# Patient Record
Sex: FEMALE | Race: WHITE | NOT HISPANIC OR LATINO | Employment: OTHER | URBAN - METROPOLITAN AREA
[De-identification: names, ages, dates, MRNs, and addresses within clinical notes are randomized per-mention and may not be internally consistent; named-entity substitution may affect disease eponyms.]

---

## 2023-02-01 PROBLEM — R19.6 HALITOSIS: Status: ACTIVE | Noted: 2023-02-01

## 2023-02-01 PROBLEM — H53.9 CHANGES IN VISION: Status: ACTIVE | Noted: 2023-02-01

## 2023-02-01 PROBLEM — J01.00 ACUTE MAXILLARY SINUSITIS: Status: ACTIVE | Noted: 2023-02-01

## 2023-02-01 PROBLEM — M75.101 RIGHT ROTATOR CUFF TEAR: Status: ACTIVE | Noted: 2023-02-01

## 2023-02-01 PROBLEM — M79.18 MYOFASCIAL PAIN: Status: ACTIVE | Noted: 2023-02-01

## 2023-02-01 PROBLEM — R29.6 FREQUENT FALLS: Status: ACTIVE | Noted: 2023-02-01

## 2023-02-01 PROBLEM — H52.13 MYOPIA, BILATERAL: Status: ACTIVE | Noted: 2023-02-01

## 2023-02-01 PROBLEM — G47.00 INSOMNIA: Status: ACTIVE | Noted: 2023-02-01

## 2023-02-01 PROBLEM — M79.2 NEUROPATHIC PAIN: Status: ACTIVE | Noted: 2023-02-01

## 2023-02-01 PROBLEM — F43.10 PTSD (POST-TRAUMATIC STRESS DISORDER): Status: ACTIVE | Noted: 2023-02-01

## 2023-02-01 PROBLEM — M62.838 TRAPEZIUS MUSCLE SPASM: Status: ACTIVE | Noted: 2023-02-01

## 2023-02-01 PROBLEM — J34.89 NASAL SORE: Status: ACTIVE | Noted: 2023-02-01

## 2023-02-01 PROBLEM — M54.2 NECK PAIN: Status: ACTIVE | Noted: 2023-02-01

## 2023-02-01 PROBLEM — E78.1 HYPERTRIGLYCERIDEMIA: Status: ACTIVE | Noted: 2023-02-01

## 2023-02-01 PROBLEM — J30.9 ALLERGIC RHINITIS: Status: ACTIVE | Noted: 2023-02-01

## 2023-02-01 PROBLEM — K59.00 CONSTIPATION: Status: ACTIVE | Noted: 2023-02-01

## 2023-02-01 PROBLEM — E78.5 HYPERLIPEMIA: Status: ACTIVE | Noted: 2023-02-01

## 2023-02-01 PROBLEM — R41.3 MEMORY LOSS: Status: ACTIVE | Noted: 2023-02-01

## 2023-02-01 PROBLEM — N63.20 LEFT BREAST MASS: Status: ACTIVE | Noted: 2023-02-01

## 2023-02-01 PROBLEM — S76.819A STRAIN OF ILIOPSOAS MUSCLE: Status: ACTIVE | Noted: 2023-02-01

## 2023-02-01 PROBLEM — N39.41 URGE INCONTINENCE OF URINE: Status: ACTIVE | Noted: 2023-02-01

## 2023-02-01 PROBLEM — Z96.611 STATUS POST REPLACEMENT OF RIGHT SHOULDER JOINT: Status: ACTIVE | Noted: 2023-02-01

## 2023-02-01 PROBLEM — R10.32 LEFT INGUINAL PAIN: Status: ACTIVE | Noted: 2023-02-01

## 2023-02-01 PROBLEM — M75.21 BICEPS TENDINITIS OF RIGHT UPPER EXTREMITY: Status: ACTIVE | Noted: 2023-02-01

## 2023-02-01 PROBLEM — M17.0 OSTEOARTHRITIS OF BOTH KNEES: Status: ACTIVE | Noted: 2023-02-01

## 2023-02-01 PROBLEM — R05.3 CHRONIC COUGH: Status: ACTIVE | Noted: 2023-02-01

## 2023-02-01 PROBLEM — M54.50 LOW BACK PAIN: Status: ACTIVE | Noted: 2023-02-01

## 2023-02-01 PROBLEM — R05.3 PERSISTENT COUGH: Status: ACTIVE | Noted: 2023-02-01

## 2023-02-01 PROBLEM — M25.552 HIP PAIN, LEFT: Status: ACTIVE | Noted: 2023-02-01

## 2023-02-01 PROBLEM — N39.3 STRESS INCONTINENCE, FEMALE: Status: ACTIVE | Noted: 2023-02-01

## 2023-02-01 PROBLEM — K21.9 GERD (GASTROESOPHAGEAL REFLUX DISEASE): Status: ACTIVE | Noted: 2023-02-01

## 2023-02-01 PROBLEM — H52.4 BILATERAL PRESBYOPIA: Status: ACTIVE | Noted: 2023-02-01

## 2023-02-01 PROBLEM — Z96.619 S/P SHOULDER REPLACEMENT: Status: ACTIVE | Noted: 2023-02-01

## 2023-02-01 PROBLEM — F98.8 ATTENTION DEFICIT DISORDER (ADD): Status: ACTIVE | Noted: 2023-02-01

## 2023-02-01 PROBLEM — M19.049 ARTHRITIS OF CARPOMETACARPAL JOINT: Status: ACTIVE | Noted: 2023-02-01

## 2023-02-01 PROBLEM — M25.511 RIGHT SHOULDER PAIN: Status: ACTIVE | Noted: 2023-02-01

## 2023-02-01 PROBLEM — S06.9XAA TBI (TRAUMATIC BRAIN INJURY) (MULTI): Status: ACTIVE | Noted: 2023-02-01

## 2023-02-01 PROBLEM — R92.1 CALCIFICATION OF BREAST: Status: ACTIVE | Noted: 2023-02-01

## 2023-02-01 PROBLEM — M79.645 PAIN OF LEFT THUMB: Status: ACTIVE | Noted: 2023-02-01

## 2023-02-01 PROBLEM — T84.50XA PROSTHETIC JOINT INFECTION (CMS-HCC): Status: ACTIVE | Noted: 2023-02-01

## 2023-02-01 PROBLEM — R00.2 PALPITATIONS: Status: ACTIVE | Noted: 2023-02-01

## 2023-02-01 PROBLEM — R63.4 WEIGHT LOSS: Status: ACTIVE | Noted: 2023-02-01

## 2023-02-01 PROBLEM — F32.A ANXIETY AND DEPRESSION: Status: ACTIVE | Noted: 2023-02-01

## 2023-02-01 PROBLEM — I10 HYPERTENSION: Status: ACTIVE | Noted: 2023-02-01

## 2023-02-01 PROBLEM — R73.01 ELEVATED FASTING GLUCOSE: Status: ACTIVE | Noted: 2023-02-01

## 2023-02-01 PROBLEM — M16.10 HIP ARTHRITIS: Status: ACTIVE | Noted: 2023-02-01

## 2023-02-01 PROBLEM — J40: Status: ACTIVE | Noted: 2023-02-01

## 2023-02-01 PROBLEM — Z98.890 S/P ARTHROSCOPY OF RIGHT SHOULDER: Status: ACTIVE | Noted: 2023-02-01

## 2023-02-01 PROBLEM — M53.3 SACROILIAC JOINT DYSFUNCTION OF BOTH SIDES: Status: ACTIVE | Noted: 2023-02-01

## 2023-02-01 PROBLEM — M53.3 SACROILIAC JOINT PAIN: Status: ACTIVE | Noted: 2023-02-01

## 2023-02-01 PROBLEM — F41.9 ANXIETY AND DEPRESSION: Status: ACTIVE | Noted: 2023-02-01

## 2023-02-01 PROBLEM — M18.12 PRIMARY OSTEOARTHRITIS OF FIRST CARPOMETACARPAL JOINT OF LEFT HAND: Status: ACTIVE | Noted: 2023-02-01

## 2023-02-01 PROBLEM — H25.13 CATARACT, NUCLEAR SCLEROTIC, BOTH EYES: Status: ACTIVE | Noted: 2023-02-01

## 2023-02-01 PROBLEM — S91.119A LACERATION OF TOE, RIGHT: Status: ACTIVE | Noted: 2023-02-01

## 2023-02-01 PROBLEM — J20.9 ACUTE BRONCHITIS WITH BRONCHOSPASM: Status: ACTIVE | Noted: 2023-02-01

## 2023-02-01 PROBLEM — R10.30 GROIN PAIN: Status: ACTIVE | Noted: 2023-02-01

## 2023-02-01 PROBLEM — M54.31 BACK PAIN WITH RIGHT-SIDED SCIATICA: Status: ACTIVE | Noted: 2023-02-01

## 2023-02-01 PROBLEM — R26.2 DIFFICULTY WALKING: Status: ACTIVE | Noted: 2023-02-01

## 2023-02-01 PROBLEM — L01.00 IMPETIGO: Status: ACTIVE | Noted: 2023-02-01

## 2023-02-01 RX ORDER — IBUPROFEN 800 MG/1
800 TABLET ORAL EVERY 8 HOURS PRN
COMMUNITY
End: 2024-03-27 | Stop reason: WASHOUT

## 2023-02-01 RX ORDER — BUPROPION HYDROCHLORIDE 450 MG/1
1 TABLET, FILM COATED, EXTENDED RELEASE ORAL DAILY
COMMUNITY
Start: 2021-01-21 | End: 2024-02-22 | Stop reason: SDUPTHER

## 2023-02-01 RX ORDER — AMITRIPTYLINE HYDROCHLORIDE 10 MG/1
10 TABLET, FILM COATED ORAL
COMMUNITY
Start: 2022-05-04 | End: 2024-02-22 | Stop reason: ALTCHOICE

## 2023-02-01 RX ORDER — TOPIRAMATE 100 MG/1
100 TABLET, FILM COATED ORAL NIGHTLY
COMMUNITY
Start: 2020-06-24 | End: 2024-02-22 | Stop reason: SDUPTHER

## 2023-02-01 RX ORDER — FOLIC ACID 1 MG/1
1 TABLET ORAL 2 TIMES DAILY
COMMUNITY
Start: 2021-01-21

## 2023-02-01 RX ORDER — NAPROXEN SODIUM 220 MG/1
1 TABLET ORAL DAILY
COMMUNITY
Start: 2021-01-21 | End: 2024-02-22 | Stop reason: SDUPTHER

## 2023-02-01 RX ORDER — MULTIVITAMIN
1 TABLET ORAL DAILY
COMMUNITY
Start: 2022-05-04 | End: 2024-02-22 | Stop reason: SDUPTHER

## 2023-02-01 RX ORDER — MEMANTINE HYDROCHLORIDE 10 MG/1
10 TABLET ORAL 2 TIMES DAILY
COMMUNITY
Start: 2021-01-21 | End: 2024-02-22 | Stop reason: SDUPTHER

## 2023-02-01 RX ORDER — LIDOCAINE 50 MG/G
OINTMENT TOPICAL 3 TIMES DAILY
COMMUNITY
Start: 2021-01-22 | End: 2024-02-22 | Stop reason: SDUPTHER

## 2023-02-01 RX ORDER — ATORVASTATIN CALCIUM 40 MG/1
1 TABLET, FILM COATED ORAL DAILY
COMMUNITY
Start: 2020-06-24 | End: 2024-02-22 | Stop reason: ALTCHOICE

## 2023-02-01 RX ORDER — METHYLPREDNISOLONE SODIUM SUCCINATE 40 MG/ML
INJECTION INTRAMUSCULAR; INTRAVENOUS
COMMUNITY
Start: 2021-09-08 | End: 2024-02-22 | Stop reason: ALTCHOICE

## 2023-02-01 RX ORDER — DULOXETIN HYDROCHLORIDE 60 MG/1
60 CAPSULE, DELAYED RELEASE ORAL DAILY
COMMUNITY
End: 2024-02-22 | Stop reason: SDUPTHER

## 2023-02-01 RX ORDER — METOPROLOL TARTRATE 25 MG/1
0.5 TABLET, FILM COATED ORAL 2 TIMES DAILY
COMMUNITY
Start: 2022-08-08 | End: 2024-02-22 | Stop reason: SDUPTHER

## 2023-02-01 RX ORDER — GABAPENTIN 100 MG/1
100 CAPSULE ORAL
COMMUNITY
End: 2024-02-22 | Stop reason: SDUPTHER

## 2023-03-27 ENCOUNTER — TELEPHONE (OUTPATIENT)
Dept: PRIMARY CARE | Facility: CLINIC | Age: 68
End: 2023-03-27

## 2023-09-08 ENCOUNTER — APPOINTMENT (OUTPATIENT)
Dept: PRIMARY CARE | Facility: CLINIC | Age: 68
End: 2023-09-08

## 2024-01-29 PROBLEM — S49.91XA INJURY OF RIGHT SHOULDER: Status: ACTIVE | Noted: 2022-06-27

## 2024-01-29 PROBLEM — Z96.611 S/P REVERSE TOTAL SHOULDER ARTHROPLASTY, RIGHT: Status: ACTIVE | Noted: 2022-09-06

## 2024-01-29 PROBLEM — S46.811A FULL THICKNESS TEAR OF RIGHT SUBSCAPULARIS TENDON: Status: ACTIVE | Noted: 2022-04-18

## 2024-01-29 PROBLEM — R92.8 ABNORMAL MAMMOGRAM: Status: ACTIVE | Noted: 2017-10-01

## 2024-01-29 PROBLEM — M25.611 DECREASED RANGE OF MOTION OF RIGHT SHOULDER: Status: ACTIVE | Noted: 2022-12-10

## 2024-01-29 PROBLEM — Z77.120 EXPOSURE TO MOLD: Status: ACTIVE | Noted: 2024-01-29
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
## 2024-01-29 PROBLEM — S16.1XXA CERVICAL MUSCLE STRAIN: Status: ACTIVE | Noted: 2024-01-29

## 2024-01-29 PROBLEM — S82.832D CLOSED FRACTURE OF DISTAL END OF LEFT FIBULA WITH ROUTINE HEALING: Status: ACTIVE | Noted: 2024-01-29

## 2024-01-29 PROBLEM — S61.419A LACERATION OF HAND: Status: ACTIVE | Noted: 2024-01-29

## 2024-01-29 PROBLEM — E66.9 OBESE: Status: ACTIVE | Noted: 2019-08-15

## 2024-01-29 PROBLEM — M19.90 OSTEOARTHRITIS: Status: ACTIVE | Noted: 2022-06-14

## 2024-01-29 PROBLEM — S43.409A SPRAIN OF SHOULDER: Status: ACTIVE | Noted: 2024-01-29

## 2024-01-29 PROBLEM — R06.00 DYSPNEA: Status: ACTIVE | Noted: 2024-01-29

## 2024-02-22 ENCOUNTER — OFFICE VISIT (OUTPATIENT)
Dept: PRIMARY CARE | Facility: CLINIC | Age: 69
End: 2024-02-22
Payer: MEDICAID

## 2024-02-22 VITALS
HEART RATE: 87 BPM | DIASTOLIC BLOOD PRESSURE: 62 MMHG | TEMPERATURE: 97.3 F | RESPIRATION RATE: 18 BRPM | HEIGHT: 63 IN | BODY MASS INDEX: 30.33 KG/M2 | SYSTOLIC BLOOD PRESSURE: 122 MMHG | OXYGEN SATURATION: 95 % | WEIGHT: 171.2 LBS

## 2024-02-22 DIAGNOSIS — S06.9XAS TRAUMATIC BRAIN INJURY, WITH UNKNOWN LOSS OF CONSCIOUSNESS STATUS, SEQUELA (CMS-HCC): Primary | ICD-10-CM

## 2024-02-22 DIAGNOSIS — E56.9 VITAMIN DEFICIENCY: ICD-10-CM

## 2024-02-22 DIAGNOSIS — F43.10 PTSD (POST-TRAUMATIC STRESS DISORDER): ICD-10-CM

## 2024-02-22 DIAGNOSIS — F98.8 ATTENTION DEFICIT DISORDER, UNSPECIFIED HYPERACTIVITY PRESENCE: ICD-10-CM

## 2024-02-22 DIAGNOSIS — G89.29 CHRONIC LEFT SHOULDER PAIN: ICD-10-CM

## 2024-02-22 DIAGNOSIS — M54.31 BACK PAIN WITH RIGHT-SIDED SCIATICA: ICD-10-CM

## 2024-02-22 DIAGNOSIS — M75.21 BICEPS TENDINITIS OF RIGHT UPPER EXTREMITY: ICD-10-CM

## 2024-02-22 DIAGNOSIS — G89.29 CHRONIC NECK PAIN: ICD-10-CM

## 2024-02-22 DIAGNOSIS — R41.3 MEMORY LOSS: ICD-10-CM

## 2024-02-22 DIAGNOSIS — R10.32 GROIN PAIN, LEFT: ICD-10-CM

## 2024-02-22 DIAGNOSIS — I10 PRIMARY HYPERTENSION: ICD-10-CM

## 2024-02-22 DIAGNOSIS — M54.2 CHRONIC NECK PAIN: ICD-10-CM

## 2024-02-22 DIAGNOSIS — M25.512 CHRONIC LEFT SHOULDER PAIN: ICD-10-CM

## 2024-02-22 DIAGNOSIS — F41.9 ANXIETY AND DEPRESSION: ICD-10-CM

## 2024-02-22 DIAGNOSIS — Z12.11 SCREENING FOR COLON CANCER: ICD-10-CM

## 2024-02-22 DIAGNOSIS — F32.A ANXIETY AND DEPRESSION: ICD-10-CM

## 2024-02-22 DIAGNOSIS — K59.01 SLOW TRANSIT CONSTIPATION: ICD-10-CM

## 2024-02-22 PROCEDURE — 3078F DIAST BP <80 MM HG: CPT | Performed by: NURSE PRACTITIONER

## 2024-02-22 PROCEDURE — 3074F SYST BP LT 130 MM HG: CPT | Performed by: NURSE PRACTITIONER

## 2024-02-22 PROCEDURE — 1036F TOBACCO NON-USER: CPT | Performed by: NURSE PRACTITIONER

## 2024-02-22 PROCEDURE — 1159F MED LIST DOCD IN RCRD: CPT | Performed by: NURSE PRACTITIONER

## 2024-02-22 PROCEDURE — 1126F AMNT PAIN NOTED NONE PRSNT: CPT | Performed by: NURSE PRACTITIONER

## 2024-02-22 PROCEDURE — 99215 OFFICE O/P EST HI 40 MIN: CPT | Performed by: NURSE PRACTITIONER

## 2024-02-22 RX ORDER — METOPROLOL TARTRATE 25 MG/1
12.5 TABLET, FILM COATED ORAL 2 TIMES DAILY
Qty: 90 TABLET | Refills: 1 | Status: SHIPPED | OUTPATIENT
Start: 2024-02-22 | End: 2024-06-07 | Stop reason: SDUPTHER

## 2024-02-22 RX ORDER — TOPIRAMATE 100 MG/1
100 TABLET, FILM COATED ORAL NIGHTLY
Qty: 90 TABLET | Refills: 1 | Status: SHIPPED | OUTPATIENT
Start: 2024-02-22

## 2024-02-22 RX ORDER — BUPROPION HYDROCHLORIDE 450 MG/1
450 TABLET, FILM COATED, EXTENDED RELEASE ORAL DAILY
Qty: 90 TABLET | Refills: 1 | Status: SHIPPED | OUTPATIENT
Start: 2024-02-22 | End: 2024-04-25 | Stop reason: SDUPTHER

## 2024-02-22 RX ORDER — MULTIVITAMIN
1 TABLET ORAL DAILY
Qty: 90 TABLET | Refills: 1 | Status: SHIPPED | OUTPATIENT
Start: 2024-02-22 | End: 2024-06-07 | Stop reason: SDUPTHER

## 2024-02-22 RX ORDER — GABAPENTIN 100 MG/1
CAPSULE ORAL
Qty: 120 CAPSULE | Refills: 1 | Status: SHIPPED | OUTPATIENT
Start: 2024-02-22

## 2024-02-22 RX ORDER — LIDOCAINE 50 MG/G
OINTMENT TOPICAL 3 TIMES DAILY
Qty: 240 G | Refills: 1 | Status: SHIPPED | OUTPATIENT
Start: 2024-02-22 | End: 2024-04-25 | Stop reason: SDUPTHER

## 2024-02-22 RX ORDER — DULOXETIN HYDROCHLORIDE 60 MG/1
60 CAPSULE, DELAYED RELEASE ORAL DAILY
Qty: 90 CAPSULE | Refills: 0 | Status: SHIPPED | OUTPATIENT
Start: 2024-02-22 | End: 2024-06-07 | Stop reason: SDUPTHER

## 2024-02-22 RX ORDER — MEMANTINE HYDROCHLORIDE 10 MG/1
10 TABLET ORAL 2 TIMES DAILY
Qty: 90 TABLET | Refills: 1 | Status: SHIPPED | OUTPATIENT
Start: 2024-02-22 | End: 2024-06-07 | Stop reason: SDUPTHER

## 2024-02-22 ASSESSMENT — PATIENT HEALTH QUESTIONNAIRE - PHQ9
1. LITTLE INTEREST OR PLEASURE IN DOING THINGS: NOT AT ALL
2. FEELING DOWN, DEPRESSED OR HOPELESS: NOT AT ALL
SUM OF ALL RESPONSES TO PHQ9 QUESTIONS 1 & 2: 0

## 2024-02-22 ASSESSMENT — LIFESTYLE VARIABLES
HOW MANY STANDARD DRINKS CONTAINING ALCOHOL DO YOU HAVE ON A TYPICAL DAY: 1 OR 2
AUDIT-C TOTAL SCORE: 1
HOW OFTEN DO YOU HAVE SIX OR MORE DRINKS ON ONE OCCASION: NEVER
SKIP TO QUESTIONS 9-10: 1
HOW OFTEN DO YOU HAVE A DRINK CONTAINING ALCOHOL: MONTHLY OR LESS
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
## 2024-02-22 NOTE — PATIENT INSTRUCTIONS
Install the [facility] my chart to your phone so you can see what was done   Install the [facility] my chart   Get a    Have your room mate get a sleep study for sleep apnea   You look really good right   There is jeovanny that is a network of information and support for people with add and adhd   Follow up with prakash porras and also a psychiatric social worker to talk with   Follow up 1 month   Go to the library for their resources   It takes time to get back on your feet   In [place] people get ketamine infusions for pain  there is a comprehensive pain center in [place]

## 2024-03-08 DIAGNOSIS — M54.31 BACK PAIN WITH RIGHT-SIDED SCIATICA: ICD-10-CM

## 2024-03-08 RX ORDER — LIDOCAINE, MENTHOL 4; 1 G/100G; G/100G
1 PATCH TOPICAL AS NEEDED
Qty: 30 PATCH | Refills: 3 | Status: SHIPPED | OUTPATIENT
Start: 2024-03-08

## 2024-03-08 RX ORDER — LIDOCAINE, MENTHOL 4; 1 G/100G; G/100G
PATCH TOPICAL AS NEEDED
COMMUNITY
End: 2024-03-08 | Stop reason: SDUPTHER

## 2024-03-08 NOTE — TELEPHONE ENCOUNTER
Patient contacted the office to request a medication change. She would like to take the topiramate 100 mg twice a day; one in the morning and one in the evening. Patient needs a prior authorization on said medication.

## 2024-03-27 ENCOUNTER — OFFICE VISIT (OUTPATIENT)
Dept: PRIMARY CARE | Facility: CLINIC | Age: 69
End: 2024-03-27
Payer: COMMERCIAL

## 2024-03-27 VITALS
DIASTOLIC BLOOD PRESSURE: 80 MMHG | WEIGHT: 176 LBS | BODY MASS INDEX: 31.18 KG/M2 | TEMPERATURE: 96.9 F | HEART RATE: 70 BPM | RESPIRATION RATE: 12 BRPM | SYSTOLIC BLOOD PRESSURE: 124 MMHG | OXYGEN SATURATION: 95 % | HEIGHT: 63 IN

## 2024-03-27 DIAGNOSIS — G89.29 CHRONIC BILATERAL LOW BACK PAIN WITH BILATERAL SCIATICA: ICD-10-CM

## 2024-03-27 DIAGNOSIS — H53.9 VISUAL CHANGES: ICD-10-CM

## 2024-03-27 DIAGNOSIS — E78.2 MIXED HYPERLIPIDEMIA: ICD-10-CM

## 2024-03-27 DIAGNOSIS — M54.42 CHRONIC BILATERAL LOW BACK PAIN WITH BILATERAL SCIATICA: ICD-10-CM

## 2024-03-27 DIAGNOSIS — K59.01 SLOW TRANSIT CONSTIPATION: ICD-10-CM

## 2024-03-27 DIAGNOSIS — Z87.828 HX OF HEAD INJURY: ICD-10-CM

## 2024-03-27 DIAGNOSIS — R41.3 MEMORY LOSS: ICD-10-CM

## 2024-03-27 DIAGNOSIS — F43.10 PTSD (POST-TRAUMATIC STRESS DISORDER): Primary | ICD-10-CM

## 2024-03-27 DIAGNOSIS — F33.1 MODERATE EPISODE OF RECURRENT MAJOR DEPRESSIVE DISORDER (MULTI): ICD-10-CM

## 2024-03-27 DIAGNOSIS — M54.41 CHRONIC BILATERAL LOW BACK PAIN WITH BILATERAL SCIATICA: ICD-10-CM

## 2024-03-27 PROCEDURE — 99214 OFFICE O/P EST MOD 30 MIN: CPT | Performed by: NURSE PRACTITIONER

## 2024-03-27 PROCEDURE — 3079F DIAST BP 80-89 MM HG: CPT | Performed by: NURSE PRACTITIONER

## 2024-03-27 PROCEDURE — 1159F MED LIST DOCD IN RCRD: CPT | Performed by: NURSE PRACTITIONER

## 2024-03-27 PROCEDURE — 1160F RVW MEDS BY RX/DR IN RCRD: CPT | Performed by: NURSE PRACTITIONER

## 2024-03-27 PROCEDURE — 3074F SYST BP LT 130 MM HG: CPT | Performed by: NURSE PRACTITIONER

## 2024-03-27 RX ORDER — DOCUSATE SODIUM 100 MG/1
CAPSULE, LIQUID FILLED ORAL
Qty: 90 CAPSULE | Refills: 11 | Status: SHIPPED | OUTPATIENT
Start: 2024-03-27

## 2024-03-27 RX ORDER — ATORVASTATIN CALCIUM 40 MG/1
40 TABLET, FILM COATED ORAL DAILY
Qty: 100 TABLET | Refills: 3 | Status: SHIPPED | OUTPATIENT
Start: 2024-03-27 | End: 2025-05-01

## 2024-03-27 ASSESSMENT — PATIENT HEALTH QUESTIONNAIRE - PHQ9
2. FEELING DOWN, DEPRESSED OR HOPELESS: NOT AT ALL
SUM OF ALL RESPONSES TO PHQ9 QUESTIONS 1 AND 2: 0
1. LITTLE INTEREST OR PLEASURE IN DOING THINGS: NOT AT ALL
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
## 2024-03-27 NOTE — PATIENT INSTRUCTIONS
Get a  with your insurance   Call for an appointment  her name is marlyn bowden   Download the  mychart on theplaystore   Try to have some beans like green lentils and eat bran cereal in the am and use stool softeners   Follow up in a month

## 2024-04-02 ENCOUNTER — APPOINTMENT (OUTPATIENT)
Dept: NEUROSURGERY | Facility: CLINIC | Age: 69
End: 2024-04-02
Payer: COMMERCIAL

## 2024-04-08 PROBLEM — F03.93 DEMENTIA WITH MOOD DISTURBANCE, UNSPECIFIED DEMENTIA SEVERITY, UNSPECIFIED DEMENTIA TYPE (MULTI): Status: ACTIVE | Noted: 2024-04-08

## 2024-04-23 ENCOUNTER — TELEMEDICINE (OUTPATIENT)
Dept: PHARMACY | Facility: HOSPITAL | Age: 69
End: 2024-04-23

## 2024-04-23 DIAGNOSIS — F43.10 PTSD (POST-TRAUMATIC STRESS DISORDER): ICD-10-CM

## 2024-04-23 NOTE — PROGRESS NOTES
"Subjective   Patient ID: Johanne Acosta is a 68 y.o. female who presents for Med Management.    Referring Provider: Aislinn Ambrosio APRN*    Patient has had some difficulty getting Topamax, Lidocain 5% Patches, and is in need of a PA for Forfivo 450 mg 24 hr tablets. States she has tried generic bupropion and it \"did nothing\" and that she was told by a previous  provider that this is common.     Objective     There were no vitals taken for this visit.     LAB  Lab Results   Component Value Date    BILITOT 0.4 04/20/2021    CALCIUM 9.7 04/20/2021    CO2 26 04/20/2021     04/20/2021    CREATININE 0.84 04/20/2021    GLUCOSE 103 (H) 04/20/2021    ALKPHOS 102 04/20/2021    K 3.8 04/20/2021    PROT 6.6 04/20/2021     04/20/2021    AST 15 04/20/2021    ALT 14 04/20/2021    BUN 15 04/20/2021    ANIONGAP 16 04/20/2021    PHOS 2.6 01/13/2021    ALBUMIN 4.3 04/20/2021     Lab Results   Component Value Date    TRIG 238 (H) 09/22/2020    CHOL 162 09/22/2020    HDL 60.0 09/22/2020     No results found for: \"HGBA1C\"  The ASCVD Risk score (Stephanie DK, et al., 2019) failed to calculate for the following reasons:    Cannot find a previous HDL lab    Cannot find a previous total cholesterol lab    Assessment/Plan   Problem List Items Addressed This Visit       PTSD (post-traumatic stress disorder)     Patient informed that Lidocaine 5% patches are on national backorder and that Lidocaine 5% ointment was sent to her preferred pharmacy.     Confirmed patient has previously been on topiramate and denies any impact on cognitive functioning. Called [facility] in [place]- Rx was transferred to [facility]- topiramate needs a PA as well    PA for Forfivo XL APPROVED: Key: [id number]    PA for Topiramate SUBMITTED: Key: [id number]     Follow-up: not necessary at this time    Martha LancasetrD [facility]  Clinical Pharmacist Specialist, Primary Care    Continue all meds under the continuation of care with the " referring provider and clinical pharmacy team

## 2024-04-25 ENCOUNTER — OFFICE VISIT (OUTPATIENT)
Dept: PRIMARY CARE | Facility: CLINIC | Age: 69
End: 2024-04-25
Payer: COMMERCIAL

## 2024-04-25 VITALS
HEART RATE: 98 BPM | OXYGEN SATURATION: 95 % | BODY MASS INDEX: 29.77 KG/M2 | TEMPERATURE: 98.4 F | RESPIRATION RATE: 12 BRPM | SYSTOLIC BLOOD PRESSURE: 132 MMHG | HEIGHT: 65 IN | WEIGHT: 178.7 LBS | DIASTOLIC BLOOD PRESSURE: 80 MMHG

## 2024-04-25 DIAGNOSIS — M25.521 RIGHT ELBOW PAIN: ICD-10-CM

## 2024-04-25 DIAGNOSIS — G89.29 CHRONIC PAIN OF RIGHT KNEE: ICD-10-CM

## 2024-04-25 DIAGNOSIS — F98.8 ATTENTION DEFICIT DISORDER (ADD) WITHOUT HYPERACTIVITY: ICD-10-CM

## 2024-04-25 DIAGNOSIS — G89.29 CHRONIC BILATERAL LOW BACK PAIN WITH BILATERAL SCIATICA: ICD-10-CM

## 2024-04-25 DIAGNOSIS — F32.A ANXIETY AND DEPRESSION: ICD-10-CM

## 2024-04-25 DIAGNOSIS — E56.9 VITAMIN DEFICIENCY: ICD-10-CM

## 2024-04-25 DIAGNOSIS — R09.81 SINUS CONGESTION: ICD-10-CM

## 2024-04-25 DIAGNOSIS — M54.42 CHRONIC BILATERAL LOW BACK PAIN WITH BILATERAL SCIATICA: ICD-10-CM

## 2024-04-25 DIAGNOSIS — M25.561 CHRONIC PAIN OF RIGHT KNEE: ICD-10-CM

## 2024-04-25 DIAGNOSIS — Z59.819 HOUSING INSECURITY: ICD-10-CM

## 2024-04-25 DIAGNOSIS — M48.02 SPINAL STENOSIS OF CERVICAL REGION: ICD-10-CM

## 2024-04-25 DIAGNOSIS — Z59.41 FOOD INSECURITY: ICD-10-CM

## 2024-04-25 DIAGNOSIS — M54.31 BACK PAIN WITH RIGHT-SIDED SCIATICA: ICD-10-CM

## 2024-04-25 DIAGNOSIS — I10 PRIMARY HYPERTENSION: Primary | ICD-10-CM

## 2024-04-25 DIAGNOSIS — Z59.86 FINANCIAL INSECURITY: ICD-10-CM

## 2024-04-25 DIAGNOSIS — F41.9 ANXIETY AND DEPRESSION: ICD-10-CM

## 2024-04-25 DIAGNOSIS — M54.41 CHRONIC BILATERAL LOW BACK PAIN WITH BILATERAL SCIATICA: ICD-10-CM

## 2024-04-25 PROCEDURE — 1160F RVW MEDS BY RX/DR IN RCRD: CPT | Performed by: NURSE PRACTITIONER

## 2024-04-25 PROCEDURE — 1159F MED LIST DOCD IN RCRD: CPT | Performed by: NURSE PRACTITIONER

## 2024-04-25 PROCEDURE — 3079F DIAST BP 80-89 MM HG: CPT | Performed by: NURSE PRACTITIONER

## 2024-04-25 PROCEDURE — 99215 OFFICE O/P EST HI 40 MIN: CPT | Performed by: NURSE PRACTITIONER

## 2024-04-25 PROCEDURE — 3075F SYST BP GE 130 - 139MM HG: CPT | Performed by: NURSE PRACTITIONER

## 2024-04-25 RX ORDER — LORATADINE 10 MG/1
TABLET ORAL
Qty: 30 TABLET | Refills: 2 | Status: SHIPPED | OUTPATIENT
Start: 2024-04-25

## 2024-04-25 RX ORDER — LIDOCAINE 50 MG/G
OINTMENT TOPICAL 3 TIMES DAILY
Qty: 240 G | Refills: 1 | Status: SHIPPED | OUTPATIENT
Start: 2024-04-25

## 2024-04-25 RX ORDER — FLUTICASONE PROPIONATE 50 MCG
1 SPRAY, SUSPENSION (ML) NASAL DAILY
Qty: 16 G | Refills: 5 | Status: SHIPPED | OUTPATIENT
Start: 2024-04-25 | End: 2025-04-25

## 2024-04-25 RX ORDER — FOLIC ACID 1 MG/1
1 TABLET ORAL 2 TIMES DAILY
Status: CANCELLED | OUTPATIENT
Start: 2024-04-25

## 2024-04-25 RX ORDER — BUPROPION HYDROCHLORIDE 450 MG/1
TABLET, FILM COATED, EXTENDED RELEASE ORAL
Qty: 90 TABLET | Refills: 1 | Status: SHIPPED | OUTPATIENT
Start: 2024-04-25 | End: 2024-06-07 | Stop reason: SDUPTHER

## 2024-04-25 RX ORDER — FOLIC ACID 1 MG/1
1 TABLET ORAL DAILY
Qty: 90 TABLET | Refills: 1 | Status: SHIPPED | OUTPATIENT
Start: 2024-04-25 | End: 2025-04-25

## 2024-04-25 RX ORDER — LIDOCAINE 50 MG/G
PATCH TOPICAL
Qty: 30 PATCH | Refills: 3 | Status: SHIPPED | OUTPATIENT
Start: 2024-04-25

## 2024-04-25 RX ORDER — GUAIFENESIN 600 MG/1
1200 TABLET, EXTENDED RELEASE ORAL 2 TIMES DAILY
Qty: 120 TABLET | Refills: 11 | Status: SHIPPED | OUTPATIENT
Start: 2024-04-25 | End: 2025-04-25

## 2024-04-25 SDOH — ECONOMIC STABILITY - FOOD INSECURITY: FOOD INSECURITY: Z59.41

## 2024-04-25 SDOH — ECONOMIC STABILITY - HOUSING INSECURITY: HOUSING INSTABILITY UNSPECIFIED: Z59.819

## 2024-04-25 SDOH — ECONOMIC STABILITY - INCOME SECURITY: FINANCIAL INSECURITY: Z59.86

## 2024-04-25 ASSESSMENT — PATIENT HEALTH QUESTIONNAIRE - PHQ9
1. LITTLE INTEREST OR PLEASURE IN DOING THINGS: NOT AT ALL
SUM OF ALL RESPONSES TO PHQ9 QUESTIONS 1 AND 2: 0
2. FEELING DOWN, DEPRESSED OR HOPELESS: NOT AT ALL

## 2024-04-25 NOTE — PROGRESS NOTES
"Subjective   Patient ID: Johanne Acosta is a 68 y.o. female who presents for Follow-up (Follow up- meds discussion).    HPI the patient with anxiety and depression add , back pain , cataracs , hip arthritis , htn , hyperlipidemia , s/p shoulder replacement , stress incontinence, traumatic brain injury  , hypothyroidism, cognitive decline joins me for a follow up visit   She is living in a shelter in [place] , she is not able to have enough food and has problems paying bills . Her car that was given to her needs repairs   I discussed having her talk to our community  to help her get some resources   I advised her to get a  through her insurance   She has applied for section 8 last year , she left the area and went to [place] and has come back   She is having sinus congestion she has pressure over the left maxillary region   She does not want to go to the ortho office she could not have a parking space   She has tingling of the hands from her injury in the past she has chronic back pain knee pain and elbow pain\ and spinal stenosis of the cervical spine   I advised her to go to the comprehensive pain center   She is likely deconditioned and has djd in her joints   She needs pa for the medications that help her , lidocaine patches , Forfivo 450 and topamax   I spent 61 minutes with the patient face to face and 10 minutes with documentation     Review of Systems. Negative except what was mentioned in the HPI       Objective   /80 (Patient Position: Sitting)   Pulse 98   Temp 36.9 °C (98.4 °F)   Resp 12   Ht 1.651 m (5' 5\")   Wt 81.1 kg (178 lb 11.2 oz)   SpO2 95%   BMI 29.74 kg/m²     Physical Exam  Vitals and nursing note reviewed.   Constitutional:       Appearance: Normal appearance.   HENT:      Head: Normocephalic and atraumatic.      Right Ear: Tympanic membrane normal.      Left Ear: Tympanic membrane normal.      Nose: Nose normal.      Mouth/Throat:      Mouth: Mucous " membranes are moist.   Eyes:      Extraocular Movements: Extraocular movements intact.      Conjunctiva/sclera: Conjunctivae normal.   Cardiovascular:      Rate and Rhythm: Normal rate and regular rhythm.      Pulses: Normal pulses.      Heart sounds: Normal heart sounds.   Pulmonary:      Effort: Pulmonary effort is normal.      Breath sounds: Normal breath sounds.   Abdominal:      General: Abdomen is flat. Bowel sounds are normal.      Palpations: Abdomen is soft.   Musculoskeletal:         General: Normal range of motion.      Cervical back: Normal range of motion and neck supple.      Comments: Tenderness over the right elbow   Pain with spinal rom   Knee pain with rom    Skin:     General: Skin is warm and dry.   Neurological:      General: No focal deficit present.      Mental Status: She is alert and oriented to person, place, and time. Mental status is at baseline.   Psychiatric:         Mood and Affect: Mood normal.         Behavior: Behavior normal.         Thought Content: Thought content normal.         Judgment: Judgment normal.         Assessment/Plan   Problem List Items Addressed This Visit             ICD-10-CM    Anxiety and depression F41.9, F32.A    Relevant Medications    buPROPion XL (Forfivo XL) 450 mg 24 hr tablet    Other Relevant Orders    Referral to Psychiatry    Attention deficit disorder (ADD) F98.8    Relevant Orders    Referral to Psychiatry    Back pain with right-sided sciatica M54.31    Relevant Medications    lidocaine (Xylocaine) 5 % ointment    lidocaine (Lidoderm) 5 % patch    Other Relevant Orders    Referral to Physical Therapy    Hypertension - Primary I10    Low back pain M54.50    Relevant Medications    lidocaine (Lidoderm) 5 % patch    Other Relevant Orders    Disability Placard    XR lumbar spine 2-3 views    [facility] to [facility]    Referral to Physical Therapy     Other Visit Diagnoses         Codes    Housing insecurity     Z59.819    Relevant Orders     Referral to Community Health Worker    Food insecurity     Z59.41    Relevant Orders    Referral to Community Health Worker    Financial insecurity     Z59.86    Relevant Orders    Referral to Community Health Worker    Vitamin deficiency     E56.9    Relevant Medications    folic acid (Folvite) 1 mg tablet    Sinus congestion     R09.81    Relevant Medications    fluticasone (Flonase) 50 mcg/actuation nasal spray    loratadine (Claritin) 10 mg tablet    guaiFENesin (Mucinex) 600 mg 12 hr tablet    Other Relevant Orders    Referral to ENT    Spinal stenosis of cervical region     M48.02    Relevant Medications    lidocaine (Lidoderm) 5 % patch    Other Relevant Orders    [facility] to [facility]    Referral to Physical Therapy    Chronic pain of right knee     M25.561, G89.29    Relevant Medications    lidocaine (Lidoderm) 5 % patch    Other Relevant Orders    [facility]erral to [facility]    Referral to Physical Therapy    Right elbow pain     M25.521    Relevant Medications    lidocaine (Lidoderm) 5 % patch    Other Relevant Orders    [facility]erral to [facility]    Referral to Physical Therapy

## 2024-04-25 NOTE — PATIENT INSTRUCTIONS
Follow up in a month   Call [facility] to get in and have some counseling   Follow up in 6 weeks

## 2024-04-26 ENCOUNTER — TELEPHONE (OUTPATIENT)
Dept: PRIMARY CARE | Facility: CLINIC | Age: 69
End: 2024-04-26

## 2024-04-26 NOTE — PROGRESS NOTES
CHW phoned Pt to speak with pt concerning her SDOH referral for housing. CHW could not leave a message. CHW will assist pt when she return the call.  Pt called back and hung up on CHW. Will assist if she need further assistance.

## 2024-04-26 NOTE — TELEPHONE ENCOUNTER
Patient states she received a referral from Alexandra that states she has ADHD. Johanne states she has ADD and not ADHD and would like for the Psychiatry referral to be updated.

## 2024-05-03 ENCOUNTER — APPOINTMENT (OUTPATIENT)
Dept: NEUROSURGERY | Facility: CLINIC | Age: 69
End: 2024-05-03
Payer: MEDICARE

## 2024-05-22 ENCOUNTER — OFFICE VISIT (OUTPATIENT)
Dept: NEUROSURGERY | Facility: CLINIC | Age: 69
End: 2024-05-22
Payer: COMMERCIAL

## 2024-05-22 DIAGNOSIS — Z98.1 HISTORY OF LUMBAR SPINAL FUSION: ICD-10-CM

## 2024-05-22 DIAGNOSIS — R29.898 WEAKNESS OF BOTH LOWER EXTREMITIES: ICD-10-CM

## 2024-05-22 DIAGNOSIS — Z98.1 H/O CERVICAL SPINAL ARTHRODESIS: Primary | ICD-10-CM

## 2024-05-22 DIAGNOSIS — R29.898 WEAKNESS OF BOTH UPPER EXTREMITIES: ICD-10-CM

## 2024-05-22 PROCEDURE — 1159F MED LIST DOCD IN RCRD: CPT | Performed by: NURSE PRACTITIONER

## 2024-05-22 PROCEDURE — 1160F RVW MEDS BY RX/DR IN RCRD: CPT | Performed by: NURSE PRACTITIONER

## 2024-05-22 PROCEDURE — 99203 OFFICE O/P NEW LOW 30 MIN: CPT | Performed by: NURSE PRACTITIONER
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
## 2024-05-28 ENCOUNTER — APPOINTMENT (OUTPATIENT)
Dept: PAIN MEDICINE | Facility: CLINIC | Age: 69
End: 2024-05-28
Payer: COMMERCIAL

## 2024-06-06 ENCOUNTER — HOSPITAL ENCOUNTER (OUTPATIENT)
Dept: RADIOLOGY | Facility: HOSPITAL | Age: 69
Discharge: HOME | End: 2024-06-06
Payer: COMMERCIAL

## 2024-06-06 ENCOUNTER — LAB (OUTPATIENT)
Dept: LAB | Facility: LAB | Age: 69
End: 2024-06-06
Payer: COMMERCIAL

## 2024-06-06 DIAGNOSIS — Z98.1 H/O CERVICAL SPINAL ARTHRODESIS: ICD-10-CM

## 2024-06-06 DIAGNOSIS — Z98.1 HISTORY OF LUMBAR SPINAL FUSION: ICD-10-CM

## 2024-06-06 DIAGNOSIS — F43.10 PTSD (POST-TRAUMATIC STRESS DISORDER): ICD-10-CM

## 2024-06-06 DIAGNOSIS — E78.2 MIXED HYPERLIPIDEMIA: ICD-10-CM

## 2024-06-06 LAB
ALBUMIN SERPL BCP-MCNC: 4.7 G/DL (ref 3.4–5)
ALP SERPL-CCNC: 97 U/L (ref 33–136)
ALT SERPL W P-5'-P-CCNC: 22 U/L (ref 7–45)
ANION GAP SERPL CALC-SCNC: 14 MMOL/L (ref 10–20)
AST SERPL W P-5'-P-CCNC: 19 U/L (ref 9–39)
BILIRUB SERPL-MCNC: 0.5 MG/DL (ref 0–1.2)
BUN SERPL-MCNC: 20 MG/DL (ref 6–23)
CALCIUM SERPL-MCNC: 10 MG/DL (ref 8.6–10.3)
CHLORIDE SERPL-SCNC: 103 MMOL/L (ref 98–107)
CHOLEST SERPL-MCNC: 158 MG/DL (ref 133–200)
CHOLEST/HDLC SERPL: 2.1 {RATIO}
CO2 SERPL-SCNC: 27 MMOL/L (ref 21–32)
CREAT SERPL-MCNC: 0.88 MG/DL (ref 0.5–1.05)
EGFRCR SERPLBLD CKD-EPI 2021: 72 ML/MIN/1.73M*2
ERYTHROCYTE [DISTWIDTH] IN BLOOD BY AUTOMATED COUNT: 12 % (ref 11.5–14.5)
GLUCOSE SERPL-MCNC: 135 MG/DL (ref 74–99)
HCT VFR BLD AUTO: 42.5 % (ref 36–46)
HDLC SERPL-MCNC: 75 MG/DL
HGB BLD-MCNC: 13.8 G/DL (ref 12–16)
LDLC SERPL CALC-MCNC: 56 MG/DL (ref 65–130)
MCH RBC QN AUTO: 31.6 PG (ref 26–34)
MCHC RBC AUTO-ENTMCNC: 32.5 G/DL (ref 32–36)
MCV RBC AUTO: 97 FL (ref 80–100)
NRBC BLD-RTO: ABNORMAL /100{WBCS}
PLATELET # BLD AUTO: 237 X10*3/UL (ref 150–450)
POTASSIUM SERPL-SCNC: 3.6 MMOL/L (ref 3.5–5.3)
PROT SERPL-MCNC: 6.9 G/DL (ref 6.4–8.2)
RBC # BLD AUTO: 4.37 X10*6/UL (ref 4–5.2)
SODIUM SERPL-SCNC: 140 MMOL/L (ref 136–145)
TRIGL SERPL-MCNC: 135 MG/DL (ref 40–150)
WBC # BLD AUTO: 4 X10*3/UL (ref 4.4–11.3)

## 2024-06-06 PROCEDURE — 85027 COMPLETE CBC AUTOMATED: CPT

## 2024-06-06 PROCEDURE — 72052 X-RAY EXAM NECK SPINE 6/>VWS: CPT

## 2024-06-06 PROCEDURE — 80061 LIPID PANEL: CPT

## 2024-06-06 PROCEDURE — 80053 COMPREHEN METABOLIC PANEL: CPT

## 2024-06-06 PROCEDURE — 72120 X-RAY BEND ONLY L-S SPINE: CPT

## 2024-06-06 PROCEDURE — 72110 X-RAY EXAM L-2 SPINE 4/>VWS: CPT | Performed by: RADIOLOGY

## 2024-06-06 PROCEDURE — 72052 X-RAY EXAM NECK SPINE 6/>VWS: CPT | Performed by: RADIOLOGY

## 2024-06-06 PROCEDURE — 36415 COLL VENOUS BLD VENIPUNCTURE: CPT

## 2024-06-07 ENCOUNTER — OFFICE VISIT (OUTPATIENT)
Dept: PRIMARY CARE | Facility: CLINIC | Age: 69
End: 2024-06-07
Payer: MEDICARE

## 2024-06-07 VITALS
RESPIRATION RATE: 16 BRPM | OXYGEN SATURATION: 99 % | HEIGHT: 65 IN | HEART RATE: 77 BPM | DIASTOLIC BLOOD PRESSURE: 76 MMHG | TEMPERATURE: 97 F | BODY MASS INDEX: 31.14 KG/M2 | SYSTOLIC BLOOD PRESSURE: 140 MMHG | WEIGHT: 186.9 LBS

## 2024-06-07 DIAGNOSIS — R41.3 MEMORY LOSS: ICD-10-CM

## 2024-06-07 DIAGNOSIS — M21.611 BUNION OF GREAT TOE OF RIGHT FOOT: ICD-10-CM

## 2024-06-07 DIAGNOSIS — I10 PRIMARY HYPERTENSION: Primary | ICD-10-CM

## 2024-06-07 DIAGNOSIS — H91.93 BILATERAL CHANGE IN HEARING: ICD-10-CM

## 2024-06-07 DIAGNOSIS — Z12.31 ENCOUNTER FOR SCREENING MAMMOGRAM FOR MALIGNANT NEOPLASM OF BREAST: ICD-10-CM

## 2024-06-07 DIAGNOSIS — M54.31 BACK PAIN WITH RIGHT-SIDED SCIATICA: ICD-10-CM

## 2024-06-07 DIAGNOSIS — E56.9 VITAMIN DEFICIENCY: ICD-10-CM

## 2024-06-07 DIAGNOSIS — H53.9 VISUAL CHANGES: ICD-10-CM

## 2024-06-07 DIAGNOSIS — F32.A ANXIETY AND DEPRESSION: ICD-10-CM

## 2024-06-07 DIAGNOSIS — F41.9 ANXIETY AND DEPRESSION: ICD-10-CM

## 2024-06-07 PROBLEM — M67.919 DISORDER OF ROTATOR CUFF: Status: ACTIVE | Noted: 2024-06-07

## 2024-06-07 RX ORDER — BUPROPION HYDROCHLORIDE 450 MG/1
TABLET, FILM COATED, EXTENDED RELEASE ORAL
Qty: 90 TABLET | Refills: 1 | Status: SHIPPED | OUTPATIENT
Start: 2024-06-07

## 2024-06-07 RX ORDER — DULOXETIN HYDROCHLORIDE 60 MG/1
60 CAPSULE, DELAYED RELEASE ORAL DAILY
Qty: 90 CAPSULE | Refills: 0 | Status: SHIPPED | OUTPATIENT
Start: 2024-06-07

## 2024-06-07 RX ORDER — ACETAMINOPHEN 500 MG
TABLET ORAL
Qty: 1 KIT | Refills: 0 | Status: SHIPPED | OUTPATIENT
Start: 2024-06-07

## 2024-06-07 RX ORDER — METOPROLOL TARTRATE 25 MG/1
12.5 TABLET, FILM COATED ORAL 2 TIMES DAILY
Qty: 90 TABLET | Refills: 1 | Status: SHIPPED | OUTPATIENT
Start: 2024-06-07

## 2024-06-07 RX ORDER — MULTIVITAMIN
1 TABLET ORAL DAILY
Qty: 90 TABLET | Refills: 1 | Status: SHIPPED | OUTPATIENT
Start: 2024-06-07

## 2024-06-07 RX ORDER — MEMANTINE HYDROCHLORIDE 10 MG/1
10 TABLET ORAL 2 TIMES DAILY
Qty: 90 TABLET | Refills: 1 | Status: SHIPPED | OUTPATIENT
Start: 2024-06-07

## 2024-06-07 RX ORDER — METOPROLOL TARTRATE 25 MG/1
25 TABLET, FILM COATED ORAL 2 TIMES DAILY
Qty: 180 TABLET | Refills: 1 | Status: SHIPPED | OUTPATIENT
Start: 2024-06-07 | End: 2024-12-04

## 2024-06-07 RX ORDER — GABAPENTIN 100 MG/1
CAPSULE ORAL
Qty: 120 CAPSULE | Refills: 1 | Status: CANCELLED | OUTPATIENT
Start: 2024-06-07

## 2024-06-07 RX ORDER — GABAPENTIN 100 MG/1
100 CAPSULE ORAL 3 TIMES DAILY
Qty: 90 CAPSULE | Refills: 5 | Status: SHIPPED | OUTPATIENT
Start: 2024-06-07 | End: 2024-12-04

## 2024-06-07 NOTE — PROGRESS NOTES
"Subjective   Patient ID: Johanne Acosta is a 69 y.o. female who presents for Follow-up (Pt is here for HTN fuv.).    HPI The patient with htn,hld,palpitations,allergic rhinitis ,hypothyroidism,mild obesity, cataracts and neuropathic pain presents to the office for a follow up   the patient has a lot of back and neck  pain right now  is  using tylenol   I have given her gabapentin in the past  Her right eye she notices that right eye is darker and lighter on the left and would like to see an eye doctor   Her hearing is off on one ear and would like to see an audiologist   She is now thinking of moving to [place]   She has not been able to establish with her old  since she has moved back from [place]   Some relationships that she had established were disturbed with her move . I advised her to see Pain Management for her chronic pain   She is also due for a mammogram   Review of Systems Negative except what was mentioned in the HPI       Objective   /76 (Patient Position: Sitting)   Pulse 77   Temp 36.1 °C (97 °F)   Resp 16   Ht 1.651 m (5' 5\")   Wt 84.8 kg (186 lb 14.4 oz)   SpO2 99%   BMI 31.10 kg/m²     Physical Exam  Vitals and nursing note reviewed.   Constitutional:       Appearance: Normal appearance.   HENT:      Head: Normocephalic and atraumatic.      Right Ear: Tympanic membrane normal.      Left Ear: Tympanic membrane normal.      Nose: Nose normal.      Mouth/Throat:      Mouth: Mucous membranes are moist.   Eyes:      Extraocular Movements: Extraocular movements intact.      Conjunctiva/sclera: Conjunctivae normal.   Cardiovascular:      Rate and Rhythm: Normal rate and regular rhythm.      Pulses: Normal pulses.      Heart sounds: Normal heart sounds.   Pulmonary:      Effort: Pulmonary effort is normal.      Breath sounds: Normal breath sounds.   Abdominal:      General: Abdomen is flat. Bowel sounds are normal.      Palpations: Abdomen is soft.   Musculoskeletal:         General: " Tenderness present. Normal range of motion.      Cervical back: Normal range of motion and neck supple.      Comments: Tender areas over the neck and lower back   Skin:     General: Skin is warm and dry.   Neurological:      General: No focal deficit present.      Mental Status: She is alert and oriented to person, place, and time. Mental status is at baseline.   Psychiatric:         Mood and Affect: Mood normal.         Behavior: Behavior normal.         Thought Content: Thought content normal.         Judgment: Judgment normal.         Assessment/Plan   Problem List Items Addressed This Visit             ICD-10-CM    Anxiety and depression F41.9, F32.A    Relevant Medications    DULoxetine (Cymbalta) 60 mg DR capsule    buPROPion XL (Forfivo XL) 450 mg 24 hr tablet    Back pain with right-sided sciatica M54.31    Relevant Medications    gabapentin (Neurontin) 100 mg capsule    Other Relevant Orders    Referral to Physical Medicine Rehab    Hypertension - Primary I10    Relevant Medications    metoprolol tartrate (Lopressor) 25 mg tablet    blood pressure monitor kit    metoprolol tartrate (Lopressor) 25 mg tablet    Memory loss R41.3    Relevant Medications    memantine (Namenda) 10 mg tablet     Other Visit Diagnoses         Codes    Vitamin deficiency     E56.9    Relevant Medications    multivitamin (multivitamin with folic acid) tablet    Visual changes     H53.9    Relevant Orders    Referral to Ophthalmology    Bilateral change in hearing     H91.93    Relevant Orders    Referral to Audiology    Bunion of great toe of right foot     M21.611    Relevant Orders    Referral to Podiatry    Encounter for screening mammogram for malignant neoplasm of breast     Z12.31    Relevant Orders    BI mammo bilateral screening tomosynthesis

## 2024-06-07 NOTE — PATIENT INSTRUCTIONS
Kaylynn uses [employer] now   You save on gas using other transportation   Call for physical therapy for the cervical spine and lower back   Talk to psychiatry about what you can take for the traumatic brain injury symptoms   And for the fortivo    The blood pressure should be under 130 /80 or under and the goal heart rate should be around 60 to 70

## 2024-06-11 ENCOUNTER — OFFICE VISIT (OUTPATIENT)
Dept: PAIN MEDICINE | Facility: CLINIC | Age: 69
End: 2024-06-11
Payer: COMMERCIAL

## 2024-06-11 VITALS
RESPIRATION RATE: 10 BRPM | BODY MASS INDEX: 31.76 KG/M2 | OXYGEN SATURATION: 97 % | HEART RATE: 80 BPM | DIASTOLIC BLOOD PRESSURE: 80 MMHG | WEIGHT: 186 LBS | HEIGHT: 64 IN | TEMPERATURE: 97.7 F | SYSTOLIC BLOOD PRESSURE: 172 MMHG

## 2024-06-11 DIAGNOSIS — M54.42 CHRONIC BILATERAL LOW BACK PAIN WITH BILATERAL SCIATICA: ICD-10-CM

## 2024-06-11 DIAGNOSIS — M25.521 RIGHT ELBOW PAIN: ICD-10-CM

## 2024-06-11 DIAGNOSIS — M48.062 SPINAL STENOSIS OF LUMBAR REGION WITH NEUROGENIC CLAUDICATION: ICD-10-CM

## 2024-06-11 DIAGNOSIS — M54.41 CHRONIC BILATERAL LOW BACK PAIN WITH BILATERAL SCIATICA: ICD-10-CM

## 2024-06-11 DIAGNOSIS — G89.29 CHRONIC BILATERAL LOW BACK PAIN WITH BILATERAL SCIATICA: ICD-10-CM

## 2024-06-11 DIAGNOSIS — R26.89 BALANCE PROBLEM: ICD-10-CM

## 2024-06-11 DIAGNOSIS — M25.561 CHRONIC PAIN OF RIGHT KNEE: ICD-10-CM

## 2024-06-11 DIAGNOSIS — M48.02 SPINAL STENOSIS OF CERVICAL REGION: ICD-10-CM

## 2024-06-11 DIAGNOSIS — G89.29 CHRONIC PAIN OF RIGHT KNEE: ICD-10-CM

## 2024-06-11 DIAGNOSIS — R29.898 FINE MOTOR IMPAIRMENT: Primary | ICD-10-CM

## 2024-06-11 DIAGNOSIS — R29.818 FINE MOTOR IMPAIRMENT: Primary | ICD-10-CM

## 2024-06-11 PROCEDURE — 99213 OFFICE O/P EST LOW 20 MIN: CPT | Performed by: ANESTHESIOLOGY

## 2024-06-11 RX ORDER — CELECOXIB 200 MG/1
200 CAPSULE ORAL DAILY
Qty: 30 CAPSULE | Refills: 0 | Status: SHIPPED | OUTPATIENT
Start: 2024-06-11

## 2024-06-11 ASSESSMENT — COLUMBIA-SUICIDE SEVERITY RATING SCALE - C-SSRS
1. IN THE PAST MONTH, HAVE YOU WISHED YOU WERE DEAD OR WISHED YOU COULD GO TO SLEEP AND NOT WAKE UP?: NO
6. HAVE YOU EVER DONE ANYTHING, STARTED TO DO ANYTHING, OR PREPARED TO DO ANYTHING TO END YOUR LIFE?: NO
2. HAVE YOU ACTUALLY HAD ANY THOUGHTS OF KILLING YOURSELF?: NO

## 2024-06-11 ASSESSMENT — ENCOUNTER SYMPTOMS
NECK PAIN: 1
ADENOPATHY: 0
NUMBNESS: 1
ABDOMINAL PAIN: 0
BACK PAIN: 1
OCCASIONAL FEELINGS OF UNSTEADINESS: 1
DEPRESSION: 0
SHORTNESS OF BREATH: 0
DYSURIA: 0
WEAKNESS: 1
LOSS OF SENSATION IN FEET: 1
EYE PAIN: 0
FEVER: 0

## 2024-06-11 ASSESSMENT — ANXIETY QUESTIONNAIRES
4. TROUBLE RELAXING: SEVERAL DAYS
3. WORRYING TOO MUCH ABOUT DIFFERENT THINGS: NOT AT ALL
7. FEELING AFRAID AS IF SOMETHING AWFUL MIGHT HAPPEN: NOT AT ALL
IF YOU CHECKED OFF ANY PROBLEMS ON THIS QUESTIONNAIRE, HOW DIFFICULT HAVE THESE PROBLEMS MADE IT FOR YOU TO DO YOUR WORK, TAKE CARE OF THINGS AT HOME, OR GET ALONG WITH OTHER PEOPLE: SOMEWHAT DIFFICULT
1. FEELING NERVOUS, ANXIOUS, OR ON EDGE: NOT AT ALL
5. BEING SO RESTLESS THAT IT IS HARD TO SIT STILL: NOT AT ALL
2. NOT BEING ABLE TO STOP OR CONTROL WORRYING: NOT AT ALL
GAD7 TOTAL SCORE: 2
6. BECOMING EASILY ANNOYED OR IRRITABLE: SEVERAL DAYS

## 2024-06-11 ASSESSMENT — PATIENT HEALTH QUESTIONNAIRE - PHQ9
SUM OF ALL RESPONSES TO PHQ9 QUESTIONS 1 AND 2: 0
1. LITTLE INTEREST OR PLEASURE IN DOING THINGS: NOT AT ALL
2. FEELING DOWN, DEPRESSED OR HOPELESS: NOT AT ALL

## 2024-06-11 ASSESSMENT — PAIN DESCRIPTION - DESCRIPTORS: DESCRIPTORS: BURNING;TINGLING

## 2024-06-11 ASSESSMENT — PAIN SCALES - GENERAL
PAINLEVEL: 2
PAINLEVEL_OUTOF10: 2

## 2024-06-11 ASSESSMENT — PAIN - FUNCTIONAL ASSESSMENT: PAIN_FUNCTIONAL_ASSESSMENT: 0-10

## 2024-06-11 NOTE — PROGRESS NOTES
Chief Complain    Back Pain, Neck Pain, and New Patient Visit (Npv here for neck pain with tingling down to fingers. Can't turn head very much. Back pain due to a fall last year. Has a cage. Tingling into legs. Pail level is 2 while sitting. Worse when moving around. Constant burning. Has had xrays. Needs physical therapy referral for neck and back. Taking advil and tylenol with no relief)    History Of Present Illness  Johanne Acosta is a 69 y.o. female here for evaluation of neck and low back , radiating to right posterior thigh. The patient has been experiencing these symptoms for last 1 year(s). The patient describes the pain as burning. The patient's current pain score is 6-7 on a scale from 0-10. The pain is worsened by standing, prolonged sitting, and walking and is alleviated by lying down. Since the start of the symptoms the pain has been worse.    The patient denies any fever, chills, weight loss, weakness,  bladder/ bowel incontinence, history of cancer, history of IV drug abuse, recent trauma.      Past Medical History  She has a past medical history of Pain in right shoulder (04/20/2021), Personal history of other diseases of the musculoskeletal system and connective tissue (04/20/2021), and Personal history of other diseases of the musculoskeletal system and connective tissue (01/21/2021).    Surgical History  She has no past surgical history on file.    Social History  She reports that she has never smoked. She has never used smokeless tobacco. She reports that she does not currently use alcohol. She reports that she does not use drugs.    Family History  Family History   Problem Relation Name Age of Onset    Alcohol abuse Father      Other (heart problems) Father      Alcohol abuse Maternal Grandfather      Lung cancer Maternal Grandfather          Allergies  Corticosteroids (glucocorticoids), Penicillins, Aspirin, Buspirone, Codeine, Hydrocodone-acetaminophen, Prednisone, and Vancomycin    Review of  Systems  Review of Systems   Constitutional:  Negative for fever.   HENT:  Negative for ear pain.    Eyes:  Negative for pain.   Respiratory:  Negative for shortness of breath.    Cardiovascular:  Negative for chest pain.   Gastrointestinal:  Negative for abdominal pain.   Endocrine: Negative for cold intolerance and heat intolerance.   Genitourinary:  Negative for dysuria.   Musculoskeletal:  Positive for back pain and neck pain.   Skin:  Negative for rash.   Allergic/Immunologic: Negative for food allergies.   Neurological:  Positive for weakness and numbness.   Hematological:  Negative for adenopathy.   Psychiatric/Behavioral:  Negative for suicidal ideas.         Physical Exam  Physical Exam  Constitutional:       Appearance: Normal appearance.   HENT:      Head: Normocephalic and atraumatic.   Eyes:      Extraocular Movements: Extraocular movements intact.   Cardiovascular:      Rate and Rhythm: Normal rate and regular rhythm.   Pulmonary:      Effort: Pulmonary effort is normal.   Abdominal:      Palpations: Abdomen is soft.   Musculoskeletal:      Cervical back: Neck supple.   Skin:     General: Skin is warm.   Neurological:      Mental Status: She is alert and oriented to person, place, and time.      Motor: Motor function is intact.      Deep Tendon Reflexes:      Reflex Scores:       Tricep reflexes are 2+ on the right side and 2+ on the left side.       Bicep reflexes are 2+ on the right side and 2+ on the left side.       Brachioradialis reflexes are 2+ on the right side and 2+ on the left side.       Patellar reflexes are 2+ on the right side and 2+ on the left side.       Achilles reflexes are 2+ on the right side and 2+ on the left side.     Comments: Positive garduno's Bilaterally   Psychiatric:         Mood and Affect: Mood normal.         Behavior: Behavior normal.           Last Recorded Vitals  Blood pressure 172/80, pulse 80, temperature 36.5 °C (97.7 °F), temperature source Tympanic, resp. rate  "10, height 1.626 m (5' 4\"), weight 84.4 kg (186 lb), SpO2 97%.         Assessment/Plan   Encounter Diagnoses   Name Primary?    Chronic bilateral low back pain with bilateral sciatica     Spinal stenosis of cervical region     Chronic pain of right knee     Right elbow pain         Johanne Acosta is a 69 y.o. female here for evaluation of chronic neck, low back pain radiating to right posterior thigh.  Also experiencing tingling numbness in bilateral upper extremities as well as weak  strength on the left side.  She has been experiencing low back pain  for years previously was managed with trigger point injections with modest benefit, she is also status post L4-5, L5-S1 fusion by Dr. Rosas.  Now however for possible urinary show her pain has been getting worse she is unable to walk the length of time without being in severe pain.  In addition she does report issues with her balance, issues with her fine motor skills.  On physical examination she does have a positive Sima's test bilaterally.  Currently managing her pain with gabapentin 100 mg at bedtime, Tylenol and Advil with limited benefit.  Given the severity of her symptoms as well as issues with her balance and fine motor skills I would recommend getting imaging of her cervical spine and lumbar spine to rule out myelopathy pain further evaluation of known spinal stenosis at L3-4.  I would also trial her on Celebrex in place of Advil and increase the dose of gabapentin to 200 mg twice daily.  Follow-up after completion of imaging studies.        I spent 32 minutes in the professional and overall care of this patient.       Bobo Jackson MD  "

## 2024-06-13 ENCOUNTER — TELEPHONE (OUTPATIENT)
Dept: OBSTETRICS AND GYNECOLOGY | Facility: CLINIC | Age: 69
End: 2024-06-13
Payer: COMMERCIAL

## 2024-06-13 NOTE — TELEPHONE ENCOUNTER
PT CALLED AND STATED THAT THE INCREASE OF GABAPENTIN TO 2X DAILY IS MAKING HER WHOLE BODY JUMP AND TWITCH. WANTS TO KNOW WHAT TO DO

## 2024-06-18 ENCOUNTER — TELEPHONE (OUTPATIENT)
Dept: PAIN MEDICINE | Facility: CLINIC | Age: 69
End: 2024-06-18
Payer: COMMERCIAL

## 2024-06-25 ENCOUNTER — APPOINTMENT (OUTPATIENT)
Dept: ORTHOPEDIC SURGERY | Facility: CLINIC | Age: 69
End: 2024-06-25
Payer: COMMERCIAL

## 2024-06-25 ENCOUNTER — HOSPITAL ENCOUNTER (OUTPATIENT)
Dept: RADIOLOGY | Facility: HOSPITAL | Age: 69
Discharge: HOME | End: 2024-06-25
Payer: MEDICARE

## 2024-06-25 DIAGNOSIS — R29.818 FINE MOTOR IMPAIRMENT: ICD-10-CM

## 2024-06-25 DIAGNOSIS — R26.89 BALANCE PROBLEM: ICD-10-CM

## 2024-06-25 DIAGNOSIS — R29.898 FINE MOTOR IMPAIRMENT: ICD-10-CM

## 2024-06-25 DIAGNOSIS — M54.41 CHRONIC BILATERAL LOW BACK PAIN WITH BILATERAL SCIATICA: ICD-10-CM

## 2024-06-25 DIAGNOSIS — M54.42 CHRONIC BILATERAL LOW BACK PAIN WITH BILATERAL SCIATICA: ICD-10-CM

## 2024-06-25 DIAGNOSIS — G89.29 CHRONIC BILATERAL LOW BACK PAIN WITH BILATERAL SCIATICA: ICD-10-CM

## 2024-06-25 PROCEDURE — 72141 MRI NECK SPINE W/O DYE: CPT | Performed by: RADIOLOGY

## 2024-06-25 PROCEDURE — 72141 MRI NECK SPINE W/O DYE: CPT

## 2024-06-25 PROCEDURE — 72148 MRI LUMBAR SPINE W/O DYE: CPT

## 2024-06-25 PROCEDURE — 72148 MRI LUMBAR SPINE W/O DYE: CPT | Performed by: RADIOLOGY

## 2024-07-02 ENCOUNTER — APPOINTMENT (OUTPATIENT)
Dept: NEUROSURGERY | Facility: CLINIC | Age: 69
End: 2024-07-02
Payer: COMMERCIAL

## 2024-07-02 ENCOUNTER — TELEPHONE (OUTPATIENT)
Dept: PAIN MEDICINE | Facility: CLINIC | Age: 69
End: 2024-07-02
Payer: COMMERCIAL

## 2024-07-02 ENCOUNTER — OFFICE VISIT (OUTPATIENT)
Dept: PAIN MEDICINE | Facility: CLINIC | Age: 69
End: 2024-07-02
Payer: COMMERCIAL

## 2024-07-02 VITALS
DIASTOLIC BLOOD PRESSURE: 92 MMHG | HEART RATE: 97 BPM | TEMPERATURE: 97.2 F | BODY MASS INDEX: 32.96 KG/M2 | HEIGHT: 63 IN | OXYGEN SATURATION: 97 % | SYSTOLIC BLOOD PRESSURE: 192 MMHG | RESPIRATION RATE: 18 BRPM | WEIGHT: 186 LBS

## 2024-07-02 DIAGNOSIS — M25.552 LEFT HIP PAIN: ICD-10-CM

## 2024-07-02 DIAGNOSIS — M48.062 SPINAL STENOSIS OF LUMBAR REGION WITH NEUROGENIC CLAUDICATION: Primary | ICD-10-CM

## 2024-07-02 PROCEDURE — 1125F AMNT PAIN NOTED PAIN PRSNT: CPT | Performed by: ANESTHESIOLOGY

## 2024-07-02 PROCEDURE — 1159F MED LIST DOCD IN RCRD: CPT | Performed by: ANESTHESIOLOGY

## 2024-07-02 PROCEDURE — 3077F SYST BP >= 140 MM HG: CPT | Performed by: ANESTHESIOLOGY

## 2024-07-02 PROCEDURE — 1036F TOBACCO NON-USER: CPT | Performed by: ANESTHESIOLOGY

## 2024-07-02 PROCEDURE — 99214 OFFICE O/P EST MOD 30 MIN: CPT | Performed by: ANESTHESIOLOGY

## 2024-07-02 PROCEDURE — 3080F DIAST BP >= 90 MM HG: CPT | Performed by: ANESTHESIOLOGY

## 2024-07-02 RX ORDER — KETOROLAC TROMETHAMINE 30 MG/ML
30 INJECTION, SOLUTION INTRAMUSCULAR; INTRAVENOUS ONCE
Status: SHIPPED | OUTPATIENT
Start: 2024-07-02 | End: 2024-07-07

## 2024-07-02 SDOH — ECONOMIC STABILITY: FOOD INSECURITY: WITHIN THE PAST 12 MONTHS, THE FOOD YOU BOUGHT JUST DIDN'T LAST AND YOU DIDN'T HAVE MONEY TO GET MORE.: SOMETIMES TRUE

## 2024-07-02 SDOH — ECONOMIC STABILITY: FOOD INSECURITY: WITHIN THE PAST 12 MONTHS, YOU WORRIED THAT YOUR FOOD WOULD RUN OUT BEFORE YOU GOT MONEY TO BUY MORE.: SOMETIMES TRUE

## 2024-07-02 ASSESSMENT — ENCOUNTER SYMPTOMS
ARTHRALGIAS: 1
FEVER: 0
BACK PAIN: 1
DEPRESSION: 1
OCCASIONAL FEELINGS OF UNSTEADINESS: 1
SHORTNESS OF BREATH: 1
LOSS OF SENSATION IN FEET: 1

## 2024-07-02 ASSESSMENT — PATIENT HEALTH QUESTIONNAIRE - PHQ9
4. FEELING TIRED OR HAVING LITTLE ENERGY: MORE THAN HALF THE DAYS
SUM OF ALL RESPONSES TO PHQ9 QUESTIONS 1 AND 2: 4
1. LITTLE INTEREST OR PLEASURE IN DOING THINGS: MORE THAN HALF THE DAYS
3. TROUBLE FALLING OR STAYING ASLEEP OR SLEEPING TOO MUCH: MORE THAN HALF THE DAYS
5. POOR APPETITE OR OVEREATING: MORE THAN HALF THE DAYS
9. THOUGHTS THAT YOU WOULD BE BETTER OFF DEAD, OR OF HURTING YOURSELF: NOT AT ALL
SUM OF ALL RESPONSES TO PHQ QUESTIONS 1-9: 14
2. FEELING DOWN, DEPRESSED OR HOPELESS: MORE THAN HALF THE DAYS
6. FEELING BAD ABOUT YOURSELF - OR THAT YOU ARE A FAILURE OR HAVE LET YOURSELF OR YOUR FAMILY DOWN: MORE THAN HALF THE DAYS
8. MOVING OR SPEAKING SO SLOWLY THAT OTHER PEOPLE COULD HAVE NOTICED. OR THE OPPOSITE, BEING SO FIGETY OR RESTLESS THAT YOU HAVE BEEN MOVING AROUND A LOT MORE THAN USUAL: NOT AT ALL
7. TROUBLE CONCENTRATING ON THINGS, SUCH AS READING THE NEWSPAPER OR WATCHING TELEVISION: MORE THAN HALF THE DAYS
10. IF YOU CHECKED OFF ANY PROBLEMS, HOW DIFFICULT HAVE THESE PROBLEMS MADE IT FOR YOU TO DO YOUR WORK, TAKE CARE OF THINGS AT HOME, OR GET ALONG WITH OTHER PEOPLE: EXTREMELY DIFFICULT

## 2024-07-02 ASSESSMENT — PAIN - FUNCTIONAL ASSESSMENT: PAIN_FUNCTIONAL_ASSESSMENT: 0-10

## 2024-07-02 ASSESSMENT — LIFESTYLE VARIABLES
HOW MANY STANDARD DRINKS CONTAINING ALCOHOL DO YOU HAVE ON A TYPICAL DAY: PATIENT DOES NOT DRINK
HOW OFTEN DO YOU HAVE SIX OR MORE DRINKS ON ONE OCCASION: NEVER
SKIP TO QUESTIONS 9-10: 1
AUDIT-C TOTAL SCORE: 0
HOW OFTEN DO YOU HAVE A DRINK CONTAINING ALCOHOL: NEVER

## 2024-07-02 ASSESSMENT — COLUMBIA-SUICIDE SEVERITY RATING SCALE - C-SSRS
2. HAVE YOU ACTUALLY HAD ANY THOUGHTS OF KILLING YOURSELF?: NO
1. IN THE PAST MONTH, HAVE YOU WISHED YOU WERE DEAD OR WISHED YOU COULD GO TO SLEEP AND NOT WAKE UP?: NO
6. HAVE YOU EVER DONE ANYTHING, STARTED TO DO ANYTHING, OR PREPARED TO DO ANYTHING TO END YOUR LIFE?: NO

## 2024-07-02 ASSESSMENT — PAIN SCALES - GENERAL
PAINLEVEL_OUTOF10: 9
PAINLEVEL: 9

## 2024-07-02 ASSESSMENT — PAIN DESCRIPTION - DESCRIPTORS: DESCRIPTORS: ACHING;BURNING;RADIATING

## 2024-07-02 NOTE — LETTER
Date: 2024  RE:  Johanne Acosta  :  1955      To Whom It May Concern:    Our patient, Johanne, has been under our care and now may return back to work without restrictions.    Their return to work date is:  2024    If you have questions concerning this patient's immediate care, please feel free to contact our office at [phone]    Sincerely,      Leia Castano LPN  Supervising Provider: Bobo Jackson

## 2024-07-02 NOTE — PROGRESS NOTES
Chief Complain  Follow-up (for pain in knees back, hands and neck. Would like to have a Mri review)       History Of Present Illness  Johanne Acosta is a 69 y.o. female here for chronic low back pain, neck pain . The patient rates the pain at 9  on a scale from 0-10.  The patient describes pain as aching, radiating, burning.  The pain is worsened by standing, walking, and straightening   and is alleviated by sitting.  Since the last visit the pain has worsened.  The patient denies any fever, chills, weight loss, bladder/bowel incontinence.       Past Medical History  She has a past medical history of Pain in right shoulder (04/20/2021), Personal history of other diseases of the musculoskeletal system and connective tissue (04/20/2021), and Personal history of other diseases of the musculoskeletal system and connective tissue (01/21/2021).    Surgical History  She has no past surgical history on file.     Social History  She reports that she has never smoked. She has never used smokeless tobacco. She reports that she does not currently use alcohol. She reports that she does not use drugs.    Family History  Family History   Problem Relation Name Age of Onset    Alcohol abuse Father      Other (heart problems) Father      Alcohol abuse Maternal Grandfather      Lung cancer Maternal Grandfather          Allergies  Corticosteroids (glucocorticoids), Penicillins, Aspirin, Buspirone, Codeine, Hydrocodone-acetaminophen, Prednisone, and Vancomycin    Review of Systems  Review of Systems   Constitutional:  Negative for fever.   HENT:  Positive for tinnitus.    Respiratory:  Positive for shortness of breath.    Cardiovascular:  Negative for chest pain.   Musculoskeletal:  Positive for arthralgias and back pain.   Psychiatric/Behavioral:  Negative for suicidal ideas.         Physical Exam  Physical Exam  Eyes:      Extraocular Movements: Extraocular movements intact.   Pulmonary:      Effort: Pulmonary effort is normal.  "  Skin:     General: Skin is warm.   Neurological:      Mental Status: She is alert and oriented to person, place, and time.   Psychiatric:         Mood and Affect: Mood normal.         Last Recorded Vitals  Blood pressure (!) 192/92, pulse 97, temperature 36.2 °C (97.2 °F), resp. rate 18, height 1.6 m (5' 3\"), weight 84.4 kg (186 lb), SpO2 97%.       Assessment/Plan     Johanne Acosta is a 69 y.o. female here for follow-up of Chronic low back pain, neck pain, tingling numbness bilateral upper extremities, more recently she had a fall yesterday since then she has been experiencing more pain in her knees as well as left hip she is having hard time lifting her left hip.  She is here to review the MRI of her cervical and lumbar spine.  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  She may benefit from an epidural steroid injection.  In addition she does have degenerative changes of her cervical spine however no signs of myelopathy.  The upper extremity numbness which she experiences with flexion at the elbow may be due to ulnar neuropathy, I would recommend using ulnar brace if she does not improve would consider getting an EMG.    However I was mostly concerned about her recent fall and increased pain in her right hip as well as inability to lift it, I strongly recommended her going to the ER for further evaluation and rule out any acute bony injuries.    Total time spent caring for the patient today was 40 minutes. This includes time spent before the visit reviewing the chart, time spent during the visit, and time spent after the visit on documentation.      Bobo Jackson MD  "

## 2024-07-08 ENCOUNTER — APPOINTMENT (OUTPATIENT)
Dept: NEUROLOGY | Facility: CLINIC | Age: 69
End: 2024-07-08
Payer: COMMERCIAL

## 2024-07-17 ENCOUNTER — APPOINTMENT (OUTPATIENT)
Dept: PHYSICAL THERAPY | Facility: CLINIC | Age: 69
End: 2024-07-17
Payer: MEDICARE

## 2024-07-24 ENCOUNTER — APPOINTMENT (OUTPATIENT)
Dept: PRIMARY CARE | Facility: CLINIC | Age: 69
End: 2024-07-24
Payer: MEDICARE

## 2024-07-24 ENCOUNTER — APPOINTMENT (OUTPATIENT)
Dept: PHYSICAL THERAPY | Facility: CLINIC | Age: 69
End: 2024-07-24
Payer: MEDICARE

## 2024-07-24 NOTE — PATIENT INSTRUCTIONS
-Ice on and off for the next 24 hours if injection sites are sore. Do gentle range of motion exercises in each area that was injected. Try to do them every hour for about half a minute or so, in every direction that the affected part goes. No pool, bath, or hot tub today. Avoid heavy lifting for the next 2 days.    -Resume daily home exercises. Consider aqua therapy and physical therapy for the lower back in the future  -Consider trigger point injections, handout provided previously  -Consider referral for SI joint RFA, lumbar facet block (without steroid), ketamine infusion, spinal cord stimulator trial  -Consider medications  -Proceed with back surgeon appointment  -Follow-up as needed . If you want repeat SI joint injections, then make sure the appointment is scheduled as an ultrasound appointment.
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
## 2024-07-25 ENCOUNTER — APPOINTMENT (OUTPATIENT)
Dept: PHYSICAL MEDICINE AND REHAB | Facility: CLINIC | Age: 69
End: 2024-07-25
Payer: COMMERCIAL

## 2024-07-25 ENCOUNTER — HOSPITAL ENCOUNTER (OUTPATIENT)
Dept: RADIOLOGY | Facility: EXTERNAL LOCATION | Age: 69
Discharge: HOME | End: 2024-07-25

## 2024-07-25 VITALS
HEIGHT: 63 IN | HEART RATE: 88 BPM | SYSTOLIC BLOOD PRESSURE: 162 MMHG | TEMPERATURE: 97.2 F | OXYGEN SATURATION: 97 % | DIASTOLIC BLOOD PRESSURE: 99 MMHG | BODY MASS INDEX: 34.38 KG/M2 | WEIGHT: 194 LBS

## 2024-07-25 DIAGNOSIS — M54.50 CHRONIC LOW BACK PAIN, UNSPECIFIED BACK PAIN LATERALITY, UNSPECIFIED WHETHER SCIATICA PRESENT: ICD-10-CM

## 2024-07-25 DIAGNOSIS — G89.29 CHRONIC LOW BACK PAIN, UNSPECIFIED BACK PAIN LATERALITY, UNSPECIFIED WHETHER SCIATICA PRESENT: ICD-10-CM

## 2024-07-25 DIAGNOSIS — M54.31 BACK PAIN WITH RIGHT-SIDED SCIATICA: Primary | ICD-10-CM

## 2024-07-25 DIAGNOSIS — M79.2 NEUROPATHIC PAIN: ICD-10-CM

## 2024-07-25 DIAGNOSIS — M53.3 SACROILIAC JOINT PAIN: ICD-10-CM

## 2024-07-25 DIAGNOSIS — M25.552 HIP PAIN, LEFT: ICD-10-CM

## 2024-07-25 DIAGNOSIS — M16.10 HIP ARTHRITIS: ICD-10-CM

## 2024-07-25 DIAGNOSIS — M53.3 SACROILIAC JOINT DYSFUNCTION OF BOTH SIDES: ICD-10-CM

## 2024-07-25 DIAGNOSIS — M79.18 MYOFASCIAL PAIN: ICD-10-CM

## 2024-07-25 PROCEDURE — 1159F MED LIST DOCD IN RCRD: CPT | Performed by: PHYSICAL MEDICINE & REHABILITATION

## 2024-07-25 PROCEDURE — 99214 OFFICE O/P EST MOD 30 MIN: CPT | Performed by: PHYSICAL MEDICINE & REHABILITATION

## 2024-07-25 PROCEDURE — 3080F DIAST BP >= 90 MM HG: CPT | Performed by: PHYSICAL MEDICINE & REHABILITATION

## 2024-07-25 PROCEDURE — 1125F AMNT PAIN NOTED PAIN PRSNT: CPT | Performed by: PHYSICAL MEDICINE & REHABILITATION

## 2024-07-25 PROCEDURE — 20611 DRAIN/INJ JOINT/BURSA W/US: CPT | Performed by: PHYSICAL MEDICINE & REHABILITATION

## 2024-07-25 PROCEDURE — 3077F SYST BP >= 140 MM HG: CPT | Performed by: PHYSICAL MEDICINE & REHABILITATION

## 2024-07-25 PROCEDURE — 3008F BODY MASS INDEX DOCD: CPT | Performed by: PHYSICAL MEDICINE & REHABILITATION

## 2024-07-25 RX ORDER — TRIAMCINOLONE ACETONIDE 40 MG/ML
40 INJECTION, SUSPENSION INTRA-ARTICULAR; INTRAMUSCULAR ONCE
Status: COMPLETED | OUTPATIENT
Start: 2024-07-25 | End: 2024-07-25

## 2024-07-25 ASSESSMENT — PAIN SCALES - GENERAL: PAINLEVEL: 8

## 2024-08-06 ENCOUNTER — OFFICE VISIT (OUTPATIENT)
Dept: NEUROSURGERY | Facility: CLINIC | Age: 69
End: 2024-08-06
Payer: COMMERCIAL

## 2024-08-06 ENCOUNTER — TELEPHONE (OUTPATIENT)
Dept: OBSTETRICS AND GYNECOLOGY | Facility: CLINIC | Age: 69
End: 2024-08-06

## 2024-08-06 ENCOUNTER — APPOINTMENT (OUTPATIENT)
Dept: OTOLARYNGOLOGY | Facility: CLINIC | Age: 69
End: 2024-08-06
Payer: COMMERCIAL

## 2024-08-06 VITALS
BODY MASS INDEX: 34.38 KG/M2 | HEART RATE: 83 BPM | WEIGHT: 194 LBS | DIASTOLIC BLOOD PRESSURE: 76 MMHG | HEIGHT: 63 IN | TEMPERATURE: 97 F | SYSTOLIC BLOOD PRESSURE: 118 MMHG

## 2024-08-06 DIAGNOSIS — Z98.1 STATUS POST LUMBAR AND LUMBOSACRAL FUSION BY ANTERIOR TECHNIQUE: ICD-10-CM

## 2024-08-06 DIAGNOSIS — M43.16 SPONDYLOLISTHESIS OF LUMBAR REGION: Primary | ICD-10-CM

## 2024-08-06 DIAGNOSIS — M47.22 CERVICAL SPONDYLOSIS WITH RADICULOPATHY: ICD-10-CM

## 2024-08-06 PROCEDURE — 3074F SYST BP LT 130 MM HG: CPT | Performed by: NEUROLOGICAL SURGERY

## 2024-08-06 PROCEDURE — 1125F AMNT PAIN NOTED PAIN PRSNT: CPT | Performed by: NEUROLOGICAL SURGERY

## 2024-08-06 PROCEDURE — 3008F BODY MASS INDEX DOCD: CPT | Performed by: NEUROLOGICAL SURGERY

## 2024-08-06 PROCEDURE — 1159F MED LIST DOCD IN RCRD: CPT | Performed by: NEUROLOGICAL SURGERY

## 2024-08-06 PROCEDURE — 99215 OFFICE O/P EST HI 40 MIN: CPT | Performed by: NEUROLOGICAL SURGERY

## 2024-08-06 PROCEDURE — 3078F DIAST BP <80 MM HG: CPT | Performed by: NEUROLOGICAL SURGERY

## 2024-08-06 PROCEDURE — 1036F TOBACCO NON-USER: CPT | Performed by: NEUROLOGICAL SURGERY

## 2024-08-06 ASSESSMENT — PAIN SCALES - GENERAL: PAINLEVEL: 7

## 2024-08-06 ASSESSMENT — ENCOUNTER SYMPTOMS: OCCASIONAL FEELINGS OF UNSTEADINESS: 1
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
## 2024-08-13 ENCOUNTER — APPOINTMENT (OUTPATIENT)
Dept: PAIN MEDICINE | Facility: CLINIC | Age: 69
End: 2024-08-13
Payer: COMMERCIAL

## 2024-08-22 ENCOUNTER — APPOINTMENT (OUTPATIENT)
Dept: RADIOLOGY | Facility: HOSPITAL | Age: 69
End: 2024-08-22
Payer: COMMERCIAL

## 2024-08-22 ENCOUNTER — APPOINTMENT (OUTPATIENT)
Dept: RADIOLOGY | Facility: CLINIC | Age: 69
End: 2024-08-22
Payer: COMMERCIAL

## 2024-08-28 ENCOUNTER — OUTSIDE PROCEDURE (OUTPATIENT)
Dept: NEUROSURGERY | Facility: HOSPITAL | Age: 69
End: 2024-08-28
Payer: COMMERCIAL

## 2024-08-31 ENCOUNTER — APPOINTMENT (OUTPATIENT)
Dept: RADIOLOGY | Facility: HOSPITAL | Age: 69
End: 2024-08-31
Payer: COMMERCIAL

## 2024-08-31 ENCOUNTER — HOSPITAL ENCOUNTER (OUTPATIENT)
Facility: HOSPITAL | Age: 69
Setting detail: OBSERVATION
End: 2024-08-31
Attending: EMERGENCY MEDICINE | Admitting: INTERNAL MEDICINE
Payer: COMMERCIAL

## 2024-08-31 DIAGNOSIS — M54.50 CHRONIC LOW BACK PAIN WITHOUT SCIATICA, UNSPECIFIED BACK PAIN LATERALITY: ICD-10-CM

## 2024-08-31 DIAGNOSIS — G89.29 CHRONIC LOW BACK PAIN WITHOUT SCIATICA, UNSPECIFIED BACK PAIN LATERALITY: ICD-10-CM
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
## 2024-08-31 LAB
ABO GROUP (TYPE) IN BLOOD: NORMAL
ALBUMIN SERPL BCP-MCNC: 3.4 G/DL (ref 3.4–5)
ALP SERPL-CCNC: 55 U/L (ref 33–136)
ALT SERPL W P-5'-P-CCNC: 22 U/L (ref 7–45)
ANION GAP SERPL CALC-SCNC: 10 MMOL/L (ref 10–20)
ANION GAP SERPL CALC-SCNC: 11 MMOL/L (ref 10–20)
ANTIBODY SCREEN: NORMAL
AST SERPL W P-5'-P-CCNC: 23 U/L (ref 9–39)
BASOPHILS # BLD AUTO: 0.03 X10*3/UL (ref 0–0.1)
BASOPHILS NFR BLD AUTO: 0.4 %
BILIRUB SERPL-MCNC: 0.6 MG/DL (ref 0–1.2)
BLOOD EXPIRATION DATE: NORMAL
BUN SERPL-MCNC: 8 MG/DL (ref 6–23)
BUN SERPL-MCNC: 9 MG/DL (ref 6–23)
CALCIUM SERPL-MCNC: 8.6 MG/DL (ref 8.6–10.3)
CALCIUM SERPL-MCNC: 8.8 MG/DL (ref 8.6–10.3)
CHLORIDE SERPL-SCNC: 100 MMOL/L (ref 98–107)
CHLORIDE SERPL-SCNC: 102 MMOL/L (ref 98–107)
CO2 SERPL-SCNC: 26 MMOL/L (ref 21–32)
CO2 SERPL-SCNC: 28 MMOL/L (ref 21–32)
CREAT SERPL-MCNC: 0.54 MG/DL (ref 0.5–1.05)
CREAT SERPL-MCNC: 0.61 MG/DL (ref 0.5–1.05)
DISPENSE STATUS: NORMAL
EGFRCR SERPLBLD CKD-EPI 2021: >90 ML/MIN/1.73M*2
EGFRCR SERPLBLD CKD-EPI 2021: >90 ML/MIN/1.73M*2
EOSINOPHIL # BLD AUTO: 0.03 X10*3/UL (ref 0–0.7)
EOSINOPHIL NFR BLD AUTO: 0.4 %
ERYTHROCYTE [DISTWIDTH] IN BLOOD BY AUTOMATED COUNT: 11.9 % (ref 11.5–14.5)
ERYTHROCYTE [DISTWIDTH] IN BLOOD BY AUTOMATED COUNT: 12.9 % (ref 11.5–14.5)
GLUCOSE SERPL-MCNC: 148 MG/DL (ref 74–99)
GLUCOSE SERPL-MCNC: 149 MG/DL (ref 74–99)
HCT VFR BLD AUTO: 21.7 % (ref 36–46)
HCT VFR BLD AUTO: 28.4 % (ref 36–46)
HGB BLD-MCNC: 7.5 G/DL (ref 12–16)
HGB BLD-MCNC: 9.7 G/DL (ref 12–16)
IMM GRANULOCYTES # BLD AUTO: 0.04 X10*3/UL (ref 0–0.7)
IMM GRANULOCYTES NFR BLD AUTO: 0.6 % (ref 0–0.9)
LYMPHOCYTES # BLD AUTO: 0.54 X10*3/UL (ref 1.2–4.8)
LYMPHOCYTES NFR BLD AUTO: 7.8 %
MCH RBC QN AUTO: 32.3 PG (ref 26–34)
MCH RBC QN AUTO: 33.5 PG (ref 26–34)
MCHC RBC AUTO-ENTMCNC: 34.2 G/DL (ref 32–36)
MCHC RBC AUTO-ENTMCNC: 34.6 G/DL (ref 32–36)
MCV RBC AUTO: 95 FL (ref 80–100)
MCV RBC AUTO: 97 FL (ref 80–100)
MONOCYTES # BLD AUTO: 0.59 X10*3/UL (ref 0.1–1)
MONOCYTES NFR BLD AUTO: 8.6 %
NEUTROPHILS # BLD AUTO: 5.65 X10*3/UL (ref 1.2–7.7)
NEUTROPHILS NFR BLD AUTO: 82.2 %
NRBC BLD-RTO: 0 /100 WBCS (ref 0–0)
NRBC BLD-RTO: 0 /100 WBCS (ref 0–0)
PLATELET # BLD AUTO: 128 X10*3/UL (ref 150–450)
PLATELET # BLD AUTO: 148 X10*3/UL (ref 150–450)
POTASSIUM SERPL-SCNC: 3.4 MMOL/L (ref 3.5–5.3)
POTASSIUM SERPL-SCNC: 3.5 MMOL/L (ref 3.5–5.3)
PRODUCT BLOOD TYPE: 5100
PRODUCT CODE: NORMAL
PROT SERPL-MCNC: 5.4 G/DL (ref 6.4–8.2)
RBC # BLD AUTO: 2.24 X10*6/UL (ref 4–5.2)
RBC # BLD AUTO: 3 X10*6/UL (ref 4–5.2)
RH FACTOR (ANTIGEN D): NORMAL
SODIUM SERPL-SCNC: 135 MMOL/L (ref 136–145)
SODIUM SERPL-SCNC: 135 MMOL/L (ref 136–145)
UNIT ABO: NORMAL
UNIT NUMBER: NORMAL
UNIT RH: NORMAL
UNIT VOLUME: 350
WBC # BLD AUTO: 6.9 X10*3/UL (ref 4.4–11.3)
WBC # BLD AUTO: 7.3 X10*3/UL (ref 4.4–11.3)
XM INTEP: NORMAL

## 2024-08-31 PROCEDURE — G0378 HOSPITAL OBSERVATION PER HR: HCPCS

## 2024-08-31 PROCEDURE — 73523 X-RAY EXAM HIPS BI 5/> VIEWS: CPT | Mod: BILATERAL PROCEDURE | Performed by: RADIOLOGY

## 2024-08-31 PROCEDURE — 74176 CT ABD & PELVIS W/O CONTRAST: CPT | Performed by: RADIOLOGY

## 2024-08-31 PROCEDURE — 73522 X-RAY EXAM HIPS BI 3-4 VIEWS: CPT

## 2024-08-31 PROCEDURE — P9016 RBC LEUKOCYTES REDUCED: HCPCS

## 2024-08-31 PROCEDURE — 70450 CT HEAD/BRAIN W/O DYE: CPT

## 2024-08-31 PROCEDURE — 2500000001 HC RX 250 WO HCPCS SELF ADMINISTERED DRUGS (ALT 637 FOR MEDICARE OP): Performed by: PHYSICIAN ASSISTANT

## 2024-08-31 PROCEDURE — 2500000004 HC RX 250 GENERAL PHARMACY W/ HCPCS (ALT 636 FOR OP/ED)

## 2024-08-31 PROCEDURE — 86901 BLOOD TYPING SEROLOGIC RH(D): CPT

## 2024-08-31 PROCEDURE — 72128 CT CHEST SPINE W/O DYE: CPT

## 2024-08-31 PROCEDURE — 72128 CT CHEST SPINE W/O DYE: CPT | Performed by: RADIOLOGY

## 2024-08-31 PROCEDURE — 80053 COMPREHEN METABOLIC PANEL: CPT | Performed by: PHYSICIAN ASSISTANT

## 2024-08-31 PROCEDURE — 86920 COMPATIBILITY TEST SPIN: CPT

## 2024-08-31 PROCEDURE — 72131 CT LUMBAR SPINE W/O DYE: CPT | Performed by: RADIOLOGY

## 2024-08-31 PROCEDURE — 71250 CT THORAX DX C-: CPT | Performed by: RADIOLOGY

## 2024-08-31 PROCEDURE — 72125 CT NECK SPINE W/O DYE: CPT

## 2024-08-31 PROCEDURE — 96374 THER/PROPH/DIAG INJ IV PUSH: CPT

## 2024-08-31 PROCEDURE — 80048 BASIC METABOLIC PNL TOTAL CA: CPT | Mod: CCI | Performed by: PHYSICIAN ASSISTANT

## 2024-08-31 PROCEDURE — 85027 COMPLETE CBC AUTOMATED: CPT | Performed by: PHYSICIAN ASSISTANT

## 2024-08-31 PROCEDURE — 2500000004 HC RX 250 GENERAL PHARMACY W/ HCPCS (ALT 636 FOR OP/ED): Performed by: PHYSICIAN ASSISTANT

## 2024-08-31 PROCEDURE — 99221 1ST HOSP IP/OBS SF/LOW 40: CPT | Performed by: NEUROLOGICAL SURGERY

## 2024-08-31 PROCEDURE — 36415 COLL VENOUS BLD VENIPUNCTURE: CPT

## 2024-08-31 PROCEDURE — 72125 CT NECK SPINE W/O DYE: CPT | Performed by: RADIOLOGY

## 2024-08-31 PROCEDURE — 70450 CT HEAD/BRAIN W/O DYE: CPT | Performed by: RADIOLOGY

## 2024-08-31 PROCEDURE — 85025 COMPLETE CBC W/AUTO DIFF WBC: CPT | Performed by: PHYSICIAN ASSISTANT

## 2024-08-31 PROCEDURE — 74176 CT ABD & PELVIS W/O CONTRAST: CPT

## 2024-08-31 PROCEDURE — 96361 HYDRATE IV INFUSION ADD-ON: CPT

## 2024-08-31 PROCEDURE — 96375 TX/PRO/DX INJ NEW DRUG ADDON: CPT

## 2024-08-31 PROCEDURE — 36430 TRANSFUSION BLD/BLD COMPNT: CPT

## 2024-08-31 PROCEDURE — 96376 TX/PRO/DX INJ SAME DRUG ADON: CPT

## 2024-08-31 PROCEDURE — 99285 EMERGENCY DEPT VISIT HI MDM: CPT | Mod: 25

## 2024-08-31 PROCEDURE — 36415 COLL VENOUS BLD VENIPUNCTURE: CPT | Performed by: PHYSICIAN ASSISTANT

## 2024-08-31 PROCEDURE — 72131 CT LUMBAR SPINE W/O DYE: CPT

## 2024-08-31 RX ORDER — ONDANSETRON 4 MG/1
4 TABLET, FILM COATED ORAL EVERY 8 HOURS PRN
Status: DISCONTINUED | OUTPATIENT
Start: 2024-08-31 | End: 2024-09-03 | Stop reason: HOSPADM

## 2024-08-31 RX ORDER — DULOXETIN HYDROCHLORIDE 30 MG/1
60 CAPSULE, DELAYED RELEASE ORAL DAILY
Status: DISCONTINUED | OUTPATIENT
Start: 2024-08-31 | End: 2024-09-03 | Stop reason: HOSPADM

## 2024-08-31 RX ORDER — SODIUM CHLORIDE, SODIUM LACTATE, POTASSIUM CHLORIDE, CALCIUM CHLORIDE 600; 310; 30; 20 MG/100ML; MG/100ML; MG/100ML; MG/100ML
100 INJECTION, SOLUTION INTRAVENOUS CONTINUOUS
Status: ACTIVE | OUTPATIENT
Start: 2024-08-31 | End: 2024-08-31

## 2024-08-31 RX ORDER — DEXTROSE 50 % IN WATER (D50W) INTRAVENOUS SYRINGE
12.5
Status: CANCELLED | OUTPATIENT
Start: 2024-08-31

## 2024-08-31 RX ORDER — HYDROMORPHONE HYDROCHLORIDE 1 MG/ML
1 INJECTION, SOLUTION INTRAMUSCULAR; INTRAVENOUS; SUBCUTANEOUS EVERY 4 HOURS PRN
Status: DISCONTINUED | OUTPATIENT
Start: 2024-08-31 | End: 2024-09-01

## 2024-08-31 RX ORDER — DEXTROSE 50 % IN WATER (D50W) INTRAVENOUS SYRINGE
25
Status: CANCELLED | OUTPATIENT
Start: 2024-08-31

## 2024-08-31 RX ORDER — MORPHINE SULFATE 2 MG/ML
2 INJECTION, SOLUTION INTRAMUSCULAR; INTRAVENOUS EVERY 2 HOUR PRN
Status: CANCELLED | OUTPATIENT
Start: 2024-08-31

## 2024-08-31 RX ORDER — BUPROPION HYDROCHLORIDE 150 MG/1
450 TABLET ORAL DAILY
Status: DISCONTINUED | OUTPATIENT
Start: 2024-08-31 | End: 2024-09-03 | Stop reason: HOSPADM

## 2024-08-31 RX ORDER — FENTANYL CITRATE 50 UG/ML
50 INJECTION, SOLUTION INTRAMUSCULAR; INTRAVENOUS ONCE
Status: COMPLETED | OUTPATIENT
Start: 2024-08-31 | End: 2024-08-31

## 2024-08-31 RX ORDER — NALOXONE HYDROCHLORIDE 1 MG/ML
0.2 INJECTION INTRAMUSCULAR; INTRAVENOUS; SUBCUTANEOUS EVERY 5 MIN PRN
Status: CANCELLED | OUTPATIENT
Start: 2024-08-31

## 2024-08-31 RX ORDER — CELECOXIB 100 MG/1
200 CAPSULE ORAL DAILY
Status: DISCONTINUED | OUTPATIENT
Start: 2024-08-31 | End: 2024-09-03 | Stop reason: HOSPADM

## 2024-08-31 RX ORDER — ONDANSETRON HYDROCHLORIDE 2 MG/ML
4 INJECTION, SOLUTION INTRAVENOUS EVERY 8 HOURS PRN
Status: DISCONTINUED | OUTPATIENT
Start: 2024-08-31 | End: 2024-09-03 | Stop reason: HOSPADM

## 2024-08-31 RX ORDER — PANTOPRAZOLE SODIUM 40 MG/10ML
40 INJECTION, POWDER, LYOPHILIZED, FOR SOLUTION INTRAVENOUS 2 TIMES DAILY
Status: DISCONTINUED | OUTPATIENT
Start: 2024-08-31 | End: 2024-09-03 | Stop reason: HOSPADM

## 2024-08-31 RX ORDER — MEMANTINE HYDROCHLORIDE 5 MG/1
10 TABLET ORAL 2 TIMES DAILY
Status: DISCONTINUED | OUTPATIENT
Start: 2024-08-31 | End: 2024-09-03 | Stop reason: HOSPADM

## 2024-08-31 RX ORDER — ATORVASTATIN CALCIUM 40 MG/1
40 TABLET, FILM COATED ORAL DAILY
Status: DISCONTINUED | OUTPATIENT
Start: 2024-08-31 | End: 2024-09-03 | Stop reason: HOSPADM

## 2024-08-31 RX ORDER — PANTOPRAZOLE SODIUM 40 MG/10ML
80 INJECTION, POWDER, LYOPHILIZED, FOR SOLUTION INTRAVENOUS ONCE
Status: COMPLETED | OUTPATIENT
Start: 2024-08-31 | End: 2024-08-31

## 2024-08-31 RX ORDER — FENTANYL CITRATE 50 UG/ML
INJECTION, SOLUTION INTRAMUSCULAR; INTRAVENOUS
Status: COMPLETED
Start: 2024-08-31 | End: 2024-08-31

## 2024-08-31 RX ORDER — POLYETHYLENE GLYCOL 3350 17 G/17G
17 POWDER, FOR SOLUTION ORAL DAILY
Status: DISCONTINUED | OUTPATIENT
Start: 2024-08-31 | End: 2024-09-03 | Stop reason: HOSPADM

## 2024-08-31 RX ORDER — OXYCODONE HYDROCHLORIDE 5 MG/1
5 TABLET ORAL EVERY 4 HOURS PRN
Status: CANCELLED | OUTPATIENT
Start: 2024-08-31

## 2024-08-31 RX ORDER — FOLIC ACID 1 MG/1
1 TABLET ORAL DAILY
Status: DISCONTINUED | OUTPATIENT
Start: 2024-08-31 | End: 2024-09-03 | Stop reason: HOSPADM

## 2024-08-31 RX ORDER — CYCLOBENZAPRINE HCL 10 MG
10 TABLET ORAL 3 TIMES DAILY PRN
Status: CANCELLED | OUTPATIENT
Start: 2024-08-31

## 2024-08-31 RX ORDER — DOCUSATE SODIUM 100 MG/1
100 CAPSULE, LIQUID FILLED ORAL 2 TIMES DAILY
Status: CANCELLED | OUTPATIENT
Start: 2024-08-31

## 2024-08-31 RX ORDER — METOPROLOL TARTRATE 25 MG/1
12.5 TABLET, FILM COATED ORAL 2 TIMES DAILY
Status: DISCONTINUED | OUTPATIENT
Start: 2024-08-31 | End: 2024-09-03 | Stop reason: HOSPADM

## 2024-08-31 RX ORDER — CEFAZOLIN SODIUM 2 G/100ML
2 INJECTION, SOLUTION INTRAVENOUS EVERY 8 HOURS
Status: CANCELLED | OUTPATIENT
Start: 2024-08-31 | End: 2024-09-01

## 2024-08-31 RX ORDER — ACETAMINOPHEN 325 MG/1
975 TABLET ORAL EVERY 8 HOURS
Status: CANCELLED | OUTPATIENT
Start: 2024-08-31

## 2024-08-31 RX ORDER — SODIUM CHLORIDE, SODIUM LACTATE, POTASSIUM CHLORIDE, CALCIUM CHLORIDE 600; 310; 30; 20 MG/100ML; MG/100ML; MG/100ML; MG/100ML
100 INJECTION, SOLUTION INTRAVENOUS CONTINUOUS
Status: CANCELLED | OUTPATIENT
Start: 2024-08-31

## 2024-08-31 RX ORDER — OXYCODONE HYDROCHLORIDE 5 MG/1
10 TABLET ORAL EVERY 4 HOURS PRN
Status: CANCELLED | OUTPATIENT
Start: 2024-08-31

## 2024-08-31 SDOH — HEALTH STABILITY: MENTAL HEALTH: HOW OFTEN DO YOU HAVE 6 OR MORE DRINKS ON ONE OCCASION?: NEVER

## 2024-08-31 SDOH — ECONOMIC STABILITY: INCOME INSECURITY: IN THE LAST 12 MONTHS, WAS THERE A TIME WHEN YOU WERE NOT ABLE TO PAY THE MORTGAGE OR RENT ON TIME?: NO

## 2024-08-31 SDOH — SOCIAL STABILITY: SOCIAL INSECURITY: HAVE YOU HAD ANY THOUGHTS OF HARMING ANYONE ELSE?: NO

## 2024-08-31 SDOH — SOCIAL STABILITY: SOCIAL INSECURITY: DO YOU FEEL ANYONE HAS EXPLOITED OR TAKEN ADVANTAGE OF YOU FINANCIALLY OR OF YOUR PERSONAL PROPERTY?: NO

## 2024-08-31 SDOH — SOCIAL STABILITY: SOCIAL INSECURITY: DOES ANYONE TRY TO KEEP YOU FROM HAVING/CONTACTING OTHER FRIENDS OR DOING THINGS OUTSIDE YOUR HOME?: NO

## 2024-08-31 SDOH — SOCIAL STABILITY: SOCIAL INSECURITY: WERE YOU ABLE TO COMPLETE ALL THE BEHAVIORAL HEALTH SCREENINGS?: YES

## 2024-08-31 SDOH — ECONOMIC STABILITY: TRANSPORTATION INSECURITY
IN THE PAST 12 MONTHS, HAS THE LACK OF TRANSPORTATION KEPT YOU FROM MEDICAL APPOINTMENTS OR FROM GETTING MEDICATIONS?: NO

## 2024-08-31 SDOH — ECONOMIC STABILITY: TRANSPORTATION INSECURITY
IN THE PAST 12 MONTHS, HAS LACK OF TRANSPORTATION KEPT YOU FROM MEETINGS, WORK, OR FROM GETTING THINGS NEEDED FOR DAILY LIVING?: NO

## 2024-08-31 SDOH — ECONOMIC STABILITY: HOUSING INSECURITY: AT ANY TIME IN THE PAST 12 MONTHS, WERE YOU HOMELESS OR LIVING IN A SHELTER (INCLUDING NOW)?: NO

## 2024-08-31 SDOH — ECONOMIC STABILITY: HOUSING INSECURITY: IN THE PAST 12 MONTHS, HOW MANY TIMES HAVE YOU MOVED WHERE YOU WERE LIVING?: 1

## 2024-08-31 SDOH — HEALTH STABILITY: MENTAL HEALTH: HOW OFTEN DO YOU HAVE A DRINK CONTAINING ALCOHOL?: NEVER

## 2024-08-31 SDOH — SOCIAL STABILITY: SOCIAL INSECURITY: ARE YOU OR HAVE YOU BEEN THREATENED OR ABUSED PHYSICALLY, EMOTIONALLY, OR SEXUALLY BY ANYONE?: NO

## 2024-08-31 SDOH — SOCIAL STABILITY: SOCIAL INSECURITY: HAS ANYONE EVER THREATENED TO HURT YOUR FAMILY OR YOUR PETS?: NO

## 2024-08-31 SDOH — HEALTH STABILITY: MENTAL HEALTH: HOW MANY STANDARD DRINKS CONTAINING ALCOHOL DO YOU HAVE ON A TYPICAL DAY?: PATIENT DOES NOT DRINK

## 2024-08-31 SDOH — ECONOMIC STABILITY: INCOME INSECURITY: HOW HARD IS IT FOR YOU TO PAY FOR THE VERY BASICS LIKE FOOD, HOUSING, MEDICAL CARE, AND HEATING?: NOT VERY HARD

## 2024-08-31 SDOH — SOCIAL STABILITY: SOCIAL INSECURITY: ARE THERE ANY APPARENT SIGNS OF INJURIES/BEHAVIORS THAT COULD BE RELATED TO ABUSE/NEGLECT?: NO

## 2024-08-31 SDOH — SOCIAL STABILITY: SOCIAL INSECURITY: ABUSE: ADULT

## 2024-08-31 SDOH — SOCIAL STABILITY: SOCIAL INSECURITY: DO YOU FEEL UNSAFE GOING BACK TO THE PLACE WHERE YOU ARE LIVING?: NO

## 2024-08-31 SDOH — SOCIAL STABILITY: SOCIAL INSECURITY: HAVE YOU HAD THOUGHTS OF HARMING ANYONE ELSE?: NO

## 2024-08-31 ASSESSMENT — COGNITIVE AND FUNCTIONAL STATUS - GENERAL
TOILETING: A LOT
MOBILITY SCORE: 18
CLIMB 3 TO 5 STEPS WITH RAILING: A LITTLE
DRESSING REGULAR UPPER BODY CLOTHING: A LITTLE
EATING MEALS: A LITTLE
DAILY ACTIVITIY SCORE: 17
TURNING FROM BACK TO SIDE WHILE IN FLAT BAD: A LITTLE
MOVING TO AND FROM BED TO CHAIR: A LITTLE
PATIENT BASELINE BEDBOUND: YES
STANDING UP FROM CHAIR USING ARMS: A LITTLE
DRESSING REGULAR LOWER BODY CLOTHING: A LITTLE
MOVING FROM LYING ON BACK TO SITTING ON SIDE OF FLAT BED WITH BEDRAILS: A LITTLE
HELP NEEDED FOR BATHING: A LITTLE
WALKING IN HOSPITAL ROOM: A LITTLE
PERSONAL GROOMING: A LITTLE

## 2024-08-31 ASSESSMENT — LIFESTYLE VARIABLES
SKIP TO QUESTIONS 9-10: 1
EVER FELT BAD OR GUILTY ABOUT YOUR DRINKING: NO
TOTAL SCORE: 0
EVER HAD A DRINK FIRST THING IN THE MORNING TO STEADY YOUR NERVES TO GET RID OF A HANGOVER: NO
AUDIT-C TOTAL SCORE: 0
HAVE PEOPLE ANNOYED YOU BY CRITICIZING YOUR DRINKING: NO
HAVE YOU EVER FELT YOU SHOULD CUT DOWN ON YOUR DRINKING: NO

## 2024-08-31 ASSESSMENT — PAIN SCALES - GENERAL
PAINLEVEL_OUTOF10: 5 - MODERATE PAIN
PAINLEVEL_OUTOF10: 9
PAINLEVEL_OUTOF10: 7
PAINLEVEL_OUTOF10: 8
PAINLEVEL_OUTOF10: 9

## 2024-08-31 ASSESSMENT — PAIN - FUNCTIONAL ASSESSMENT
PAIN_FUNCTIONAL_ASSESSMENT: 0-10
PAIN_FUNCTIONAL_ASSESSMENT: 0-10

## 2024-08-31 ASSESSMENT — ACTIVITIES OF DAILY LIVING (ADL)
GROOMING: NEEDS ASSISTANCE
TOILETING: NEEDS ASSISTANCE
DRESSING YOURSELF: NEEDS ASSISTANCE
HEARING - RIGHT EAR: FUNCTIONAL
ADEQUATE_TO_COMPLETE_ADL: YES
JUDGMENT_ADEQUATE_SAFELY_COMPLETE_DAILY_ACTIVITIES: YES
PATIENT'S MEMORY ADEQUATE TO SAFELY COMPLETE DAILY ACTIVITIES?: YES
WALKS IN HOME: NEEDS ASSISTANCE
FEEDING YOURSELF: NEEDS ASSISTANCE
HEARING - LEFT EAR: FUNCTIONAL
BATHING: NEEDS ASSISTANCE

## 2024-08-31 ASSESSMENT — PAIN DESCRIPTION - LOCATION
LOCATION: BACK

## 2024-08-31 ASSESSMENT — PATIENT HEALTH QUESTIONNAIRE - PHQ9
1. LITTLE INTEREST OR PLEASURE IN DOING THINGS: NOT AT ALL
SUM OF ALL RESPONSES TO PHQ9 QUESTIONS 1 & 2: 0
2. FEELING DOWN, DEPRESSED OR HOPELESS: NOT AT ALL

## 2024-08-31 ASSESSMENT — PAIN DESCRIPTION - ORIENTATION
ORIENTATION: LOWER

## 2024-08-31 ASSESSMENT — PAIN DESCRIPTION - ONSET: ONSET: ONGOING

## 2024-08-31 ASSESSMENT — PAIN DESCRIPTION - FREQUENCY: FREQUENCY: CONSTANT/CONTINUOUS

## 2024-08-31 ASSESSMENT — COLUMBIA-SUICIDE SEVERITY RATING SCALE - C-SSRS
6. HAVE YOU EVER DONE ANYTHING, STARTED TO DO ANYTHING, OR PREPARED TO DO ANYTHING TO END YOUR LIFE?: NO
2. HAVE YOU ACTUALLY HAD ANY THOUGHTS OF KILLING YOURSELF?: NO
1. IN THE PAST MONTH, HAVE YOU WISHED YOU WERE DEAD OR WISHED YOU COULD GO TO SLEEP AND NOT WAKE UP?: NO

## 2024-08-31 ASSESSMENT — PAIN DESCRIPTION - PROGRESSION: CLINICAL_PROGRESSION: NOT CHANGED

## 2024-08-31 ASSESSMENT — PAIN DESCRIPTION - PAIN TYPE: TYPE: ACUTE PAIN

## 2024-08-31 ASSESSMENT — PAIN DESCRIPTION - DESCRIPTORS: DESCRIPTORS: ACHING

## 2024-08-31 NOTE — PROGRESS NOTES
Physical Therapy                 Therapy Communication Note    Patient Name: Johanne Acosta  MRN: [id number]  Today's Date: 8/31/2024     Discipline: Physical Therapy    Missed Visit Reason: Missed Visit Reason:  (PT eval attempted x3 this date. Pt pending neurosx consult, receiving blood transfusion and at last attempt had just received IV pain medicine and pt falling asleep, unable to maintain conversation. Nursing aware, will reattempt as able/appropriate.)    Missed Time: Attempt

## 2024-08-31 NOTE — H&P
History Of Present Illness  Johanne Acosta is a 69 y.o. female with a significant past medical/surgical history of previous lumbar fusion, displacement of cervical intervertebral disc, frequent falls, with recent back surgery presents via EMS from a skilled nursing facility for injury secondary to mechanical fall.  The patient reports that she rolled out of bed accidentally this evening.  She states low suspicion for head injury.  She denies any use of anticoagulants.  Denies any loss of consciousness.  She currently rates 7/10 pain in her lumbar back at site of procedure.  Denies pain anywhere else.  Patient was provided with 50 mcg of fentanyl and route by EMS. Patient denies history of anemia. She admits to taking 2 Advil daily for back pain. Denies abdominal pain, black stools.     ED course: Vitals stable.  Imaging of head, spine, hips performed.  Per radiology, mild prevertebral soft tissue edema observed at the cervical spine per radiology.  Per ED provider, case discussed with Dr. Rosas of neurosurgery who evaluated the patient's imaging and suspected the prevertebral edema may have been from the intubation during her recent neurosurgical procedure at [facility] by Dr. Neri.  Patient treated with fentanyl, placed in c-collar, and neurosurgery consulted.     Past Medical History  Past Medical History:   Diagnosis Date    Pain in right shoulder 04/20/2021    Right shoulder pain    Personal history of other diseases of the musculoskeletal system and connective tissue 04/20/2021    History of low back pain    Personal history of other diseases of the musculoskeletal system and connective tissue 01/21/2021    History of low back pain       Surgical History  History reviewed. No pertinent surgical history.     Social History  She reports that she has never smoked. She has never used smokeless tobacco. She reports current alcohol use. She reports that she does not use drugs.    Family History  Family History  "  Problem Relation Name Age of Onset    Alcohol abuse Father      Other (heart problems) Father      Alcohol abuse Maternal Grandfather      Lung cancer Maternal Grandfather          Allergies  Corticosteroids (glucocorticoids), Penicillins, Aspirin, Buspirone, Codeine, Hydrocodone-acetaminophen, Prednisone, and Vancomycin    Review of Systems     Physical Exam  Constitutional:       Comments: Sleeping comfortably in sidelying, easily arousable    Cardiovascular:      Rate and Rhythm: Regular rhythm. Tachycardia present.   Abdominal:      Tenderness: There is no abdominal tenderness.   Musculoskeletal:      Comments: Cervical collar in place, linear lumbar incision without evidence of infection/dehiscence    Skin:     General: Skin is warm and dry.   Neurological:      Mental Status: She is alert. Mental status is at baseline.   Psychiatric:         Behavior: Behavior normal.          Last Recorded Vitals  Blood pressure 139/65, pulse 93, temperature 36.5 °C (97.7 °F), temperature source Temporal, resp. rate 18, height 1.676 m (5' 6\"), weight 84.8 kg (187 lb), SpO2 96%.    Relevant Results    Results for orders placed or performed during the hospital encounter of 08/31/24 (from the past 24 hour(s))   CBC and Auto Differential   Result Value Ref Range    WBC 6.9 4.4 - 11.3 x10*3/uL    nRBC 0.0 0.0 - 0.0 /100 WBCs    RBC 2.24 (L) 4.00 - 5.20 x10*6/uL    Hemoglobin 7.5 (L) 12.0 - 16.0 g/dL    Hematocrit 21.7 (L) 36.0 - 46.0 %    MCV 97 80 - 100 fL    MCH 33.5 26.0 - 34.0 pg    MCHC 34.6 32.0 - 36.0 g/dL    RDW 11.9 11.5 - 14.5 %    Platelets 128 (L) 150 - 450 x10*3/uL    Neutrophils % 82.2 40.0 - 80.0 %    Immature Granulocytes %, Automated 0.6 0.0 - 0.9 %    Lymphocytes % 7.8 13.0 - 44.0 %    Monocytes % 8.6 2.0 - 10.0 %    Eosinophils % 0.4 0.0 - 6.0 %    Basophils % 0.4 0.0 - 2.0 %    Neutrophils Absolute 5.65 1.20 - 7.70 x10*3/uL    Immature Granulocytes Absolute, Automated 0.04 0.00 - 0.70 x10*3/uL    Lymphocytes " Absolute 0.54 (L) 1.20 - 4.80 x10*3/uL    Monocytes Absolute 0.59 0.10 - 1.00 x10*3/uL    Eosinophils Absolute 0.03 0.00 - 0.70 x10*3/uL    Basophils Absolute 0.03 0.00 - 0.10 x10*3/uL   Comprehensive metabolic panel   Result Value Ref Range    Glucose 149 (H) 74 - 99 mg/dL    Sodium 135 (L) 136 - 145 mmol/L    Potassium 3.5 3.5 - 5.3 mmol/L    Chloride 102 98 - 107 mmol/L    Bicarbonate 26 21 - 32 mmol/L    Anion Gap 11 10 - 20 mmol/L    Urea Nitrogen 8 6 - 23 mg/dL    Creatinine 0.54 0.50 - 1.05 mg/dL    eGFR >90 >60 mL/min/1.73m*2    Calcium 8.6 8.6 - 10.3 mg/dL    Albumin 3.4 3.4 - 5.0 g/dL    Alkaline Phosphatase 55 33 - 136 U/L    Total Protein 5.4 (L) 6.4 - 8.2 g/dL    AST 23 9 - 39 U/L    Bilirubin, Total 0.6 0.0 - 1.2 mg/dL    ALT 22 7 - 45 U/L     XR hips bilateral 3 or 4 VW w pelvis when performed    Result Date: 8/31/2024  Interpreted By:  Alicia Mota, STUDY: XR HIPS BILATERAL 3 OR 4 VW WITH PELVIS WHEN PERFORMED;  8/31/2024 3:05 am   INDICATION: Signs/Symptoms:fall.   COMPARISON: Left hip x-ray 07/28/2021   ACCESSION NUMBER(S): [id number]   ORDERING CLINICIAN: ALYX CRAMER   FINDINGS: AP view of the pelvis, AP and frogleg views of the left hip and AP and frogleg views of the right hip were obtained.   There is no acute fracture or dislocation. The pelvic ring appears intact. Moderate degenerative changes are noted at the left hip. Mild degenerative changes at the right hip. There is orthopedic hardware transfixing the lower lumbar spine, the sacrum and the bilateral sacroiliac joints. There are overlying skin staples.  The soft tissues are unremarkable.       1.  No radiographic evidence for acute fracture. Moderate degenerative changes at the left hip. Mild degenerative changes at the right hip.       MACRO: None.   Signed by: Alicia Mota 8/31/2024 3:28 AM Dictation workstation:   [id number]    CT head wo IV contrast    Result Date: 8/31/2024  Interpreted By:  Alicia Mota, STUDY: CT  HEAD WO IV CONTRAST; CT CERVICAL SPINE WO IV CONTRAST; CT THORACIC SPINE WO IV CONTRAST; CT LUMBAR SPINE WO IV CONTRAST;  8/31/2024 2:42 am   INDICATION: Signs/Symptoms:fall.   COMPARISON: CT sinus 03/28/2008. MRI lumbar spine and MRI cervical spine 06/25/2024. CT cervical spine, thoracic spine and lumbar spine 11/02/2015   ACCESSION NUMBER(S): [id number]; [id number]; [id number]; [id number]   ORDERING CLINICIAN: ALYX CRAMER   TECHNIQUE: Axial noncontrast images of the head  with coronal  and sagittal reconstructed images . Axial noncontrast images of the cervical spine, thoracic spine and lumbar spine with coronal and sagittal reconstructed images.   FINDINGS: BRAIN PARENCHYMA: There are periventricular white matter hypodensities, probably representing chronic microvascular ischemic changes. Otherwise, the gray-white matter interfaces are preserved. No acute territorial infarct, mass effect or midline shift. HEMORRHAGE: No acute intracranial hemorrhage. VENTRICLES and EXTRA-AXIAL SPACES: Prominent, consistent with diffuse parenchymal volume loss. No extra-axial fluid collection. EXTRACRANIAL SOFT TISSUES: Within normal limits. CALVARIUM: No depressed calvarial fracture. PARANASAL SINUSES: No air-fluid levels. Small amount of mucus at the left sphenoid sinus. MASTOIDS: Within normal limits.   CERVICAL SPINE: There is diffuse osteopenia. ALIGNMENT: There is straightening of the cervical lordosis. No traumatic facet widening. VERTEBRAE: No acute fracture. DISCS: There is multilevel degenerative disc disease with osteophytosis. Redemonstration of interbody fusion at C6-C7. There is multilevel facet arthropathy. PREVERTEBRAL SOFT TISSUES: Mild prevertebral soft tissue edema at C2-C5. LUNG APICES: No acute findings at the visualized lung apices.     THORACIC SPINE: There is diffuse osteopenia. ALIGNMENT: Within normal limits. VERTEBRAE: No acute fracture. DISCS: There is multilevel degenerative disc disease with  osteophytosis. PREVERTEBRAL SOFT TISSUES: No prevertebral soft tissue swelling. VISUALIZED LUNGS: Motion artifact degrades the evaluation of the visualized lungs. There is mild dependent bilateral atelectasis.   LUMBAR SPINE: There is diffuse osteopenia. There is streak artifact from the orthopedic hardware at the lumbar spine and at the sacrum. ALIGNMENT: Within normal limits. VERTEBRAE: No acute fracture. Postsurgical changes with posterior orthopedic hardware extending through L3, L5, through the sacrum and across the bilateral sacroiliac joints. DISCS: Intervertebral disc spacers at L3-L4, L4-L5 and L5-S1. There is mild degenerative disc disease. PREVERTEBRAL SOFT TISSUES: No prevertebral soft tissue swelling. OTHER: Small amount of gas noted at the paraspinal soft tissues at the lumbar spine with overlying skin staples, probably secondary to recent surgery. The visualized urinary bladder is distended. There is colonic diverticulosis with no acute diverticulitis at the visualized colon.       1. No acute intracranial hemorrhage or depressed calvarial fracture. 2. Mild prevertebral soft tissue edema at the cervical spine. Given the history of trauma, MRI cervical spine can be obtained for further evaluation for acute injury. 3. Otherwise, no acute fracture at the cervical spine, thoracic spine or lumbar spine. Multilevel degenerative changes. Postsurgical changes at the lumbar spine and sacrum.       MACRO: Alicia Mota discussed the significance and urgency of this critical finding by telephone with  ALYX CRAMER on 8/31/2024 at 3:22 am.  (**-RCF-**) Findings:  See findings.   Signed by: Alicia Mota 8/31/2024 3:23 AM Dictation workstation:   [id number]    CT cervical spine wo IV contrast    Result Date: 8/31/2024  Interpreted By:  Alicia Mota, STUDY: CT HEAD WO IV CONTRAST; CT CERVICAL SPINE WO IV CONTRAST; CT THORACIC SPINE WO IV CONTRAST; CT LUMBAR SPINE WO IV CONTRAST;  8/31/2024 2:42 am    INDICATION: Signs/Symptoms:fall.   COMPARISON: CT sinus 03/28/2008. MRI lumbar spine and MRI cervical spine 06/25/2024. CT cervical spine, thoracic spine and lumbar spine 11/02/2015   ACCESSION NUMBER(S): [id number]; [id number]; [id number]; [id number]   ORDERING CLINICIAN: ALYX CRAMER   TECHNIQUE: Axial noncontrast images of the head  with coronal  and sagittal reconstructed images . Axial noncontrast images of the cervical spine, thoracic spine and lumbar spine with coronal and sagittal reconstructed images.   FINDINGS: BRAIN PARENCHYMA: There are periventricular white matter hypodensities, probably representing chronic microvascular ischemic changes. Otherwise, the gray-white matter interfaces are preserved. No acute territorial infarct, mass effect or midline shift. HEMORRHAGE: No acute intracranial hemorrhage. VENTRICLES and EXTRA-AXIAL SPACES: Prominent, consistent with diffuse parenchymal volume loss. No extra-axial fluid collection. EXTRACRANIAL SOFT TISSUES: Within normal limits. CALVARIUM: No depressed calvarial fracture. PARANASAL SINUSES: No air-fluid levels. Small amount of mucus at the left sphenoid sinus. MASTOIDS: Within normal limits.   CERVICAL SPINE: There is diffuse osteopenia. ALIGNMENT: There is straightening of the cervical lordosis. No traumatic facet widening. VERTEBRAE: No acute fracture. DISCS: There is multilevel degenerative disc disease with osteophytosis. Redemonstration of interbody fusion at C6-C7. There is multilevel facet arthropathy. PREVERTEBRAL SOFT TISSUES: Mild prevertebral soft tissue edema at C2-C5. LUNG APICES: No acute findings at the visualized lung apices.     THORACIC SPINE: There is diffuse osteopenia. ALIGNMENT: Within normal limits. VERTEBRAE: No acute fracture. DISCS: There is multilevel degenerative disc disease with osteophytosis. PREVERTEBRAL SOFT TISSUES: No prevertebral soft tissue swelling. VISUALIZED LUNGS: Motion artifact degrades the evaluation of  the visualized lungs. There is mild dependent bilateral atelectasis.   LUMBAR SPINE: There is diffuse osteopenia. There is streak artifact from the orthopedic hardware at the lumbar spine and at the sacrum. ALIGNMENT: Within normal limits. VERTEBRAE: No acute fracture. Postsurgical changes with posterior orthopedic hardware extending through L3, L5, through the sacrum and across the bilateral sacroiliac joints. DISCS: Intervertebral disc spacers at L3-L4, L4-L5 and L5-S1. There is mild degenerative disc disease. PREVERTEBRAL SOFT TISSUES: No prevertebral soft tissue swelling. OTHER: Small amount of gas noted at the paraspinal soft tissues at the lumbar spine with overlying skin staples, probably secondary to recent surgery. The visualized urinary bladder is distended. There is colonic diverticulosis with no acute diverticulitis at the visualized colon.       1. No acute intracranial hemorrhage or depressed calvarial fracture. 2. Mild prevertebral soft tissue edema at the cervical spine. Given the history of trauma, MRI cervical spine can be obtained for further evaluation for acute injury. 3. Otherwise, no acute fracture at the cervical spine, thoracic spine or lumbar spine. Multilevel degenerative changes. Postsurgical changes at the lumbar spine and sacrum.       MACRO: Alicia Mota discussed the significance and urgency of this critical finding by telephone with  ALYX CRAMER on 8/31/2024 at 3:22 am.  (**-RCF-**) Findings:  See findings.   Signed by: Alicia Mota 8/31/2024 3:23 AM Dictation workstation:   [id number]    CT thoracic spine wo IV contrast    Result Date: 8/31/2024  Interpreted By:  Alicia Mota, STUDY: CT HEAD WO IV CONTRAST; CT CERVICAL SPINE WO IV CONTRAST; CT THORACIC SPINE WO IV CONTRAST; CT LUMBAR SPINE WO IV CONTRAST;  8/31/2024 2:42 am   INDICATION: Signs/Symptoms:fall.   COMPARISON: CT sinus 03/28/2008. MRI lumbar spine and MRI cervical spine 06/25/2024. CT cervical spine,  thoracic spine and lumbar spine 11/02/2015   ACCESSION NUMBER(S): [id number]; [id number]; [id number]; [id number]   ORDERING CLINICIAN: ALYX CRAMER   TECHNIQUE: Axial noncontrast images of the head  with coronal  and sagittal reconstructed images . Axial noncontrast images of the cervical spine, thoracic spine and lumbar spine with coronal and sagittal reconstructed images.   FINDINGS: BRAIN PARENCHYMA: There are periventricular white matter hypodensities, probably representing chronic microvascular ischemic changes. Otherwise, the gray-white matter interfaces are preserved. No acute territorial infarct, mass effect or midline shift. HEMORRHAGE: No acute intracranial hemorrhage. VENTRICLES and EXTRA-AXIAL SPACES: Prominent, consistent with diffuse parenchymal volume loss. No extra-axial fluid collection. EXTRACRANIAL SOFT TISSUES: Within normal limits. CALVARIUM: No depressed calvarial fracture. PARANASAL SINUSES: No air-fluid levels. Small amount of mucus at the left sphenoid sinus. MASTOIDS: Within normal limits.   CERVICAL SPINE: There is diffuse osteopenia. ALIGNMENT: There is straightening of the cervical lordosis. No traumatic facet widening. VERTEBRAE: No acute fracture. DISCS: There is multilevel degenerative disc disease with osteophytosis. Redemonstration of interbody fusion at C6-C7. There is multilevel facet arthropathy. PREVERTEBRAL SOFT TISSUES: Mild prevertebral soft tissue edema at C2-C5. LUNG APICES: No acute findings at the visualized lung apices.     THORACIC SPINE: There is diffuse osteopenia. ALIGNMENT: Within normal limits. VERTEBRAE: No acute fracture. DISCS: There is multilevel degenerative disc disease with osteophytosis. PREVERTEBRAL SOFT TISSUES: No prevertebral soft tissue swelling. VISUALIZED LUNGS: Motion artifact degrades the evaluation of the visualized lungs. There is mild dependent bilateral atelectasis.   LUMBAR SPINE: There is diffuse osteopenia. There is streak artifact  from the orthopedic hardware at the lumbar spine and at the sacrum. ALIGNMENT: Within normal limits. VERTEBRAE: No acute fracture. Postsurgical changes with posterior orthopedic hardware extending through L3, L5, through the sacrum and across the bilateral sacroiliac joints. DISCS: Intervertebral disc spacers at L3-L4, L4-L5 and L5-S1. There is mild degenerative disc disease. PREVERTEBRAL SOFT TISSUES: No prevertebral soft tissue swelling. OTHER: Small amount of gas noted at the paraspinal soft tissues at the lumbar spine with overlying skin staples, probably secondary to recent surgery. The visualized urinary bladder is distended. There is colonic diverticulosis with no acute diverticulitis at the visualized colon.       1. No acute intracranial hemorrhage or depressed calvarial fracture. 2. Mild prevertebral soft tissue edema at the cervical spine. Given the history of trauma, MRI cervical spine can be obtained for further evaluation for acute injury. 3. Otherwise, no acute fracture at the cervical spine, thoracic spine or lumbar spine. Multilevel degenerative changes. Postsurgical changes at the lumbar spine and sacrum.       MACRO: Alicia Mota discussed the significance and urgency of this critical finding by telephone with  ALYX CRAMER on 8/31/2024 at 3:22 am.  (**-RCF-**) Findings:  See findings.   Signed by: Alicia Mota 8/31/2024 3:23 AM Dictation workstation:   [id number]    CT lumbar spine wo IV contrast    Result Date: 8/31/2024  Interpreted By:  Alicia Mota, STUDY: CT HEAD WO IV CONTRAST; CT CERVICAL SPINE WO IV CONTRAST; CT THORACIC SPINE WO IV CONTRAST; CT LUMBAR SPINE WO IV CONTRAST;  8/31/2024 2:42 am   INDICATION: Signs/Symptoms:fall.   COMPARISON: CT sinus 03/28/2008. MRI lumbar spine and MRI cervical spine 06/25/2024. CT cervical spine, thoracic spine and lumbar spine 11/02/2015   ACCESSION NUMBER(S): [id number]; [id number]; [id number]; [id number]   ORDERING CLINICIAN:  ALYX CRAMER   TECHNIQUE: Axial noncontrast images of the head  with coronal  and sagittal reconstructed images . Axial noncontrast images of the cervical spine, thoracic spine and lumbar spine with coronal and sagittal reconstructed images.   FINDINGS: BRAIN PARENCHYMA: There are periventricular white matter hypodensities, probably representing chronic microvascular ischemic changes. Otherwise, the gray-white matter interfaces are preserved. No acute territorial infarct, mass effect or midline shift. HEMORRHAGE: No acute intracranial hemorrhage. VENTRICLES and EXTRA-AXIAL SPACES: Prominent, consistent with diffuse parenchymal volume loss. No extra-axial fluid collection. EXTRACRANIAL SOFT TISSUES: Within normal limits. CALVARIUM: No depressed calvarial fracture. PARANASAL SINUSES: No air-fluid levels. Small amount of mucus at the left sphenoid sinus. MASTOIDS: Within normal limits.   CERVICAL SPINE: There is diffuse osteopenia. ALIGNMENT: There is straightening of the cervical lordosis. No traumatic facet widening. VERTEBRAE: No acute fracture. DISCS: There is multilevel degenerative disc disease with osteophytosis. Redemonstration of interbody fusion at C6-C7. There is multilevel facet arthropathy. PREVERTEBRAL SOFT TISSUES: Mild prevertebral soft tissue edema at C2-C5. LUNG APICES: No acute findings at the visualized lung apices.     THORACIC SPINE: There is diffuse osteopenia. ALIGNMENT: Within normal limits. VERTEBRAE: No acute fracture. DISCS: There is multilevel degenerative disc disease with osteophytosis. PREVERTEBRAL SOFT TISSUES: No prevertebral soft tissue swelling. VISUALIZED LUNGS: Motion artifact degrades the evaluation of the visualized lungs. There is mild dependent bilateral atelectasis.   LUMBAR SPINE: There is diffuse osteopenia. There is streak artifact from the orthopedic hardware at the lumbar spine and at the sacrum. ALIGNMENT: Within normal limits. VERTEBRAE: No acute fracture. Postsurgical  changes with posterior orthopedic hardware extending through L3, L5, through the sacrum and across the bilateral sacroiliac joints. DISCS: Intervertebral disc spacers at L3-L4, L4-L5 and L5-S1. There is mild degenerative disc disease. PREVERTEBRAL SOFT TISSUES: No prevertebral soft tissue swelling. OTHER: Small amount of gas noted at the paraspinal soft tissues at the lumbar spine with overlying skin staples, probably secondary to recent surgery. The visualized urinary bladder is distended. There is colonic diverticulosis with no acute diverticulitis at the visualized colon.       1. No acute intracranial hemorrhage or depressed calvarial fracture. 2. Mild prevertebral soft tissue edema at the cervical spine. Given the history of trauma, MRI cervical spine can be obtained for further evaluation for acute injury. 3. Otherwise, no acute fracture at the cervical spine, thoracic spine or lumbar spine. Multilevel degenerative changes. Postsurgical changes at the lumbar spine and sacrum.       MACRO: Alicia Mota discussed the significance and urgency of this critical finding by telephone with  ALYX CRAMER on 8/31/2024 at 3:22 am.  (**-RCF-**) Findings:  See findings.   Signed by: Alicia Mota 8/31/2024 3:23 AM Dictation workstation:   [id number]        Assessment/Plan   Assessment & Plan  Lower back pain    #Fall  #Prevertebral cervical edema per radiologist  #Hyponatremia        Addendum patient seen and evaluated on neurosurgical problem agree with the above management allow neurosurgery and ENT to make the recommendations and evaluations I will follow this patient and then discharge at the appropriate time thank you  #Anemia, hemoglobin 7.5  -DNR CCA   - Neurosurgery consulted in ED, appreciate recs  - Maintain cervical collar  - Continue pain meds as needed  - Vitals every 8 hours  - Trend daily labs  - Fall precautions  - PT/OT  - IV fluids  - Patient without documented history of anemia, current  hemoglobin of 7.5 down from 13.8 on 6/24.  Will get CT chest abdomen pelvis and stool occult sample  -will order 1 unit of blood and start daily protonix   -Patient educated on refraining from NSAIDs if able to prevent GI bleed     #DVT PPx  - hold blood thinners due to suspected bleed  - SCD    Chronic conditions  #Depression  #Hyperlipidemia  #Hypertension  - Continue home meds when reconciled, low-salt/fat diet       I spent 75 minutes in the professional and overall care of this patient.      OZ JimenezC

## 2024-08-31 NOTE — CARE PLAN
The patient's goals for the shift include      The clinical goals for the shift include pain management      Problem: Pain - Adult  Goal: Verbalizes/displays adequate comfort level or baseline comfort level  8/31/2024 1243 by Blanka Reich RN  Outcome: Progressing  8/31/2024 1243 by Blanka Reich RN  Outcome: Progressing     Problem: Safety - Adult  Goal: Free from fall injury  8/31/2024 1243 by Blanka Reich RN  Outcome: Progressing  8/31/2024 1243 by Blanka Reich RN  Outcome: Progressing     Problem: Discharge Planning  Goal: Discharge to home or other facility with appropriate resources  8/31/2024 1243 by Blanka Reich RN  Outcome: Progressing  8/31/2024 1243 by Blanka Reich RN  Outcome: Progressing     Problem: Chronic Conditions and Co-morbidities  Goal: Patient's chronic conditions and co-morbidity symptoms are monitored and maintained or improved  8/31/2024 1243 by Blanka Reich RN  Outcome: Progressing  8/31/2024 1243 by Blanka Reich RN  Outcome: Progressing     Problem: Skin  Goal: Decreased wound size/increased tissue granulation at next dressing change  8/31/2024 1243 by Blanka Reich RN  Outcome: Progressing  Flowsheets (Taken 8/31/2024 1243)  Decreased wound size/increased tissue granulation at next dressing change: Promote sleep for wound healing  8/31/2024 1243 by Blanka Reich RN  Outcome: Progressing  Flowsheets (Taken 8/31/2024 1243)  Decreased wound size/increased tissue granulation at next dressing change: Promote sleep for wound healing  Goal: Prevent/minimize sheer/friction injuries  8/31/2024 1243 by Blanka Reich RN  Outcome: Progressing  Flowsheets (Taken 8/31/2024 1243)  Prevent/minimize sheer/friction injuries: Turn/reposition every 2 hours/use positioning/transfer devices  8/31/2024 1243 by Blanka Reich RN  Outcome: Progressing  Flowsheets (Taken 8/31/2024 1243)  Prevent/minimize sheer/friction injuries: Turn/reposition every 2 hours/use positioning/transfer devices  Goal: Promote  skin healing  8/31/2024 1243 by Blanka Reich RN  Outcome: Progressing  Flowsheets (Taken 8/31/2024 1243)  Promote skin healing: Turn/reposition every 2 hours/use positioning/transfer devices  8/31/2024 1243 by Blanka Reich RN  Outcome: Progressing  Flowsheets (Taken 8/31/2024 1243)  Promote skin healing: Turn/reposition every 2 hours/use positioning/transfer devices

## 2024-08-31 NOTE — ED NOTES
Pt made it through radiology with extra fentanyl for pain   Pt now sleeping on her left side for comfort - Pt pain is controlled   Placed on 3L NC to prevent hypoxia while sleeping      Rasheed Aranda RN  08/31/24 1614

## 2024-08-31 NOTE — ED PROVIDER NOTES
HPI   Chief Complaint   Patient presents with    Fall     Lumbar fusion yesterday - rolled out of bed   Back pain increased        69-year-old female with a significant past medical/surgical history of previous lumbar fusion, frequent falls, with recent back surgery presents via EMS from a skilled nursing facility for injury secondary to mechanical fall.  The patient reports that she rolled out of bed accidentally this evening.  She states low suspicion for head injury.  She denies any use of anticoagulants.  Denies any loss of consciousness.  She states discomfort about her lower back.  She reports that this pain was present prior to her falling.  Denies any pain or injury to her extremities.  Patient was provided with 50 mcg of fentanyl and route by EMS              Patient History   Past Medical History:   Diagnosis Date    Pain in right shoulder 04/20/2021    Right shoulder pain    Personal history of other diseases of the musculoskeletal system and connective tissue 04/20/2021    History of low back pain    Personal history of other diseases of the musculoskeletal system and connective tissue 01/21/2021    History of low back pain     History reviewed. No pertinent surgical history.  Family History   Problem Relation Name Age of Onset    Alcohol abuse Father      Other (heart problems) Father      Alcohol abuse Maternal Grandfather      Lung cancer Maternal Grandfather       Social History     Tobacco Use    Smoking status: Never    Smokeless tobacco: Never   Vaping Use    Vaping status: Never Used   Substance Use Topics    Alcohol use: Yes     Comment: Rarely    Drug use: Never       Physical Exam   ED Triage Vitals [08/31/24 0111]   Temperature Heart Rate Respirations BP   36.5 °C (97.7 °F) 97 17 145/64      Pulse Ox Temp Source Heart Rate Source Patient Position   (!) 84 % Temporal Monitor Lying      BP Location FiO2 (%)     Right arm --       Physical Exam  Vitals and nursing note reviewed.    Constitutional:       General: She is not in acute distress.     Appearance: Normal appearance. She is normal weight. She is not ill-appearing, toxic-appearing or diaphoretic.   HENT:      Head: Normocephalic.      Nose: Nose normal.      Mouth/Throat:      Mouth: Mucous membranes are moist.   Eyes:      Extraocular Movements: Extraocular movements intact.      Conjunctiva/sclera: Conjunctivae normal.   Neck:      Comments: Tenderness to the midline and paraspinal planes throughout the cervical thoracic and lumbar spine  Cardiovascular:      Rate and Rhythm: Normal rate and regular rhythm.      Pulses: Normal pulses.   Pulmonary:      Effort: Pulmonary effort is normal. No respiratory distress.      Breath sounds: Normal breath sounds.   Chest:      Chest wall: No tenderness.   Abdominal:      General: Abdomen is flat. There is no distension.      Palpations: Abdomen is soft.      Tenderness: There is no abdominal tenderness. There is no guarding or rebound.   Musculoskeletal:         General: Normal range of motion.      Cervical back: Normal range of motion and neck supple.      Comments: No direct bony tenderness on evaluation of the extremities.  Patient has full sensation throughout the bilateral upper extremities.  She has no obvious weakness.  She has equal  strength.   Skin:     General: Skin is warm and dry.      Capillary Refill: Capillary refill takes less than 2 seconds.      Comments: There is a linear vertical surgical incision site to the midline lumbar spine well-approximated with staples   Neurological:      General: No focal deficit present.      Mental Status: She is alert and oriented to person, place, and time.   Psychiatric:         Mood and Affect: Mood normal.         Behavior: Behavior normal.         Thought Content: Thought content normal.         Judgment: Judgment normal.           ED Course & MDM   Diagnoses as of 08/31/24 0341   Fall, initial encounter                 No data  recorded     [place] Coma Scale Score: 14 (08/31/24 0112 : Rasheed Aranda RN)                           Medical Decision Making  69-year-old female seen and evaluated for a mechanical fall.  Patient reported that she rolled out of bed this morning.  Unfortunately the patient was unsure if she struck her head however she reported discomfort about her head neck and back.  Patient was recently admitted at [facility] and had a neurosurgical procedure done by Dr. Enriquez.  Patient was found to have a vertical linear surgical incision site at the lumbar spine region.  The incision itself is without dehiscence or surrounding erythema.  It is held together by surgical staples.  CT imaging was obtained.  I spoke with the radiologist in regard to the imaging studies.  There was some concern that the patient had prevertebral edema about the cervical spine and MRI was recommended to further delineate.  Spoke with the patient again in regard to the results.  The patient stated that she always has neck and back discomfort.  Is difficult to delineate whether or not the pain is acute or chronic at this point.  Findings were discussed with neurosurgery on-call Dr. Rosas who personally evaluated the patient's imaging studies.  Dr. Rosas knows the patient from previous encounters.  He suspected that the prevertebral edema may have been from the intubation itself.  Shared decision making was incorporated it was decided by all parties that the patient would most benefit from admission and reassessment.  Patient was transition to an [place] [employer] collar and will be admitted to the medicine service.        Procedure  Procedures     Vazquez Granados PA-C  08/31/24 0349       Vazquez Granados PA-C  08/31/24 0349

## 2024-08-31 NOTE — PROGRESS NOTES
Occupational Therapy                 Therapy Communication Note    Patient Name: Johanne Acosta  MRN: [id number]  Today's Date: 8/31/2024     Discipline: Occupational Therapy    Missed Visit Reason: Missed Visit Reason:  (multiple attempts to see pt at this date however unable to see pt d/t the following: pending neuro surg consult, pt receiving blood transfusion, and pt very lethargic / falling asleep during attempted eval; NSG aware; Will re-attempt as schedule permits)    Missed Time: Attempt

## 2024-08-31 NOTE — ED NOTES
Pt placed in an aspen per neurosurgery request  Placed on the pure wick for comfort   Pt awaiting bed assignment - provided about 3 oz of OJ      Rasheed Aranda RN  08/31/24 2531

## 2024-08-31 NOTE — CONSULTS
Reason For Consult  The patient had a recent lumbar operation at [facility] with extension of her fusion up to L3 and reinforced down to the sacrum earlier this week.  She was discharged from the hospital yesterday and fell out of bed last evening and was brought to the emergency room at [facility].    History Of Present Illness  Johanne Acosta is a 69 y.o. female presenting with history of recent lumbar surgery performed by my partner Dr. Jer Neri at [facility] earlier this week.  She was discharged from the hospital yesterday afternoon to a skilled nursing facility.  Last night she fell out of bed and hit the back of her head and complained of neck pain and swallowing difficulty.  They scanned her brain and spine with a CAT scan.  The construct looks stable.  The thoracic and cervical spine do not show any fracture or dislocation.  The cervical spine shows the original fusion from decades ago but acutely it shows some retropharyngeal edema and what looks like possibly some trauma from the intubation from her surgery.  I was the surgeon who did some of her lumbar fusions earlier back in 2016 and my last office visit note stated that if she needed further surgery she would be referred onto my colleagues.  She had the surgery last week because her symptoms became worse after she was evicted from her place of residence.  This morning she is complaining of pain in her back that is worse than the pain she had the surgery for but not different from the pain she woke up from surgery with.  She is having some difficulty lying on her back because of discomfort.  She also has 2 types of pain in her neck region she feels it is stiff and uncomfortable and she has a dry throat that she feels that she needs to constantly be drinking to soothe the irritation in the back of her throat.  She is not having any difficulty with her arms as far as pain or tingling or dysfunction.  She is having  "left eye irritation and scratchiness.     Past Medical History  She has a past medical history of Pain in right shoulder (04/20/2021), Personal history of other diseases of the musculoskeletal system and connective tissue (04/20/2021), and Personal history of other diseases of the musculoskeletal system and connective tissue (01/21/2021).    Surgical History  She has no past surgical history on file.     Social History  She reports that she has never smoked. She has never used smokeless tobacco. She reports current alcohol use. She reports that she does not use drugs.    Family History  Family History   Problem Relation Name Age of Onset    Alcohol abuse Father      Other (heart problems) Father      Alcohol abuse Maternal Grandfather      Lung cancer Maternal Grandfather          Allergies  Corticosteroids (glucocorticoids), Penicillins, Aspirin, Buspirone, Codeine, Hydrocodone-acetaminophen, Prednisone, and Vancomycin    Review of Systems  ROS: 14/14 systems negative other than listed in HPI     Physical Exam  General: NAD, AOx 3,  no aphasia or dysarthria, normal fund of knowledge, she has halting communication,   Cranial Nerves II-XII: VFF, PERRL, EOMI, Face asymmetry with the left eye being swollen slightly swollen and narrow, Facial SILT, Palate/Tongue midline and symmetric, the back of her throat including the uvula is erythematous and it looks like it is very small vesicles on the surface of the posterior pharyngeal wall  Motor: 5/5 Throughout all extremities,  Sensation: SILT and PP throughout all extremities       Last Recorded Vitals  Blood pressure 148/68, pulse 86, temperature 36.5 °C (97.7 °F), temperature source Temporal, resp. rate 17, height 1.676 m (5' 6\"), weight 84.8 kg (187 lb), SpO2 100%.    Relevant Results  The CAT scan of the C-spine shows the edema in the retropharyngeal space.  It shows the solid fusion from decades ago but no sign of any change since her MRI of the cervical spine with the " exception of the retropharyngeal swelling  The lumbar CAT scan shows the alignment of the screws and implants in proper position.     Assessment/Plan     Recent episode of falling out of bed with some neck strain and overall body aches.  I believe she would benefit from more physical therapy than she is going to get at the skilled nursing facility and so we should assess her to see if she is a candidate for the rehab hospital.  I also am concerned about the retropharyngeal swelling and I think that otolaryngology should be called him to look in the back of her throat and possibly compare what they see directly with what is apparent on the CAT scan.  It is possible that it was traumatized with intubation and might benefit from some antibiotics but I will leave that up to their assessment.  I also think that since she has not very mobile when she is in bed she should have pneumatic compression stockings on.    I spent 45 minutes in the professional and overall care of this patient.      Hernandez Rosas MD

## 2024-08-31 NOTE — PROGRESS NOTES
08/31/24 0934   Discharge Planning   Living Arrangements Other (Comment)   Support Systems Children   Assistance Needed Assist with ADL's   Type of Residence Skilled nursing facility   Home or Post Acute Services Post acute facilities (Rehab/SNF/etc)   Expected Discharge Disposition SNF   Does the patient need discharge transport arranged? Yes   RoundTrip coordination needed? Yes   Has discharge transport been arranged? No   Patient Choice   Provider Choice list and CMS website (https://medicare.gov/care-compare#search) for post-acute Quality and Resource Measure Data were provided and reviewed with: Patient  (8/31: SNF list provided)     Met with patient at bedside, introduced self and role on care transitions team. Admission assessment completed with patient. Patient states prior to admission she was at [facility] for skilled nursing. Patient states her preference is to not return there at discharge. [facility] of choice explained and SNF list provided to patient. Insurance and emergency contact information verified with patient.   PCP: Dr. Aislinn [facility]

## 2024-08-31 NOTE — ED NOTES
Attempted CT - pt could not lay on her back supine  Pt medicated for pain - will attempt later      Rasheed Aranda RN  08/31/24 1875

## 2024-09-01 VITALS
HEART RATE: 91 BPM | RESPIRATION RATE: 18 BRPM | OXYGEN SATURATION: 94 % | SYSTOLIC BLOOD PRESSURE: 138 MMHG | TEMPERATURE: 98.6 F | HEIGHT: 66 IN | DIASTOLIC BLOOD PRESSURE: 76 MMHG | WEIGHT: 187 LBS | BODY MASS INDEX: 30.05 KG/M2

## 2024-09-01 LAB
ANION GAP SERPL CALC-SCNC: 11 MMOL/L (ref 10–20)
BUN SERPL-MCNC: 12 MG/DL (ref 6–23)
CALCIUM SERPL-MCNC: 8.3 MG/DL (ref 8.6–10.3)
CHLORIDE SERPL-SCNC: 103 MMOL/L (ref 98–107)
CO2 SERPL-SCNC: 25 MMOL/L (ref 21–32)
CREAT SERPL-MCNC: 0.64 MG/DL (ref 0.5–1.05)
EGFRCR SERPLBLD CKD-EPI 2021: >90 ML/MIN/1.73M*2
ERYTHROCYTE [DISTWIDTH] IN BLOOD BY AUTOMATED COUNT: 12.8 % (ref 11.5–14.5)
GLUCOSE SERPL-MCNC: 147 MG/DL (ref 74–99)
HCT VFR BLD AUTO: 25.8 % (ref 36–46)
HGB BLD-MCNC: 8.9 G/DL (ref 12–16)
MCH RBC QN AUTO: 32.5 PG (ref 26–34)
MCHC RBC AUTO-ENTMCNC: 34.5 G/DL (ref 32–36)
MCV RBC AUTO: 94 FL (ref 80–100)
NRBC BLD-RTO: 0 /100 WBCS (ref 0–0)
PLATELET # BLD AUTO: 165 X10*3/UL (ref 150–450)
POTASSIUM SERPL-SCNC: 3.1 MMOL/L (ref 3.5–5.3)
RBC # BLD AUTO: 2.74 X10*6/UL (ref 4–5.2)
SODIUM SERPL-SCNC: 136 MMOL/L (ref 136–145)
WBC # BLD AUTO: 5.9 X10*3/UL (ref 4.4–11.3)

## 2024-09-01 PROCEDURE — 36415 COLL VENOUS BLD VENIPUNCTURE: CPT | Performed by: INTERNAL MEDICINE

## 2024-09-01 PROCEDURE — 97165 OT EVAL LOW COMPLEX 30 MIN: CPT | Mod: GO

## 2024-09-01 PROCEDURE — 2500000001 HC RX 250 WO HCPCS SELF ADMINISTERED DRUGS (ALT 637 FOR MEDICARE OP): Performed by: PHYSICIAN ASSISTANT

## 2024-09-01 PROCEDURE — 2500000004 HC RX 250 GENERAL PHARMACY W/ HCPCS (ALT 636 FOR OP/ED)

## 2024-09-01 PROCEDURE — 85027 COMPLETE CBC AUTOMATED: CPT | Performed by: INTERNAL MEDICINE

## 2024-09-01 PROCEDURE — 99231 SBSQ HOSP IP/OBS SF/LOW 25: CPT | Performed by: NEUROLOGICAL SURGERY

## 2024-09-01 PROCEDURE — 96376 TX/PRO/DX INJ SAME DRUG ADON: CPT

## 2024-09-01 PROCEDURE — 2500000001 HC RX 250 WO HCPCS SELF ADMINISTERED DRUGS (ALT 637 FOR MEDICARE OP): Performed by: NURSE PRACTITIONER

## 2024-09-01 PROCEDURE — G0378 HOSPITAL OBSERVATION PER HR: HCPCS

## 2024-09-01 PROCEDURE — 80048 BASIC METABOLIC PNL TOTAL CA: CPT | Performed by: INTERNAL MEDICINE

## 2024-09-01 PROCEDURE — 97162 PT EVAL MOD COMPLEX 30 MIN: CPT | Mod: GP

## 2024-09-01 RX ORDER — OXYCODONE AND ACETAMINOPHEN 5; 325 MG/1; MG/1
1 TABLET ORAL EVERY 6 HOURS PRN
Status: DISCONTINUED | OUTPATIENT
Start: 2024-09-01 | End: 2024-09-03 | Stop reason: HOSPADM

## 2024-09-01 RX ORDER — OXYCODONE AND ACETAMINOPHEN 5; 325 MG/1; MG/1
2 TABLET ORAL EVERY 6 HOURS PRN
Status: DISCONTINUED | OUTPATIENT
Start: 2024-09-01 | End: 2024-09-03 | Stop reason: HOSPADM

## 2024-09-01 ASSESSMENT — PAIN DESCRIPTION - LOCATION
LOCATION: BACK
LOCATION: BACK

## 2024-09-01 ASSESSMENT — COGNITIVE AND FUNCTIONAL STATUS - GENERAL
WALKING IN HOSPITAL ROOM: TOTAL
TOILETING: TOTAL
MOBILITY SCORE: 10
CLIMB 3 TO 5 STEPS WITH RAILING: TOTAL
DRESSING REGULAR LOWER BODY CLOTHING: TOTAL
PERSONAL GROOMING: A LITTLE
MOVING TO AND FROM BED TO CHAIR: A LOT
DAILY ACTIVITIY SCORE: 12
MOVING FROM LYING ON BACK TO SITTING ON SIDE OF FLAT BED WITH BEDRAILS: A LOT
EATING MEALS: A LITTLE
STANDING UP FROM CHAIR USING ARMS: A LOT
DRESSING REGULAR UPPER BODY CLOTHING: A LOT
TURNING FROM BACK TO SIDE WHILE IN FLAT BAD: A LOT
HELP NEEDED FOR BATHING: A LOT

## 2024-09-01 ASSESSMENT — PAIN - FUNCTIONAL ASSESSMENT
PAIN_FUNCTIONAL_ASSESSMENT: 0-10

## 2024-09-01 ASSESSMENT — PAIN DESCRIPTION - ORIENTATION
ORIENTATION: LOWER
ORIENTATION: LOWER

## 2024-09-01 ASSESSMENT — PAIN SCALES - GENERAL
PAINLEVEL_OUTOF10: 5 - MODERATE PAIN
PAINLEVEL_OUTOF10: 8
PAINLEVEL_OUTOF10: 7
PAINLEVEL_OUTOF10: 8
PAINLEVEL_OUTOF10: 5 - MODERATE PAIN
PAINLEVEL_OUTOF10: 8
PAINLEVEL_OUTOF10: 6

## 2024-09-01 ASSESSMENT — PAIN SCALES - PAIN ASSESSMENT IN ADVANCED DEMENTIA (PAINAD): NEGVOCALIZATION: REPEATED TROUBLED CALLING OUT, LOUD MOANING/GROANING, CRYING

## 2024-09-01 ASSESSMENT — ACTIVITIES OF DAILY LIVING (ADL): BATHING_ASSISTANCE: MAXIMAL

## 2024-09-01 NOTE — CARE PLAN
The patient's goals for the shift include      The clinical goals for the shift include pt wiill remain safe and comftorable for the remainder of the shift      Problem: Pain - Adult  Goal: Verbalizes/displays adequate comfort level or baseline comfort level  Outcome: Progressing     Problem: Safety - Adult  Goal: Free from fall injury  Outcome: Progressing     Problem: Discharge Planning  Goal: Discharge to home or other facility with appropriate resources  Outcome: Progressing     Problem: Chronic Conditions and Co-morbidities  Goal: Patient's chronic conditions and co-morbidity symptoms are monitored and maintained or improved  Outcome: Progressing     Problem: Skin  Goal: Decreased wound size/increased tissue granulation at next dressing change  Outcome: Progressing  Flowsheets (Taken 9/1/2024 1120)  Decreased wound size/increased tissue granulation at next dressing change: Promote sleep for wound healing  Goal: Prevent/minimize sheer/friction injuries  Outcome: Progressing  Flowsheets (Taken 9/1/2024 1120)  Prevent/minimize sheer/friction injuries: Turn/reposition every 2 hours/use positioning/transfer devices  Goal: Promote skin healing  Outcome: Progressing  Flowsheets (Taken 9/1/2024 1120)  Promote skin healing: Assess skin/pad under line(s)/device(s)

## 2024-09-01 NOTE — PROGRESS NOTES
Johanne Acosta is a 69 y.o. female on day 0 of admission presenting with Lower back pain.    Subjective   The patient is a little teary-eyed this morning.  When I walked in she was sitting at the side of the bed.  She wanted to attempt using the bedside commode instead of the bedpan.  I personally along with one of the nurses attempted to stand the patient with the intent of pivoting her to the commode, but her legs are just not holding up her weight.  They seem to be full strength but she will not straighten her knees out nor her hips.  We put her back in bed and told her that she needs more work with the therapist before we let her start to use the commode.  Her #1 complaint is the back pain.  She is also having trouble with intermittent dropping her oxygenation.  She is aware that she is emotionally labile right now and she does not recall having this much pain after her lumbar fusion back in 2016.  I reminded her that she did have a fair bit of pain after that operation.  She does feel that the Dilaudid is too strong and makes her emotionally weepy.  She is asking if we can switch her to Percocet.  I think 1 Percocet every 4 hours would be a reasonable method of keeping her pain in control.  A second Percocet if the pain is excruciating every 6 hours.       Objective : Her hemoglobin is down to 8.9 and I can see that her pulse had gone above the 100 several times during the night.  I am going to ask her primary service to consider a transfusion of 1 unit of packed cells.  I think it might help her strength, her oxygenation, and her healing.    Physical Exam: General: NAD, AOx 3,  no aphasia or dysarthria,   Cranial Nerves II-XII: VFF, PERRL, EOMI, Face Symm, Facial SILT, Palate/Tongue midline and symmetric  Motor: 5/5 Throughout all extremities,  No drift, no dysmetria on finger to nose  Sensation: SILT and PP throughout all extremities  Although strength was present in all motor groups she still would not  "straighten her knees at her hips after transferring all the way to her legs when we attempted to stand her.  As a result we had her sit back down.  The incision is dry and intact no erythema and it is open to air but I think a very light dressing that prevents rubbing on the wound directly would be helpful.    Last Recorded Vitals  Blood pressure 133/68, pulse 86, temperature 36.4 °C (97.5 °F), temperature source Temporal, resp. rate 18, height 1.676 m (5' 6\"), weight 84.8 kg (187 lb), SpO2 91%.  Intake/Output last 3 Shifts:  I/O last 3 completed shifts:  In: 2265.2 (26.7 mL/kg) [P.O.:711; I.V.:1006.7 (11.9 mL/kg); Blood:547.5]  Out: 1050 (12.4 mL/kg) [Urine:1050 (0.3 mL/kg/hr)]  Weight: 84.8 kg     Relevant Results                              Assessment/Plan   Assessment & Plan  Lower back pain    69-year-old female now with a revision surgery for fusion from L3 to the sacrum.  She had a recent fall out of bed and so we need to make certain that she does not fall here in the hospital.  I believe she would benefit from having transfusion and adjusting her pain medication.  I believe she is an excellent rehabilitation candidate and so I would ask physical therapy to do a complete assessment with that in mind.  Her cervical pain is not present today.  I took off her collar and examined her neck.  I told her she can put it back on if her discomfort comes again but it is only for comfort and she does not need to be wearing it.  I also asked ENT to take a look in the back of her throat to help guide us as to the significance of such a finding on CAT scan.       I spent 20 minutes in the professional and overall care of this patient.      Hernandez Rosas MD      "

## 2024-09-01 NOTE — PROGRESS NOTES
Occupational Therapy    Evaluation    Patient Name: Johanne Acosta  MRN: [id number]  Today's Date: 9/1/2024  Time Calculation  Start Time: 0920  Stop Time: 0943  Time Calculation (min): 23 min  722/722-A    Assessment  IP OT Assessment  OT Assessment: impaired adls  Evaluation/Treatment Tolerance: Patient limited by fatigue  Medical Staff Made Aware: Yes  End of Session Communication: Bedside nurse  End of Session Patient Position: Bed, 3 rail up, Alarm on (SCDs on and activated)    Plan:  Treatment Interventions: ADL retraining, Visual perceptual retraining, Functional transfer training, UE strengthening/ROM, Endurance training, Cognitive reorientation, Patient/family training, Equipment evaluation/education, Neuromuscular reeducation, Compensatory technique education  OT Frequency: 3 times per week  OT Discharge Recommendations: Moderate intensity level of continued care  OT - OK to Discharge: Yes (once medically appropriate)    Subjective     Current Problem:  1. Fall, initial encounter        2. Chronic low back pain without sciatica, unspecified back pain laterality            General:  General  Reason for Referral: OT eval and treat  Referred By: Norberto Ball PA-C  Past Medical History Relevant to Rehab: previous lumbar fusion, frequent falls, TSR  Co-Treatment: PT  Prior to Session Communication: Bedside nurse  Patient Position Received: Bed, 3 rail up, Alarm on (pt agreeable to OT/PT eval, though lethargic and irritable throughout. RN present)  General Comment: 70 y/o F presents with head, neck and back pain after fall OOB; pt recently discharged from [facility] following lumbar fusion, went to SNF, then fell OOB while at SNF; CT a/p (-) acute, xray bilat hips (-)    Precautions:  Medical Precautions: Fall precautions, Spinal precautions      Pain:  Pain Assessment  Pain Assessment: 0-10  0-10 (Numeric) Pain Score: 8  Pain Type: Acute pain  Pain Location: Back  Pain Interventions: Repositioned, Distraction,  Emotional support    Objective     Cognition:  Overall Cognitive Status:  (A&O x 1; pt takes significant amount of time to perform each task. max cues to keep eyes open throughout, intermittently alert/irritable when being asked questions)  Insight: Severe  Processing Speed: Delayed             Home Living:  Home Living Comments: Pt is a poor historian. Per EMR, pt lives alone and her son lives local. Unable to gather home setup/PLOF       ADL:  Eating Assistance: Minimal  Grooming Assistance: Minimal  Bathing Assistance: Maximal  UE Dressing Assistance: Moderate  LE Dressing Assistance: Total  Toileting Assistance with Device: Total    Activity Tolerance:  Endurance: Decreased tolerance for upright activites    Bed Mobility/Transfers:   Bed Mobility  Bed Mobility: Yes  Bed Mobility 1  Bed Mobility 1: Supine to sitting  Level of Assistance 1: Moderate assistance, +2  Bed Mobility Comments 1: hob elevated, use of bed rails; log roll method  Bed Mobility 2  Bed Mobility  2: Sitting to supine  Level of Assistance 2: Maximum assistance, +2  Bed Mobility 3  Bed Mobility 3: Scooting  Level of Assistance 3: Minimum assistance  Bed Mobility Comments 3: pt completes lateral scooting toward HOB with min A, significant time to perform  Transfers  Transfer:  (not performed for pt safety 2/2 pt lethargy)      Sitting Balance:  Static Sitting Balance  Static Sitting-Comment/Number of Minutes: fair        Sensation:  Light Touch: No apparent deficits    Strength:  Strength Comments: not formally assessed, bilat UEs observed to be WFL        Extremities: RUE   RUE : Within Functional Limits and LUE   LUE: Within Functional Limits    Outcome Measures: [facility] Daily Activity  Putting on and taking off regular lower body clothing: Total  Bathing (including washing, rinsing, drying): A lot  Putting on and taking off regular upper body clothing: A lot  Toileting, which includes using toilet, bedpan or urinal: Total  Taking care of  personal grooming such as brushing teeth: A little  Eating Meals: A little  Daily Activity - Total Score: 12                       EDUCATION:     Education Documentation  Body Mechanics, taught by Karol Olvera OT at 9/1/2024 12:48 PM.  Learner: Patient  Readiness: Acceptance  Method: Explanation  Response: Verbalizes Understanding, Needs Reinforcement    Precautions, taught by Karol Olvera OT at 9/1/2024 12:48 PM.  Learner: Patient  Readiness: Acceptance  Method: Explanation  Response: Verbalizes Understanding, Needs Reinforcement    ADL Training, taught by Karol Olvera OT at 9/1/2024 12:48 PM.  Learner: Patient  Readiness: Acceptance  Method: Explanation  Response: Verbalizes Understanding, Needs Reinforcement    Education Comments  No comments found.        Goals:   Encounter Problems       Encounter Problems (Active)       OT Goals       Mod I for all functional transfers (Progressing)       Start:  09/01/24    Expected End:  09/15/24            Pt will complete grooming tasks while seated EOB with good overall sitting balance and set up assistance for grooming tools (Progressing)       Start:  09/01/24    Expected End:  09/15/24            Pt will complete upper and lower body bathing/dressing; toileting with MIN A using adaptive equipment as needed    (Progressing)       Start:  09/01/24    Expected End:  09/15/24

## 2024-09-01 NOTE — PROGRESS NOTES
Met with patient at bedside to follow up on SNF preferences. Patient is reviewing SNF list for preferences. PT/OT evaluations are pending.

## 2024-09-01 NOTE — PROGRESS NOTES
Johanne Acosta is a 69 y.o. female on day 0 of admission presenting with Lower back pain.      Subjective   69 y.o. female with a significant past medical/surgical history of previous lumbar fusion, displacement of cervical intervertebral disc, frequent falls, with recent back surgery presents via EMS from a skilled nursing facility for injury secondary to mechanical fall.        Objective     Last Recorded Vitals  /68 (BP Location: Left arm, Patient Position: Lying)   Pulse 86   Temp 36.4 °C (97.5 °F) (Temporal)   Resp 18   Wt 84.8 kg (187 lb)   SpO2 91%   Intake/Output last 3 Shifts:    Intake/Output Summary (Last 24 hours) at 9/1/2024 1632  Last data filed at 9/1/2024 1357  Gross per 24 hour   Intake 537 ml   Output 700 ml   Net -163 ml       Admission Weight  Weight: 84.8 kg (187 lb) (08/31/24 0111)    Daily Weight  08/31/24 : 84.8 kg (187 lb)    Image Results  CT chest abdomen pelvis wo IV contrast  Narrative: Interpreted By:  Vazquez Love,   STUDY:  CT CHEST ABDOMEN PELVIS WO CONTRAST; 8/31/2024 6:07 am      INDICATION:  Signs/Symptoms:new anemia with recent back surgery and fall yesterday.      COMPARISON:  None      ACCESSION NUMBER(S):  [id number]      ORDERING CLINICIAN:  TAHMINA KANG      TECHNIQUE:  COMMENTS: This exam is performed without oral or intravenous contrast  as was requested. Please note that the absence of oral and  intravenous contrast material does limit the sensitivity and  specificity of this examination. In particular solid organ assessment  for neoplasm is significantly limited. Assessment of the GI tract is  also limited without the aid of oral contrast administration.  Vascular abnormalities such as dissection, occlusions, infarcts and  thrombus may also go undetected.          One or more of the following dose reduction techniques were used:  Automated exposure control Adjustment of the mA and/or kV according  to patient size, and/or use of iterative reconstruction  technique.      FINDINGS:  CHEST:      There is no hilar or mediastinal lymphadenopathy present.  There is  slight pleural thickening present posteriorly on the right. No  infiltrates or effusions are identified.  Pulmonary parenchyma  assessment is somewhat limited by motion artifact. Lingular  subsegmental atelectasis is present. Left lower lobe subsegmental  atelectasis or linear scarring is present.      Chest Wall: No chest wall masses are identified.      Skeletal System: No fractures or destructive lesions are identified.  Thoracic spondylosis is present diffusely.      _________________________________________________________      CT SCAN OF THE ABDOMEN AND PELVIS FINDINGS:  ABDOMEN CT:  SOLID ORGAN ASSESSMENT:  Within limits of this unenhanced study no focal masses are identified  within the liver, spleen, pancreas, kidneys or adrenal glands. There  is a small calcified stone along the dependent portion of the  gallbladder. No gallbladder wall thickening or pericholecystic fluid  is identified.      RETROPERITONEUM:  AORTA:  There is no evidence for abdominal aortic aneurysm.      LYMPH NODES: There is no evidence for retroperitoneal lymphadenopathy.      BOWEL ASSESSMENT:  No intraperitoneal free air is identified. There is a nonspecific  appearance of the stomach.  The small bowel is unremarkable in  appearance. There is normal appearance of the terminal ileum and the  appendix. There is diverticulosis of the descending colon sigmoid  colon without CT evidence for diverticulitis.      ABDOMINAL AND PELVIC WALLS: There is a very small mesenteric fat  containing umbilical hernia. No abdominal wall masses are identified.      OSSEOUS STRUCTURES: No lytic or blastic lesions are identified.  Patient is status post bilateral pedicle screw and bar fusion of L3  through the iliac wings. Artifact from the fixation devices obscures  the operative bed. There is soft tissue air at the operative site.  There is  suspicion of some fluid at the operative site but this is  obscured by artifact from the fixation devices.      PELVIC CT:  Urinary bladder is markedly distended measuring 17 cm in craniocaudal  extent with a single air bubble within the urinary bladder. Uterus  and adnexa are unremarkable in appearance. No pelvic lymphadenopathy  is identified.      Impression: 1. Lingular subsegmental atelectasis is present along the left lower  lobe subsegmental atelectasis or linear scarring.  2. Cholelithiasis.  3. Left-sided diverticulosis without CT evidence for diverticulitis.  4. Postoperative appearance of the lumbar spine.  5. Marked distention of the urinary bladder.  6. Air and fluid at the operative site, not well evaluated due to  artifact from the fixation devices.          MACRO:  none      Signed by: Vazquez Love 8/31/2024 7:34 AM  Dictation workstation:   [id number]  XR hips bilateral 3 or 4 VW w pelvis when performed  Narrative: Interpreted By:  Alicia Mota,   STUDY:  XR HIPS BILATERAL 3 OR 4 VW WITH PELVIS WHEN PERFORMED;  8/31/2024  3:05 am      INDICATION:  Signs/Symptoms:fall.      COMPARISON:  Left hip x-ray 07/28/2021      ACCESSION NUMBER(S):  [id number]      ORDERING CLINICIAN:  VAZQUEZ CRAMER      FINDINGS:  AP view of the pelvis, AP and frogleg views of the left hip and AP  and frogleg views of the right hip were obtained.      There is no acute fracture or dislocation. The pelvic ring appears  intact. Moderate degenerative changes are noted at the left hip. Mild  degenerative changes at the right hip. There is orthopedic hardware  transfixing the lower lumbar spine, the sacrum and the bilateral  sacroiliac joints. There are overlying skin staples.  The soft  tissues are unremarkable.      Impression: 1.  No radiographic evidence for acute fracture. Moderate  degenerative changes at the left hip. Mild degenerative changes at  the right hip.              MACRO:  None.      Signed by: Alicia  Nakul 8/31/2024 3:28 AM  Dictation workstation:   [id number]  CT head wo IV contrast, CT cervical spine wo IV contrast, CT thoracic spine wo IV contrast, CT lumbar spine wo IV contrast  Narrative: Interpreted By:  Alicia Mota,   STUDY:  CT HEAD WO IV CONTRAST; CT CERVICAL SPINE WO IV CONTRAST; CT THORACIC  SPINE WO IV CONTRAST; CT LUMBAR SPINE WO IV CONTRAST;  8/31/2024 2:42  am      INDICATION:  Signs/Symptoms:fall.      COMPARISON:  CT sinus 03/28/2008. MRI lumbar spine and MRI cervical spine  06/25/2024. CT cervical spine, thoracic spine and lumbar spine  11/02/2015      ACCESSION NUMBER(S):  [id number]; [id number]; [id number]; [id number]      ORDERING CLINICIAN:  ALYX CRAMER      TECHNIQUE:  Axial noncontrast images of the head  with coronal  and sagittal  reconstructed images . Axial noncontrast images of the cervical  spine, thoracic spine and lumbar spine with coronal and sagittal  reconstructed images.      FINDINGS:  BRAIN PARENCHYMA: There are periventricular white matter  hypodensities, probably representing chronic microvascular ischemic  changes. Otherwise, the gray-white matter interfaces are preserved.  No acute territorial infarct, mass effect or midline shift.  HEMORRHAGE: No acute intracranial hemorrhage. VENTRICLES and  EXTRA-AXIAL SPACES: Prominent, consistent with diffuse parenchymal  volume loss. No extra-axial fluid collection. EXTRACRANIAL SOFT  TISSUES: Within normal limits. CALVARIUM: No depressed calvarial  fracture. PARANASAL SINUSES: No air-fluid levels. Small amount of  mucus at the left sphenoid sinus. MASTOIDS: Within normal limits.      CERVICAL SPINE: There is diffuse osteopenia.  ALIGNMENT: There is straightening of the cervical lordosis. No  traumatic facet widening. VERTEBRAE: No acute fracture.  DISCS: There is multilevel degenerative disc disease with  osteophytosis. Redemonstration of interbody fusion at C6-C7. There is  multilevel facet arthropathy.  PREVERTEBRAL SOFT TISSUES: Mild  prevertebral soft tissue edema at C2-C5. LUNG APICES: No acute  findings at the visualized lung apices.          THORACIC SPINE: There is diffuse osteopenia.  ALIGNMENT: Within normal limits.  VERTEBRAE: No acute fracture.  DISCS: There is multilevel degenerative disc disease with  osteophytosis. PREVERTEBRAL SOFT TISSUES: No prevertebral soft tissue  swelling. VISUALIZED LUNGS: Motion artifact degrades the evaluation  of the visualized lungs. There is mild dependent bilateral  atelectasis.      LUMBAR SPINE: There is diffuse osteopenia. There is streak artifact  from the orthopedic hardware at the lumbar spine and at the sacrum.  ALIGNMENT: Within normal limits. VERTEBRAE: No acute fracture.  Postsurgical changes with posterior orthopedic hardware extending  through L3, L5, through the sacrum and across the bilateral  sacroiliac joints. DISCS: Intervertebral disc spacers at L3-L4, L4-L5  and L5-S1. There is mild degenerative disc disease. PREVERTEBRAL SOFT  TISSUES: No prevertebral soft tissue swelling. OTHER: Small amount of  gas noted at the paraspinal soft tissues at the lumbar spine with  overlying skin staples, probably secondary to recent surgery. The  visualized urinary bladder is distended. There is colonic  diverticulosis with no acute diverticulitis at the visualized colon.      Impression: 1. No acute intracranial hemorrhage or depressed calvarial fracture.  2. Mild prevertebral soft tissue edema at the cervical spine. Given  the history of trauma, MRI cervical spine can be obtained for further  evaluation for acute injury.  3. Otherwise, no acute fracture at the cervical spine, thoracic spine  or lumbar spine. Multilevel degenerative changes. Postsurgical  changes at the lumbar spine and sacrum.              MACRO:  Alicia Mota discussed the significance and urgency of this  critical finding by telephone with  ALYX CRAMER on 8/31/2024 at  3:22 am.  (**-RCF-**)  Findings:  See findings.      Signed by: Alicia Mota 8/31/2024 3:23 AM  Dictation workstation:   [id number]      Physical Exam    Relevant Results               Assessment/Plan          Seen today so far collar is off condition stable no problems or issues  Still complaining of pain and generalized complaints await for ENT to see her if he clears her or go ahead and discharge her to rehab        Assessment & Plan  Lower back pain                  Miguel Chavez,

## 2024-09-01 NOTE — CARE PLAN
Problem: Pain - Adult  Goal: Verbalizes/displays adequate comfort level or baseline comfort level  Outcome: Progressing     Problem: Safety - Adult  Goal: Free from fall injury  Outcome: Progressing     Problem: Discharge Planning  Goal: Discharge to home or other facility with appropriate resources  Outcome: Progressing     Problem: Chronic Conditions and Co-morbidities  Goal: Patient's chronic conditions and co-morbidity symptoms are monitored and maintained or improved  Outcome: Progressing     Problem: Skin  Goal: Decreased wound size/increased tissue granulation at next dressing change  Outcome: Progressing  Flowsheets (Taken 9/1/2024 0002)  Decreased wound size/increased tissue granulation at next dressing change:   Promote sleep for wound healing   Utilize specialty bed per algorithm   Protective dressings over bony prominences  Goal: Prevent/minimize sheer/friction injuries  Outcome: Progressing  Flowsheets (Taken 9/1/2024 0002)  Prevent/minimize sheer/friction injuries:   Complete micro-shifts as needed if patient unable. Adjust patient position to relieve pressure points, not a full turn   Increase activity/out of bed for meals   Use pull sheet   Utilize specialty bed per algorithm  Goal: Promote skin healing  Outcome: Progressing   The patient's goals for the shift include      The clinical goals for the shift include pain management
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
## 2024-09-02 PROCEDURE — 2500000001 HC RX 250 WO HCPCS SELF ADMINISTERED DRUGS (ALT 637 FOR MEDICARE OP): Performed by: NURSE PRACTITIONER

## 2024-09-02 PROCEDURE — G0378 HOSPITAL OBSERVATION PER HR: HCPCS

## 2024-09-02 PROCEDURE — 96376 TX/PRO/DX INJ SAME DRUG ADON: CPT

## 2024-09-02 PROCEDURE — 2500000004 HC RX 250 GENERAL PHARMACY W/ HCPCS (ALT 636 FOR OP/ED)

## 2024-09-02 PROCEDURE — 2500000001 HC RX 250 WO HCPCS SELF ADMINISTERED DRUGS (ALT 637 FOR MEDICARE OP): Performed by: PHYSICIAN ASSISTANT

## 2024-09-02 ASSESSMENT — PAIN - FUNCTIONAL ASSESSMENT: PAIN_FUNCTIONAL_ASSESSMENT: 0-10

## 2024-09-02 ASSESSMENT — COGNITIVE AND FUNCTIONAL STATUS - GENERAL
HELP NEEDED FOR BATHING: A LITTLE
TURNING FROM BACK TO SIDE WHILE IN FLAT BAD: A LITTLE
MOBILITY SCORE: 18
WALKING IN HOSPITAL ROOM: A LITTLE
DRESSING REGULAR UPPER BODY CLOTHING: A LITTLE
DAILY ACTIVITIY SCORE: 17
DRESSING REGULAR LOWER BODY CLOTHING: A LITTLE
TOILETING: A LOT
EATING MEALS: A LITTLE
PERSONAL GROOMING: A LITTLE
MOVING TO AND FROM BED TO CHAIR: A LITTLE
STANDING UP FROM CHAIR USING ARMS: A LITTLE
MOVING FROM LYING ON BACK TO SITTING ON SIDE OF FLAT BED WITH BEDRAILS: A LITTLE
CLIMB 3 TO 5 STEPS WITH RAILING: A LITTLE

## 2024-09-02 ASSESSMENT — PAIN SCALES - GENERAL
PAINLEVEL_OUTOF10: 0 - NO PAIN
PAINLEVEL_OUTOF10: 2
PAINLEVEL_OUTOF10: 0 - NO PAIN
PAINLEVEL_OUTOF10: 9
PAINLEVEL_OUTOF10: 7
PAINLEVEL_OUTOF10: 6

## 2024-09-02 ASSESSMENT — PAIN DESCRIPTION - ORIENTATION: ORIENTATION: LEFT;LOWER

## 2024-09-02 ASSESSMENT — PAIN SCALES - PAIN ASSESSMENT IN ADVANCED DEMENTIA (PAINAD): TOTALSCORE: MEDICATION (SEE MAR)

## 2024-09-02 NOTE — PROGRESS NOTES
Pre cert started per team, plan is to return to [facility].  Yessenia Godoy RN TCC
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
## 2024-09-02 NOTE — PROGRESS NOTES
"Johanne Acosta is a 69 y.o. female on day 0 of admission presenting with Lower back pain.    Subjective   Patient is sleeping comfortably when I came in.  Her first complaint was that they are not giving her enough pain medication.  She did not realize that they had to move her room because she was trying to get out of bed without assistance.  The neck pain is still not present and her back is painful but she does not look as uncomfortable as she did on Saturday.       Objective     Physical Exam: The incision has been covered because she said she was scratching at.  Her legs are moving well and good strength.  I did not have her stand today as it did yesterday.  Sensation in the legs is fine the arm strength is full and normal sensation.  She seems more coherent today.      Last Recorded Vitals  Blood pressure 171/75, pulse 76, temperature 36.7 °C (98.1 °F), resp. rate 18, height 1.676 m (5' 6\"), weight 84.8 kg (187 lb), SpO2 93%.  Intake/Output last 3 Shifts:  I/O last 3 completed shifts:  In: 300 (3.5 mL/kg) [P.O.:300]  Out: 700 (8.3 mL/kg) [Urine:700 (0.2 mL/kg/hr)]  Weight: 84.8 kg     Relevant Results                              Assessment/Plan   Assessment & Plan  Lower back pain    69-year-old female just short of the week being postop of a multilevel revision lumbar fusion from L3 to the sacrum.  No sign of infection.  She did fall the neck pain has resolved.  We need to work on placement again.  2 days ago she said she did not want to go back to the skilled nursing facility.  Today she said that she would be willing to go back to that skilled nursing facility if they would accept her back.  Ideally the physical therapist will work with the  team and work on getting her back to that skilled facility.       I spent 20 minutes in the professional and overall care of this patient.      eHrnandez Rosas MD      "

## 2024-09-02 NOTE — CARE PLAN
The patient's goals for the shift include  safety    The clinical goals for the shift include pt wiill remain safe and comftorable for the remainder of the shift

## 2024-09-02 NOTE — PROGRESS NOTES
Dispo planning for SNF. Spoke to patient at bedside for choices. Patient with no preference and agreeable to blanket referral. Per Neurosurgery note, patient agreeable to go back to [facility] now. DSC tasked to send referral to [facility], [facility] and [place] [facility]. Awaiting acceptance. Will need precert. TCC to continue to follow for discharge planning.   AI ABREU RN TCC

## 2024-09-03 ENCOUNTER — APPOINTMENT (OUTPATIENT)
Dept: RADIOLOGY | Facility: HOSPITAL | Age: 69
End: 2024-09-03
Payer: COMMERCIAL

## 2024-09-03 VITALS
BODY MASS INDEX: 30.05 KG/M2 | RESPIRATION RATE: 18 BRPM | OXYGEN SATURATION: 96 % | DIASTOLIC BLOOD PRESSURE: 58 MMHG | TEMPERATURE: 98.6 F | HEIGHT: 66 IN | HEART RATE: 86 BPM | SYSTOLIC BLOOD PRESSURE: 118 MMHG | WEIGHT: 187 LBS

## 2024-09-03 LAB
APPEARANCE UR: CLEAR
BACTERIA #/AREA URNS AUTO: ABNORMAL /HPF
BILIRUB UR STRIP.AUTO-MCNC: NEGATIVE MG/DL
COLOR UR: ABNORMAL
GLUCOSE UR STRIP.AUTO-MCNC: NORMAL MG/DL
KETONES UR STRIP.AUTO-MCNC: NEGATIVE MG/DL
LEUKOCYTE ESTERASE UR QL STRIP.AUTO: ABNORMAL
NITRITE UR QL STRIP.AUTO: ABNORMAL
PH UR STRIP.AUTO: 6.5 [PH]
PROT UR STRIP.AUTO-MCNC: NEGATIVE MG/DL
RBC # UR STRIP.AUTO: NEGATIVE /UL
RBC #/AREA URNS AUTO: ABNORMAL /HPF
SP GR UR STRIP.AUTO: 1.01
SQUAMOUS #/AREA URNS AUTO: ABNORMAL /HPF
TRANS CELLS #/AREA UR COMP ASSIST: ABNORMAL /HPF
UROBILINOGEN UR STRIP.AUTO-MCNC: NORMAL MG/DL
WBC #/AREA URNS AUTO: ABNORMAL /HPF

## 2024-09-03 PROCEDURE — 99221 1ST HOSP IP/OBS SF/LOW 40: CPT | Performed by: STUDENT IN AN ORGANIZED HEALTH CARE EDUCATION/TRAINING PROGRAM

## 2024-09-03 PROCEDURE — 2500000004 HC RX 250 GENERAL PHARMACY W/ HCPCS (ALT 636 FOR OP/ED)

## 2024-09-03 PROCEDURE — 97116 GAIT TRAINING THERAPY: CPT | Mod: GP

## 2024-09-03 PROCEDURE — 97535 SELF CARE MNGMENT TRAINING: CPT | Mod: GO

## 2024-09-03 PROCEDURE — 96376 TX/PRO/DX INJ SAME DRUG ADON: CPT

## 2024-09-03 PROCEDURE — 72125 CT NECK SPINE W/O DYE: CPT

## 2024-09-03 PROCEDURE — 2500000001 HC RX 250 WO HCPCS SELF ADMINISTERED DRUGS (ALT 637 FOR MEDICARE OP): Performed by: NURSE PRACTITIONER

## 2024-09-03 PROCEDURE — 81001 URINALYSIS AUTO W/SCOPE: CPT | Performed by: INTERNAL MEDICINE

## 2024-09-03 PROCEDURE — 2500000001 HC RX 250 WO HCPCS SELF ADMINISTERED DRUGS (ALT 637 FOR MEDICARE OP): Performed by: PHYSICIAN ASSISTANT

## 2024-09-03 PROCEDURE — 87086 URINE CULTURE/COLONY COUNT: CPT | Mod: PARLAB | Performed by: INTERNAL MEDICINE

## 2024-09-03 PROCEDURE — 70450 CT HEAD/BRAIN W/O DYE: CPT

## 2024-09-03 PROCEDURE — 70450 CT HEAD/BRAIN W/O DYE: CPT | Performed by: STUDENT IN AN ORGANIZED HEALTH CARE EDUCATION/TRAINING PROGRAM

## 2024-09-03 PROCEDURE — G0378 HOSPITAL OBSERVATION PER HR: HCPCS

## 2024-09-03 PROCEDURE — 72125 CT NECK SPINE W/O DYE: CPT | Performed by: STUDENT IN AN ORGANIZED HEALTH CARE EDUCATION/TRAINING PROGRAM

## 2024-09-03 RX ORDER — ACETAMINOPHEN 325 MG/1
650 TABLET ORAL EVERY 6 HOURS PRN
Status: DISCONTINUED | OUTPATIENT
Start: 2024-09-03 | End: 2024-09-03 | Stop reason: HOSPADM

## 2024-09-03 ASSESSMENT — COGNITIVE AND FUNCTIONAL STATUS - GENERAL
EATING MEALS: A LITTLE
HELP NEEDED FOR BATHING: A LOT
MOVING TO AND FROM BED TO CHAIR: A LITTLE
WALKING IN HOSPITAL ROOM: A LITTLE
TOILETING: A LOT
CLIMB 3 TO 5 STEPS WITH RAILING: TOTAL
STANDING UP FROM CHAIR USING ARMS: A LITTLE
DRESSING REGULAR UPPER BODY CLOTHING: A LOT
TURNING FROM BACK TO SIDE WHILE IN FLAT BAD: A LITTLE
DAILY ACTIVITIY SCORE: 14
DRESSING REGULAR LOWER BODY CLOTHING: A LOT
MOVING FROM LYING ON BACK TO SITTING ON SIDE OF FLAT BED WITH BEDRAILS: A LITTLE
PERSONAL GROOMING: A LITTLE
MOBILITY SCORE: 16

## 2024-09-03 ASSESSMENT — ACTIVITIES OF DAILY LIVING (ADL): HOME_MANAGEMENT_TIME_ENTRY: 16

## 2024-09-03 ASSESSMENT — PAIN SCALES - GENERAL
PAINLEVEL_OUTOF10: 5 - MODERATE PAIN
PAINLEVEL_OUTOF10: 6
PAINLEVEL_OUTOF10: 5 - MODERATE PAIN
PAINLEVEL_OUTOF10: 0 - NO PAIN

## 2024-09-03 ASSESSMENT — PAIN DESCRIPTION - LOCATION
LOCATION: BACK
LOCATION: BACK

## 2024-09-03 ASSESSMENT — PAIN - FUNCTIONAL ASSESSMENT
PAIN_FUNCTIONAL_ASSESSMENT: 0-10
PAIN_FUNCTIONAL_ASSESSMENT: 0-10

## 2024-09-03 ASSESSMENT — PAIN DESCRIPTION - ORIENTATION: ORIENTATION: LEFT;LOWER
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
## 2024-09-03 NOTE — DISCHARGE SUMMARY
Discharge Diagnosis  Lower back pain    Issues Requiring Follow-Up      Discharge Meds     Medication List      CONTINUE taking these medications     atorvastatin 40 mg tablet; Commonly known as: Lipitor; Take 1 tablet (40   mg) by mouth once daily.   buPROPion  mg 24 hr tablet; Commonly known as: Forfivo XL; Take 1   tablet daily (daniel)   celecoxib 200 mg capsule; Commonly known as: CeleBREX; TAKE 1 CAPSULE BY   MOUTH ONCE DAILY   DULoxetine 60 mg DR capsule; Commonly known as: Cymbalta; Take 1 capsule   (60 mg) by mouth once daily.   FLAXSEED OIL ORAL   * folic acid 1 mg tablet; Commonly known as: Folvite   * folic acid 1 mg tablet; Commonly known as: Folvite; Take 1 tablet (1   mg) by mouth once daily.   lidocaine 5 % ointment; Commonly known as: Xylocaine; Apply topically 3   times a day. apply to affected areas tid for chronic pain DANIEL   memantine 10 mg tablet; Commonly known as: Namenda; Take 1 tablet (10   mg) by mouth 2 times a day.   metoprolol tartrate 25 mg tablet; Commonly known as: Lopressor; Take 0.5   tablets (12.5 mg) by mouth 2 times a day. for rapid heart beat /   palpitations   multivitamin tablet; Take 1 tablet by mouth once daily.  * This list has 2 medication(s) that are the same as other medications   prescribed for you. Read the directions carefully, and ask your doctor or   other care provider to review them with you.       Test Results Pending At Discharge  Pending Labs       Order Current Status    Extra Urine Gray Tube In process    Urinalysis with Reflex Culture and Microscopic In process    Urine Culture In process            Hospital Course   69 y.o. female with a significant past medical/surgical history of previous lumbar fusion, displacement of cervical intervertebral disc, frequent falls, with recent back surgery presents via EMS from a skilled nursing facility for injury secondary to mechanical fall.  The patient reports that she rolled out of bed accidentally this evening.   She states low suspicion for head injury.  She denies any use of anticoagulants.  Denies any loss of consciousness.  She currently rates 7/10 pain in her lumbar back at site of procedure.  Denies pain anywhere else.  Patient was provided with 50 mcg of fentanyl and route by EMS. Patient denies history of anemia. She admits to taking 2 Advil daily for back pain. Denies abdominal pain, black stools.      ED course: Vitals stable.  Imaging of head, spine, hips performed.  Per radiology, mild prevertebral soft tissue edema observed at the cervical spine per radiology.  Per ED provider, case discussed with Dr. Rosas of neurosurgery who evaluated the patient's imaging and suspected the prevertebral edema may have been from the intubation during her recent neurosurgical procedure at [facility] by Dr. Neri.  Patient treated with fentanyl, placed in c-collar, and neurosurgery consulted.  She was okay no surgery was indicated she was stable had to go back to rehab on the day of her discharge she was much more awake more alert I reviewed all of her meds and her labs and made arrangements she went back to the facility discharged the management greater than 30 minutes total time thank you    Pertinent Physical Exam At Time of Discharge  Physical Exam    Outpatient Follow-Up  Future Appointments   Date Time Provider Department Center   9/20/2024 12:30 PM Tommy Goodwin PA-C [id number] None   10/9/2024  1:50 PM YUMIKO Harrell-CNP [id number] [facility]   11/26/2024  2:30 PM Jer Neri MD PhD [id number] None         Miguel Chavez DO

## 2024-09-03 NOTE — PROGRESS NOTES
Patient has written discharge order.  Discharge plan for patient to return to [facility].  Pre-cert started by DP Team.  Pending precert ref# [id number].  Care Transitions will continue to follow.     1:55 pm Addendum  Received secure message, patient ok to return to [facility] under previous auth, per [facility] rep.  Certification and discharge orders completed per CNP.  DSC tasked to set up transport for patient to return.  This TCC phoned and spoke with Dr. Chavez and provided update.  Care Transitions will continue to follow.    3:05 pm Addendum  The [facility] Vehicle is scheduled to arrive at 5:00 pm EDT.  [facility] is handling this ride and you can contact them at [phone]. Nurse to nurse report number to call is [phone].  This TCC phone and spoke with patient's son and provided updates.  Nursing updated.  Care Transitions will continue to follow.

## 2024-09-03 NOTE — CARE PLAN
The patient's goals for the shift include  safety    The clinical goals for the shift include Safety

## 2024-09-03 NOTE — CONSULTS
Reason For Consult  Sore throat, abnormal imaging    History Of Present Illness  Johanne Acosta is a 69 y.o. female presenting with fall after recent back surgery.    The patient was recently admitted following an unwitnessed fall wherein she reportedly rolled out of bed onto the floor.  She denied any loss of consciousness.  She recently had spine surgery at [facility].  During imaging patient was noted to have mild prevertebral edema at the cervical spine level.  Neurosurgery was consulted and recommended further evaluation by ENT.  On my review of the patient's initial imaging I appreciated only minimal prevertebral edema and a fairly prominent left internal carotid artery with prominent medial curvature into the posterior pharynx.    The patient reports that she has some mild throat discomfort over 2 days during the weekend.  This is largely resolved.  No other concerns.     Past Medical History  She has a past medical history of Pain in right shoulder (04/20/2021), Personal history of other diseases of the musculoskeletal system and connective tissue (04/20/2021), and Personal history of other diseases of the musculoskeletal system and connective tissue (01/21/2021).    Surgical History  She has no past surgical history on file.     Social History  She reports that she has never smoked. She has never used smokeless tobacco. She reports current alcohol use. She reports that she does not use drugs.    Family History  Family History   Problem Relation Name Age of Onset    Alcohol abuse Father      Other (heart problems) Father      Alcohol abuse Maternal Grandfather      Lung cancer Maternal Grandfather          Allergies  Corticosteroids (glucocorticoids), Penicillins, Aspirin, Buspirone, Codeine, Hydrocodone-acetaminophen, Prednisone, and Vancomycin     Physical Exam  CONSTITUTIONAL: Well appearing female who appears stated age.  PSYCHIATRIC: Alert, appropriate mood and affect.  RESPIRATORY: Normal  "inspiration and expiration and chest wall expansion; no use of accessory muscles to breathe.  VOICE: Clear speech without hoarseness. No stridor nor stertor.  HEAD, FACE, AND SKIN: Symmetric facial feature.  OROPHARYNX: No lesion nor mucosal abnormality. The uvula was normal appearing. The tonsils were surgically absent.  Prominent left pharyngeal wall.  NECK: Visualization and palpation of the neck revealed no mass lesions, no thyromegaly or thyroid masses. No cutaneous lesions appreciated.  LYMPHATICS (CERVICAL): There were no palpable lymph nodes in the posterior triangle, submandibular triangle, jugulodigastric region, nor central neck.    Last Recorded Vitals  Blood pressure 118/58, pulse 86, temperature 37 °C (98.6 °F), resp. rate 18, height 1.676 m (5' 6\"), weight 84.8 kg (187 lb), SpO2 96%.    Relevant Results  CT cervical spine/head 9/3/2024  I personally reviewed the patient's imaging from today.  I appreciate no significant prevertebral edema.  No paranasal sinus disease.    CT cervical spine 8/31/2024  IMPRESSION:  1. No acute intracranial hemorrhage or depressed calvarial fracture.  2. Mild prevertebral soft tissue edema at the cervical spine. Given  the history of trauma, MRI cervical spine can be obtained for further  evaluation for acute injury.  3. Otherwise, no acute fracture at the cervical spine, thoracic spine  or lumbar spine. Multilevel degenerative changes. Postsurgical  changes at the lumbar spine and sacrum.     Assessment/Plan     69 y.o. female seen for incidentally noted prevertebral edema associated with throat discomfort during admission.  No concerning findings on my physical exam.  Imaging not concerning.  Some swelling of the pharynx    Given that the patient is largely asymptomatic regarding her throat and a reassuring and stable imaging, no further intervention is needed at this time.    Albert Reno MD    "

## 2024-09-03 NOTE — PROGRESS NOTES
Occupational Therapy    Occupational Therapy Treatment    Name: Johanne Acosta  MRN: [id number]  : 1955  Date: 24  Time Calculation  Start Time: 1047  Stop Time: 1103  Time Calculation (min): 16 min    Assessment:  OT Assessment: Patient requires extensive assist for all ADLS secondary to impaired cognition, impaired activity tolerance and impaired stand balance. Patient would benefit from 24 hour care and continued O.T. at a moderate intensity.  End of Session Communication: Bedside nurse, PCT/NA/CTA  End of Session Patient Position: Up in chair, Alarm on (call light in reach; patient educated re: fall/safety and instructed to call for assist/not get up on own.)    Plan:  Treatment Interventions: ADL retraining, Visual perceptual retraining, Functional transfer training, UE strengthening/ROM, Endurance training, Cognitive reorientation, Patient/family training, Equipment evaluation/education, Neuromuscular reeducation, Compensatory technique education  OT Frequency: 3 times per week  OT Discharge Recommendations: Moderate intensity level of continued care  OT - OK to Discharge: Yes (once medically appropriate)    Subjective   Previous Visit Info:  OT Last Visit  OT Received On: 24  General:  General  Prior to Session Communication: Bedside nurse  Patient Position Received: Up in chair, Alarm on  Precautions:  Precautions Comment: fal/safety, posey alarm, spinal precautions     Pain Assessment:  Pain Assessment  Pain Assessment:  (patient with vague reports of pain low back and legs)  Pain Interventions: Repositioned     Objective   Cognition:  Overall Cognitive Status:  (oriented to person and place; max cues to tend to task, distractible, confused, impaired problem solving, impaired safety awareness, slow processing)    Activities of Daily Living:  Grooming  Grooming Level of Assistance:  (SBA to wash face & hands and brush hair; max cues for effectiveness; appears to perseverate at times.)  LE  Dressing  LE Dressing:  (Unable to reach LE's for ADLs due to impaired flexibility s/p surgery; Educated re: use of adaptive equipment; requires max assist to thread pants with reachers, max assist to don socks with sock aide; max cues for attention to task and problem solving)        Outcome Measures:  [facility] Daily Activity  Putting on and taking off regular lower body clothing: A lot  Bathing (including washing, rinsing, drying): A lot  Putting on and taking off regular upper body clothing: A lot  Toileting, which includes using toilet, bedpan or urinal: A lot  Taking care of personal grooming such as brushing teeth: A little  Eating Meals: A little  Daily Activity - Total Score: 14        Education Documentation  Body Mechanics, taught by Litzy Hollis OT at 9/3/2024 11:18 AM.  Learner: Patient  Readiness: Acceptance  Method: Explanation, Demonstration  Response: Needs Reinforcement    Precautions, taught by Litzy Hollis OT at 9/3/2024 11:18 AM.  Learner: Patient  Readiness: Acceptance  Method: Explanation, Demonstration  Response: Needs Reinforcement    ADL Training, taught by Litzy Hollis OT at 9/3/2024 11:18 AM.  Learner: Patient  Readiness: Acceptance  Method: Explanation, Demonstration  Response: Needs Reinforcement    Education Comments  No comments found.      Goals:  Encounter Problems       Encounter Problems (Active)       OT Goals       Mod I for all functional transfers (Progressing)       Start:  09/01/24    Expected End:  09/15/24            Pt will complete grooming tasks while seated EOB with good overall sitting balance and set up assistance for grooming tools (Progressing)       Start:  09/01/24    Expected End:  09/15/24            Pt will complete upper and lower body bathing/dressing; toileting with MIN A using adaptive equipment as needed    (Progressing)       Start:  09/01/24    Expected End:  09/15/24

## 2024-09-03 NOTE — PROGRESS NOTES
I stopped by to see the patient this morning.  She is very emotionally labile this morning.  Her wound is clean and dry.  Her legs feel normal but she still having some back pain.  I do not see that physical therapy is actively treating this patient.  I do not see how we are going to get her back into a skilled facility or rehab facility if physical and Occupational Therapy are not on board.    Hernandez Rosas MD
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
## 2024-09-03 NOTE — NURSING NOTE
Nurse attempted to give nurse to nurse report to the [facility]. After long wait phone was answered then call was placed on hold and thereafter someone picked up and the phone and hung it up disconnecting the call. Nurse to nurse report unable to be given.

## 2024-09-04 DIAGNOSIS — R41.3 MEMORY LOSS: ICD-10-CM

## 2024-09-04 LAB — HOLD SPECIMEN: NORMAL

## 2024-09-04 RX ORDER — MEMANTINE HYDROCHLORIDE 10 MG/1
10 TABLET ORAL 2 TIMES DAILY
Qty: 90 TABLET | Refills: 3 | Status: SHIPPED | OUTPATIENT
Start: 2024-09-04

## 2024-09-05 ENCOUNTER — APPOINTMENT (OUTPATIENT)
Dept: OTOLARYNGOLOGY | Facility: CLINIC | Age: 69
End: 2024-09-05
Payer: COMMERCIAL

## 2024-09-05 LAB — BACTERIA UR CULT: ABNORMAL

## 2024-09-06 ENCOUNTER — NURSING HOME VISIT (OUTPATIENT)
Dept: POST ACUTE CARE | Facility: EXTERNAL LOCATION | Age: 69
End: 2024-09-06
Payer: COMMERCIAL

## 2024-09-06 DIAGNOSIS — F03.93 DEMENTIA WITH MOOD DISTURBANCE, UNSPECIFIED DEMENTIA SEVERITY, UNSPECIFIED DEMENTIA TYPE (MULTI): ICD-10-CM

## 2024-09-06 DIAGNOSIS — F41.9 ANXIETY AND DEPRESSION: ICD-10-CM

## 2024-09-06 DIAGNOSIS — R53.1 WEAKNESS: ICD-10-CM

## 2024-09-06 DIAGNOSIS — K21.00 GASTROESOPHAGEAL REFLUX DISEASE WITH ESOPHAGITIS, UNSPECIFIED WHETHER HEMORRHAGE: ICD-10-CM

## 2024-09-06 DIAGNOSIS — Z98.1 STATUS POST LUMBAR AND LUMBOSACRAL FUSION BY ANTERIOR TECHNIQUE: Primary | ICD-10-CM

## 2024-09-06 DIAGNOSIS — F32.A ANXIETY AND DEPRESSION: ICD-10-CM

## 2024-09-06 PROCEDURE — 99309 SBSQ NF CARE MODERATE MDM 30: CPT | Performed by: REGISTERED NURSE

## 2024-09-06 NOTE — LETTER
Patient: Johanne Acosta  : 1955    Encounter Date: 2024    PROGRESS NOTE    Subjective  Chief complaint: Johanne Acosta is a 69 y.o. female who is an acute skilled patient being seen and evaluated for weakness    HPI:  24 Patient admitted to SNF for therapy d/t weakness after recent hospitalization.   Patient requires assist with ADLs and transfers.  No new complaints.      Objective  Vital signs: 112/56, 97.6, 94, 18, 105.0, 95%    Physical Exam  Constitutional:       General: She is not in acute distress.  Eyes:      Extraocular Movements: Extraocular movements intact.   Pulmonary:      Effort: Pulmonary effort is normal.   Musculoskeletal:      Cervical back: Neck supple.   Neurological:      Mental Status: She is alert.   Psychiatric:         Mood and Affect: Mood normal.         Behavior: Behavior is cooperative.         Assessment/Plan  Problem List Items Addressed This Visit       Anxiety and depression     Continue current medications          GERD (gastroesophageal reflux disease)    Dementia with mood disturbance, unspecified dementia severity, unspecified dementia type (Multi)     Mentation at baseline          Status post lumbar and lumbosacral fusion by anterior technique - Primary     Pain mgmt  Pt/ot   Pt having increase pain/discomfort   States tylenol is not managing pain  Start tramadol prn            Weakness     Continue pt/ot           Medications, treatments, and labs reviewed  Continue medications and treatments as listed in [facility]    Scribe Attestation  I, Nathaniel Reese   attest that this documentation has been prepared under the direction and in the presence of LANDY Paredes.    Provider Attestation - Scribe documentation  All medical record entries made by the Scribe were at my direction and personally dictated by me. I have reviewed the chart and agree that the record accurately reflects my personal performance of the history, physical exam, discussion  and plan.    LANDY Paredes        Electronically Signed By: LANDY Paredes   9/12/24  9:01 AM

## 2024-09-09 ENCOUNTER — NURSING HOME VISIT (OUTPATIENT)
Dept: POST ACUTE CARE | Facility: EXTERNAL LOCATION | Age: 69
End: 2024-09-09
Payer: COMMERCIAL

## 2024-09-09 DIAGNOSIS — F03.93 DEMENTIA WITH MOOD DISTURBANCE, UNSPECIFIED DEMENTIA SEVERITY, UNSPECIFIED DEMENTIA TYPE (MULTI): ICD-10-CM

## 2024-09-09 DIAGNOSIS — R53.1 WEAKNESS: Primary | ICD-10-CM

## 2024-09-09 DIAGNOSIS — Z98.1 STATUS POST LUMBAR AND LUMBOSACRAL FUSION BY ANTERIOR TECHNIQUE: ICD-10-CM

## 2024-09-09 DIAGNOSIS — F33.1 MODERATE EPISODE OF RECURRENT MAJOR DEPRESSIVE DISORDER: Chronic | ICD-10-CM

## 2024-09-09 PROCEDURE — 99308 SBSQ NF CARE LOW MDM 20: CPT | Performed by: REGISTERED NURSE

## 2024-09-09 NOTE — LETTER
Patient: Johanne Acosta  : 1955    Encounter Date: 2024    PROGRESS NOTE    Subjective  Chief complaint: Johanne Acosta is a 69 y.o. female who is an acute skilled patient being seen and evaluated for weakness    HPI:  24 Patient has been working in therapy to improve strength, endurance, and ADLs.  Patient continues to work toward goals.  No new concerns today.  Denies n/v/f/c pain.  Pt continues to put self on floor no acute injury.     Objective  Vital signs: 112/56, 97.6, 94, 18, 105.0, 95%    Physical Exam  Constitutional:       General: She is not in acute distress.  Eyes:      Extraocular Movements: Extraocular movements intact.   Pulmonary:      Effort: Pulmonary effort is normal.   Musculoskeletal:      Cervical back: Neck supple.   Neurological:      Mental Status: She is alert.   Psychiatric:         Mood and Affect: Mood normal.         Behavior: Behavior is cooperative.         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    Provider Attestation - Scribe documentation  All medical record entries made by the Scribe were at my direction and personally dictated by me. I have reviewed the chart and agree that the record accurately reflects my personal  performance of the history, physical exam, discussion and plan.    LANDY Paredes        Electronically Signed By: LANDY Paredes   9/16/24  5:46 PM

## 2024-09-10 ENCOUNTER — NURSING HOME VISIT (OUTPATIENT)
Dept: POST ACUTE CARE | Facility: EXTERNAL LOCATION | Age: 69
End: 2024-09-10
Payer: COMMERCIAL

## 2024-09-10 DIAGNOSIS — F33.1 MODERATE EPISODE OF RECURRENT MAJOR DEPRESSIVE DISORDER: ICD-10-CM

## 2024-09-10 DIAGNOSIS — G47.00 INSOMNIA, UNSPECIFIED TYPE: ICD-10-CM

## 2024-09-10 DIAGNOSIS — Z98.1 STATUS POST LUMBAR AND LUMBOSACRAL FUSION BY ANTERIOR TECHNIQUE: ICD-10-CM

## 2024-09-10 DIAGNOSIS — R53.1 WEAKNESS: Primary | ICD-10-CM

## 2024-09-10 PROCEDURE — 99308 SBSQ NF CARE LOW MDM 20: CPT | Performed by: REGISTERED NURSE

## 2024-09-10 NOTE — LETTER
Patient: Johanne Acosta  : 1955    Encounter Date: 09/10/2024    PROGRESS NOTE    Subjective  Chief complaint: Johanne Acosta is a 69 y.o. female who is an acute skilled patient being seen and evaluated for weakness    HPI:  24 Patient has been working in therapy to improve strength, endurance, and ADLs.  Patient continues to work toward goals.  No new concerns today.  Denies n/v/f/c pain.      9/10/24 Patient presents for f/u therapy and general medical care.  Patient seen and examined at beside. No new concerns.    Objective  Vital signs: 112/56, 97.6, 94, 18, 105.0, 95%    Physical Exam  Constitutional:       General: She is not in acute distress.  Eyes:      Extraocular Movements: Extraocular movements intact.   Pulmonary:      Effort: Pulmonary effort is normal.   Musculoskeletal:      Cervical back: Neck supple.   Neurological:      Mental Status: She is alert.   Psychiatric:         Mood and Affect: Mood normal.         Behavior: Behavior is cooperative.         Assessment/Plan  Problem List Items Addressed This Visit       Insomnia     Melatonin          Major depressive disorder, recurrent, unspecified (CMS-HCC)     Continue current medications          Status post lumbar and lumbosacral fusion by anterior technique     Pain mgmt  Pt/ot   Pt having increase pain/discomfort   tramadol prn            Weakness - Primary     Continue pt/ot           Medications, treatments, and labs reviewed  Continue medications and treatments as listed in [facility]    Scribe Attestation  IGabby Scribe   attest that this documentation has been prepared under the direction and in the presence of LANDY Paredes.    Provider Attestation - Scribe documentation  All medical record entries made by the Scribe were at my direction and personally dictated by me. I have reviewed the chart and agree that the record accurately reflects my personal performance of the history, physical exam, discussion and  plan.    LANDY Paredes        Electronically Signed By: LANDY Paredes   9/17/24  5:24 PM

## 2024-09-10 NOTE — PROGRESS NOTES
PROGRESS NOTE    Subjective   Chief complaint: Johanne Acosta is a 69 y.o. female who is an acute skilled patient being seen and evaluated for weakness    HPI:  9/6/24 Patient admitted to SNF for therapy d/t weakness after recent hospitalization.   Patient requires assist with ADLs and transfers.  No new complaints.      Objective   Vital signs: 112/56, 97.6, 94, 18, 105.0, 95%    Physical Exam  Constitutional:       General: She is not in acute distress.  Eyes:      Extraocular Movements: Extraocular movements intact.   Pulmonary:      Effort: Pulmonary effort is normal.   Musculoskeletal:      Cervical back: Neck supple.   Neurological:      Mental Status: She is alert.   Psychiatric:         Mood and Affect: Mood normal.         Behavior: Behavior is cooperative.         Assessment/Plan   Problem List Items Addressed This Visit       Anxiety and depression     Continue current medications          GERD (gastroesophageal reflux disease)    Dementia with mood disturbance, unspecified dementia severity, unspecified dementia type (Multi)     Mentation at baseline          Status post lumbar and lumbosacral fusion by anterior technique - Primary     Pain mgmt  Pt/ot   Pt having increase pain/discomfort   States tylenol is not managing pain  Start tramadol prn            Weakness     Continue pt/ot           Medications, treatments, and labs reviewed  Continue medications and treatments as listed in [facility]    Scribe Attestation  IGabby Scribe   attest that this documentation has been prepared under the direction and in the presence of LANDY Paredes.    Provider Attestation - Scribe documentation  All medical record entries made by the Scribe were at my direction and personally dictated by me. I have reviewed the chart and agree that the record accurately reflects my personal performance of the history, physical exam, discussion and plan.    LANDY Paredes

## 2024-09-10 NOTE — PROGRESS NOTES
PROGRESS NOTE    Subjective   Chief complaint: Johanne Acosta is a 69 y.o. female who is an acute skilled patient being seen and evaluated for weakness    HPI:  9/9/24 Patient has been working in therapy to improve strength, endurance, and ADLs.  Patient continues to work toward goals.  No new concerns today.  Denies n/v/f/c pain.  Pt continues to put self on floor no acute injury.     Objective   Vital signs: 112/56, 97.6, 94, 18, 105.0, 95%    Physical Exam  Constitutional:       General: She is not in acute distress.  Eyes:      Extraocular Movements: Extraocular movements intact.   Pulmonary:      Effort: Pulmonary effort is normal.   Musculoskeletal:      Cervical back: Neck supple.   Neurological:      Mental Status: She is alert.   Psychiatric:         Mood and Affect: Mood normal.         Behavior: Behavior is cooperative.         Assessment/Plan   Problem List Items Addressed This Visit       Dementia with mood disturbance, unspecified dementia severity, unspecified dementia type (Multi)     Mentation at baseline          Moderate episode of recurrent major depressive disorder (Multi) (Chronic)     Mood stable   Continue current medications          Status post lumbar and lumbosacral fusion by anterior technique     Pain mgmt  Pt/ot   Pt having increase pain/discomfort   tramadol prn            Weakness - Primary     Continue pt/ot           Medications, treatments, and labs reviewed  Continue medications and treatments as listed in [facility]    Scribe Attestation  IGabby Scribe   attest that this documentation has been prepared under the direction and in the presence of LANDY Paredes.    Provider Attestation - Scribe documentation  All medical record entries made by the Scribe were at my direction and personally dictated by me. I have reviewed the chart and agree that the record accurately reflects my personal performance of the history, physical exam, discussion and  plan.    Saad Peñaloza, APRN-CNP

## 2024-09-11 ENCOUNTER — NURSING HOME VISIT (OUTPATIENT)
Dept: POST ACUTE CARE | Facility: EXTERNAL LOCATION | Age: 69
End: 2024-09-11
Payer: COMMERCIAL

## 2024-09-11 DIAGNOSIS — R10.32 LEFT GROIN PAIN: Primary | ICD-10-CM

## 2024-09-11 DIAGNOSIS — Z98.1 STATUS POST LUMBAR AND LUMBOSACRAL FUSION BY ANTERIOR TECHNIQUE: ICD-10-CM

## 2024-09-11 DIAGNOSIS — R53.1 WEAKNESS: ICD-10-CM

## 2024-09-11 DIAGNOSIS — G47.00 INSOMNIA, UNSPECIFIED TYPE: ICD-10-CM

## 2024-09-11 PROCEDURE — 99309 SBSQ NF CARE MODERATE MDM 30: CPT | Performed by: REGISTERED NURSE

## 2024-09-11 NOTE — LETTER
Patient: Johanne Acosta  : 1955    Encounter Date: 2024    PROGRESS NOTE    Subjective  Chief complaint: Johanne Acosta is a 69 y.o. female who is an acute skilled patient being seen and evaluated for weakness    HPI:  24 Patient has been working in therapy to improve strength, endurance, and ADLs.  Patient continues to work toward goals.  No new concerns today.  Denies n/v/f/c pain.      9/10/24 Patient presents for f/u therapy and general medical care.  Patient seen and examined at [facility]. No new concerns.    24 Patient has no new complaints or concerns today.  Continues working towards goals in therapy.  Denies constitutional symptoms.    Objective  Vital signs: 112/56, 97.6, 94, 18, 105.0, 95%    Physical Exam  Constitutional:       General: She is not in acute distress.  Eyes:      Extraocular Movements: Extraocular movements intact.   Pulmonary:      Effort: Pulmonary effort is normal.   Musculoskeletal:      Cervical back: Neck supple.   Neurological:      Mental Status: She is alert.   Psychiatric:         Mood and Affect: Mood normal.         Behavior: Behavior is cooperative.         Assessment/Plan  Problem List Items Addressed This Visit       Insomnia     Increase melatonin to 10mg at bedtime          Left groin pain - Primary     X ray          Status post lumbar and lumbosacral fusion by anterior technique     Pain mgmt  Pt/ot   tramadol prn            Weakness     Continue pt/ot           Medications, treatments, and labs reviewed  Continue medications and treatments as listed in [facility]    Scribe Attestation  Gabby REVELES Scribe   attest that this documentation has been prepared under the direction and in the presence of LANDY Paredes.    Provider Attestation - Scribe documentation  All medical record entries made by the Scribe were at my direction and personally dictated by me. I have reviewed the chart and agree that the record accurately reflects my personal  performance of the history, physical exam, discussion and plan.    LANDY Paredes        Electronically Signed By: LANDY Paredes   9/18/24  6:37 PM

## 2024-09-11 NOTE — PROGRESS NOTES
PROGRESS NOTE    Subjective   Chief complaint: Johanne Acosta is a 69 y.o. female who is an acute skilled patient being seen and evaluated for weakness    HPI:  9/9/24 Patient has been working in therapy to improve strength, endurance, and ADLs.  Patient continues to work toward goals.  No new concerns today.  Denies n/v/f/c pain.      9/10/24 Patient presents for f/u therapy and general medical care.  Patient seen and examined at beside. No new concerns.    Objective   Vital signs: 112/56, 97.6, 94, 18, 105.0, 95%    Physical Exam  Constitutional:       General: She is not in acute distress.  Eyes:      Extraocular Movements: Extraocular movements intact.   Pulmonary:      Effort: Pulmonary effort is normal.   Musculoskeletal:      Cervical back: Neck supple.   Neurological:      Mental Status: She is alert.   Psychiatric:         Mood and Affect: Mood normal.         Behavior: Behavior is cooperative.         Assessment/Plan   Problem List Items Addressed This Visit       Insomnia     Melatonin          Major depressive disorder, recurrent, unspecified (CMS-HCC)     Continue current medications          Status post lumbar and lumbosacral fusion by anterior technique     Pain mgmt  Pt/ot   Pt having increase pain/discomfort   tramadol prn            Weakness - Primary     Continue pt/ot           Medications, treatments, and labs reviewed  Continue medications and treatments as listed in [facility]    Scribe Attestation  IGabby Scribe   attest that this documentation has been prepared under the direction and in the presence of LANDY Paredes.    Provider Attestation - Scribe documentation  All medical record entries made by the Scribe were at my direction and personally dictated by me. I have reviewed the chart and agree that the record accurately reflects my personal performance of the history, physical exam, discussion and plan.    LANDY Paredes

## 2024-09-12 PROBLEM — R53.1 WEAKNESS: Status: ACTIVE | Noted: 2024-09-12

## 2024-09-12 NOTE — ASSESSMENT & PLAN NOTE
Pain mgmt  Pt/ot   Pt having increase pain/discomfort   States tylenol is not managing pain  Start tramadol prn

## 2024-09-12 NOTE — PROGRESS NOTES
PROGRESS NOTE    Subjective   Chief complaint: Johanne Acosta is a 69 y.o. female who is an acute skilled patient being seen and evaluated for weakness    HPI:  9/9/24 Patient has been working in therapy to improve strength, endurance, and ADLs.  Patient continues to work toward goals.  No new concerns today.  Denies n/v/f/c pain.      9/10/24 Patient presents for f/u therapy and general medical care.  Patient seen and examined at beside. No new concerns.    9/11/24 Patient has no new complaints or concerns today.  Continues working towards goals in therapy.  Denies constitutional symptoms.    Objective   Vital signs: 112/56, 97.6, 94, 18, 105.0, 95%    Physical Exam  Constitutional:       General: She is not in acute distress.  Eyes:      Extraocular Movements: Extraocular movements intact.   Pulmonary:      Effort: Pulmonary effort is normal.   Musculoskeletal:      Cervical back: Neck supple.   Neurological:      Mental Status: She is alert.   Psychiatric:         Mood and Affect: Mood normal.         Behavior: Behavior is cooperative.         Assessment/Plan   Problem List Items Addressed This Visit       Insomnia     Increase melatonin to 10mg at bedtime          Left groin pain - Primary     X ray          Status post lumbar and lumbosacral fusion by anterior technique     Pain mgmt  Pt/ot   tramadol prn            Weakness     Continue pt/ot           Medications, treatments, and labs reviewed  Continue medications and treatments as listed in [facility]    Scribe Attestation  IGabby Scribe   attest that this documentation has been prepared under the direction and in the presence of LANDY Paredes.    Provider Attestation - Scribe documentation  All medical record entries made by the Scribe were at my direction and personally dictated by me. I have reviewed the chart and agree that the record accurately reflects my personal performance of the history, physical exam, discussion and  plan.    Saad Peñaloza, APRN-CNP

## 2024-09-13 ENCOUNTER — NURSING HOME VISIT (OUTPATIENT)
Dept: POST ACUTE CARE | Facility: EXTERNAL LOCATION | Age: 69
End: 2024-09-13
Payer: COMMERCIAL

## 2024-09-13 DIAGNOSIS — F33.1 MODERATE EPISODE OF RECURRENT MAJOR DEPRESSIVE DISORDER: ICD-10-CM

## 2024-09-13 DIAGNOSIS — Z98.1 STATUS POST LUMBAR AND LUMBOSACRAL FUSION BY ANTERIOR TECHNIQUE: ICD-10-CM

## 2024-09-13 DIAGNOSIS — R53.1 WEAKNESS: Primary | ICD-10-CM

## 2024-09-13 PROCEDURE — 99308 SBSQ NF CARE LOW MDM 20: CPT | Performed by: REGISTERED NURSE

## 2024-09-13 NOTE — LETTER
Patient: Johanne Acosta  : 1955    Encounter Date: 2024    PROGRESS NOTE    Subjective  Chief complaint: Johanne Acosta is a 69 y.o. female who is an acute skilled patient being seen and evaluated for weakness    HPI:  24 Patient has been working in therapy to improve strength, endurance, and ADLs.  Patient continues to work toward goals.  No new concerns today.  Denies n/v/f/c pain.      9/10/24 Patient presents for f/u therapy and general medical care.  Patient seen and examined at beside. No new concerns.    24 Patient has no new complaints or concerns today.  Continues working towards goals in therapy.  Denies constitutional symptoms.    24 Patient presents for f/u therapy and general medical care.  Patient seen and examined at beside.  Therapy has been working with the patient to improve strength, endurance, ADLs, and transfers d/t generalized weakness.  Patient has no acute concerns today.    Objective  Vital signs: 126/70, 97.4, 83, 18, 105.0, 96%    Physical Exam  Constitutional:       General: She is not in acute distress.  Eyes:      Extraocular Movements: Extraocular movements intact.   Pulmonary:      Effort: Pulmonary effort is normal.   Musculoskeletal:      Cervical back: Neck supple.   Neurological:      Mental Status: She is alert.   Psychiatric:         Mood and Affect: Mood normal.         Behavior: Behavior is cooperative.         Assessment/Plan  Problem List Items Addressed This Visit       Major depressive disorder, recurrent, unspecified (CMS-HCC)     Continue current medications          Status post lumbar and lumbosacral fusion by anterior technique     Pain mgmt  Pt/ot   tramadol prn            Weakness - Primary     Continue pt/ot           Medications, treatments, and labs reviewed  Continue medications and treatments as listed in [facility]    Scribe Attestation  I, Nathaniel Reese   attest that this documentation has been prepared under the direction and in the  presence of LANDY Paredes.    Provider Attestation - Scribe documentation  All medical record entries made by the Scribe were at my direction and personally dictated by me. I have reviewed the chart and agree that the record accurately reflects my personal performance of the history, physical exam, discussion and plan.    LANDY Paredes        Electronically Signed By: LANDY Paredes   9/19/24  8:34 AM

## 2024-09-16 ENCOUNTER — NURSING HOME VISIT (OUTPATIENT)
Dept: POST ACUTE CARE | Facility: EXTERNAL LOCATION | Age: 69
End: 2024-09-16
Payer: COMMERCIAL

## 2024-09-16 DIAGNOSIS — F33.1 MODERATE EPISODE OF RECURRENT MAJOR DEPRESSIVE DISORDER: ICD-10-CM

## 2024-09-16 DIAGNOSIS — Z98.1 STATUS POST LUMBAR AND LUMBOSACRAL FUSION BY ANTERIOR TECHNIQUE: Primary | ICD-10-CM

## 2024-09-16 DIAGNOSIS — R53.1 WEAKNESS: ICD-10-CM

## 2024-09-16 PROCEDURE — 99308 SBSQ NF CARE LOW MDM 20: CPT | Performed by: REGISTERED NURSE

## 2024-09-16 NOTE — LETTER
Patient: Johanne Acosta  : 1955    Encounter Date: 2024    PROGRESS NOTE    Subjective  Chief complaint: Johanne Acosta is a 69 y.o. female who is an acute skilled patient being seen and evaluated for weakness    HPI:  24 Patient has been working in therapy to improve strength, endurance, and ADLs.  Patient continues to work toward goals.  No new concerns today.  Denies n/v/f/c pain.      9/10/24 Patient presents for f/u therapy and general medical care.  Patient seen and examined at beside. No new concerns.    24 Patient has no new complaints or concerns today.  Continues working towards goals in therapy.  Denies constitutional symptoms.    24 Patient presents for f/u therapy and general medical care.  Patient seen and examined at beside.  Therapy has been working with the patient to improve strength, endurance, ADLs, and transfers d/t generalized weakness.  Patient has no acute concerns today.    24 Patient seen and examined at bedside.  Patient denies n/v/f/c.  Continues working in therapy.  No new complaints.    Objective  Vital signs: 126/70, 97.4, 83, 18, 105.0, 96%    Physical Exam  Constitutional:       General: She is not in acute distress.  Eyes:      Extraocular Movements: Extraocular movements intact.   Pulmonary:      Effort: Pulmonary effort is normal.   Musculoskeletal:      Cervical back: Neck supple.   Neurological:      Mental Status: She is alert.   Psychiatric:         Mood and Affect: Mood normal.         Behavior: Behavior is cooperative.         Assessment/Plan  Problem List Items Addressed This Visit       Major depressive disorder, recurrent, unspecified (CMS-HCC)     Continue current medications          Status post lumbar and lumbosacral fusion by anterior technique - Primary     Pain mgmt  Pt/ot   tramadol prn            Weakness     Continue pt/ot           Medications, treatments, and labs reviewed  Continue medications and treatments as listed in  [facility]    Scribe Attestation  I, Nathaniel Reese   attest that this documentation has been prepared under the direction and in the presence of LANDY Paredes.    Provider Attestation - Scribe documentation  All medical record entries made by the Scribe were at my direction and personally dictated by me. I have reviewed the chart and agree that the record accurately reflects my personal performance of the history, physical exam, discussion and plan.    LANDY Paredes        Electronically Signed By: LANDY Paredes   9/23/24  4:44 PM

## 2024-09-17 ENCOUNTER — APPOINTMENT (OUTPATIENT)
Dept: PODIATRY | Facility: CLINIC | Age: 69
End: 2024-09-17
Payer: COMMERCIAL

## 2024-09-17 ENCOUNTER — NURSING HOME VISIT (OUTPATIENT)
Dept: POST ACUTE CARE | Facility: EXTERNAL LOCATION | Age: 69
End: 2024-09-17

## 2024-09-17 DIAGNOSIS — Z98.1 STATUS POST LUMBAR AND LUMBOSACRAL FUSION BY ANTERIOR TECHNIQUE: ICD-10-CM

## 2024-09-17 DIAGNOSIS — R53.1 WEAKNESS: Primary | ICD-10-CM

## 2024-09-17 PROCEDURE — 99308 SBSQ NF CARE LOW MDM 20: CPT | Performed by: REGISTERED NURSE

## 2024-09-17 NOTE — LETTER
Patient: Johanne Acosta  : 1955    Encounter Date: 2024    PROGRESS NOTE    Subjective  Chief complaint: Johanne Acosta is a 69 y.o. female who is an acute skilled patient being seen and evaluated for weakness    HPI:  24 Patient has been working in therapy to improve strength, endurance, and ADLs.  Patient continues to work toward goals.  No new concerns today.  Denies n/v/f/c pain.      9/10/24 Patient presents for f/u therapy and general medical care.  Patient seen and examined at beside. No new concerns.    24 Patient has no new complaints or concerns today.  Continues working towards goals in therapy.  Denies constitutional symptoms.    24 Patient presents for f/u therapy and general medical care.  Patient seen and examined at beside.  Therapy has been working with the patient to improve strength, endurance, ADLs, and transfers d/t generalized weakness.  Patient has no acute concerns today.    24 Patient seen and examined at bedside.  Patient denies n/v/f/c.  Continues working in therapy.  No new complaints.    24 Patient has no new complaints or concerns today.  Patient is working in therapy.  Denies n/v/f/c.    Objective  Vital signs: 120/74, 98, 85, 16, 105.0, 93%    Physical Exam  Constitutional:       General: She is not in acute distress.  Eyes:      Extraocular Movements: Extraocular movements intact.   Pulmonary:      Effort: Pulmonary effort is normal.   Musculoskeletal:      Cervical back: Neck supple.   Neurological:      Mental Status: She is alert.   Psychiatric:         Mood and Affect: Mood normal.         Behavior: Behavior is cooperative.         Assessment/Plan  Problem List Items Addressed This Visit       Status post lumbar and lumbosacral fusion by anterior technique     Pain mgmt  Pt/ot   tramadol prn            Weakness - Primary     Continue pt/ot           Medications, treatments, and labs reviewed  Continue medications and treatments as listed in  [facility]    Scribe Attestation  I, Nathaniel Reese   attest that this documentation has been prepared under the direction and in the presence of LANDY Paredes.    Provider Attestation - Scribe documentation  All medical record entries made by the Scribe were at my direction and personally dictated by me. I have reviewed the chart and agree that the record accurately reflects my personal performance of the history, physical exam, discussion and plan.    LANDY Paredes        Electronically Signed By: LANDY Paredes   9/24/24  7:09 PM

## 2024-09-17 NOTE — PROGRESS NOTES
PROGRESS NOTE    Subjective   Chief complaint: Johanne Acosta is a 69 y.o. female who is an acute skilled patient being seen and evaluated for weakness    HPI:  9/9/24 Patient has been working in therapy to improve strength, endurance, and ADLs.  Patient continues to work toward goals.  No new concerns today.  Denies n/v/f/c pain.      9/10/24 Patient presents for f/u therapy and general medical care.  Patient seen and examined at beside. No new concerns.    9/11/24 Patient has no new complaints or concerns today.  Continues working towards goals in therapy.  Denies constitutional symptoms.    9/13/24 Patient presents for f/u therapy and general medical care.  Patient seen and examined at beside.  Therapy has been working with the patient to improve strength, endurance, ADLs, and transfers d/t generalized weakness.  Patient has no acute concerns today.    Objective   Vital signs: 126/70, 97.4, 83, 18, 105.0, 96%    Physical Exam  Constitutional:       General: She is not in acute distress.  Eyes:      Extraocular Movements: Extraocular movements intact.   Pulmonary:      Effort: Pulmonary effort is normal.   Musculoskeletal:      Cervical back: Neck supple.   Neurological:      Mental Status: She is alert.   Psychiatric:         Mood and Affect: Mood normal.         Behavior: Behavior is cooperative.         Assessment/Plan   Problem List Items Addressed This Visit       Major depressive disorder, recurrent, unspecified (CMS-HCC)     Continue current medications          Status post lumbar and lumbosacral fusion by anterior technique     Pain mgmt  Pt/ot   tramadol prn            Weakness - Primary     Continue pt/ot           Medications, treatments, and labs reviewed  Continue medications and treatments as listed in [facility]    Scribe Attestation  I, Nathaniel eRese   attest that this documentation has been prepared under the direction and in the presence of LANDY Paredes.    Provider Attestation  - Scribe documentation  All medical record entries made by the Scribe were at my direction and personally dictated by me. I have reviewed the chart and agree that the record accurately reflects my personal performance of the history, physical exam, discussion and plan.    Saad Peñaloza, APRN-CNP

## 2024-09-17 NOTE — PROGRESS NOTES
PROGRESS NOTE    Subjective   Chief complaint: Johanne Acosta is a 69 y.o. female who is an acute skilled patient being seen and evaluated for weakness    HPI:  9/9/24 Patient has been working in therapy to improve strength, endurance, and ADLs.  Patient continues to work toward goals.  No new concerns today.  Denies n/v/f/c pain.      9/10/24 Patient presents for f/u therapy and general medical care.  Patient seen and examined at beside. No new concerns.    9/11/24 Patient has no new complaints or concerns today.  Continues working towards goals in therapy.  Denies constitutional symptoms.    9/13/24 Patient presents for f/u therapy and general medical care.  Patient seen and examined at beside.  Therapy has been working with the patient to improve strength, endurance, ADLs, and transfers d/t generalized weakness.  Patient has no acute concerns today.    9/16/24 Patient seen and examined at bedside.  Patient denies n/v/f/c.  Continues working in therapy.  No new complaints.    Objective   Vital signs: 126/70, 97.4, 83, 18, 105.0, 96%    Physical Exam  Constitutional:       General: She is not in acute distress.  Eyes:      Extraocular Movements: Extraocular movements intact.   Pulmonary:      Effort: Pulmonary effort is normal.   Musculoskeletal:      Cervical back: Neck supple.   Neurological:      Mental Status: She is alert.   Psychiatric:         Mood and Affect: Mood normal.         Behavior: Behavior is cooperative.         Assessment/Plan   Problem List Items Addressed This Visit       Major depressive disorder, recurrent, unspecified (CMS-HCC)     Continue current medications          Status post lumbar and lumbosacral fusion by anterior technique - Primary     Pain mgmt  Pt/ot   tramadol prn            Weakness     Continue pt/ot           Medications, treatments, and labs reviewed  Continue medications and treatments as listed in [facility]    Scribe Attestation  I, Nathaniel Reese   attest that this  documentation has been prepared under the direction and in the presence of LANDY Paredes.    Provider Attestation - Scribe documentation  All medical record entries made by the Scribe were at my direction and personally dictated by me. I have reviewed the chart and agree that the record accurately reflects my personal performance of the history, physical exam, discussion and plan.    LANDY Paredes

## 2024-09-18 ENCOUNTER — NURSING HOME VISIT (OUTPATIENT)
Dept: POST ACUTE CARE | Facility: EXTERNAL LOCATION | Age: 69
End: 2024-09-18
Payer: COMMERCIAL

## 2024-09-18 DIAGNOSIS — Z98.1 STATUS POST LUMBAR AND LUMBOSACRAL FUSION BY ANTERIOR TECHNIQUE: ICD-10-CM

## 2024-09-18 DIAGNOSIS — F03.93 DEMENTIA WITH MOOD DISTURBANCE, UNSPECIFIED DEMENTIA SEVERITY, UNSPECIFIED DEMENTIA TYPE (MULTI): ICD-10-CM

## 2024-09-18 DIAGNOSIS — R53.1 WEAKNESS: Primary | ICD-10-CM

## 2024-09-18 PROCEDURE — 99308 SBSQ NF CARE LOW MDM 20: CPT | Performed by: REGISTERED NURSE

## 2024-09-18 NOTE — LETTER
Patient: Johanne Acosta  : 1955    Encounter Date: 2024    PROGRESS NOTE    Subjective  Chief complaint: Johanne Acosta is a 69 y.o. female who is an acute skilled patient being seen and evaluated for weakness    HPI:  24 Patient has no new complaints or concerns today.  Continues working towards goals in therapy.  Denies constitutional symptoms.    Objective  Vital signs: 122/85, 96.7, 91, 18, 105.0, 97%    Physical Exam  Constitutional:       General: She is not in acute distress.  Eyes:      Extraocular Movements: Extraocular movements intact.   Pulmonary:      Effort: Pulmonary effort is normal.   Musculoskeletal:      Cervical back: Neck supple.   Neurological:      Mental Status: She is alert.   Psychiatric:         Mood and Affect: Mood normal.         Behavior: Behavior is cooperative.         Assessment/Plan  Problem List Items Addressed This Visit       Dementia with mood disturbance, unspecified dementia severity, unspecified dementia type (Multi)     Mentation at baseline          Status post lumbar and lumbosacral fusion by anterior technique     Pain mgmt  Pt/ot   tramadol prn            Weakness - Primary     Continue pt/ot           Medications, treatments, and labs reviewed  Continue medications and treatments as listed in [facility]    Scribe Attestation  I, Nathaniel Reese   attest that this documentation has been prepared under the direction and in the presence of LANDY Paredes.    Provider Attestation - Scribe documentation  All medical record entries made by the Scribe were at my direction and personally dictated by me. I have reviewed the chart and agree that the record accurately reflects my personal performance of the history, physical exam, discussion and plan.    LANDY Paredes        Electronically Signed By: LANDY Paredes   24  7:13 PM

## 2024-09-18 NOTE — PROGRESS NOTES
PROGRESS NOTE    Subjective   Chief complaint: Johanne Acosta is a 69 y.o. female who is an acute skilled patient being seen and evaluated for weakness    HPI:  9/9/24 Patient has been working in therapy to improve strength, endurance, and ADLs.  Patient continues to work toward goals.  No new concerns today.  Denies n/v/f/c pain.      9/10/24 Patient presents for f/u therapy and general medical care.  Patient seen and examined at beside. No new concerns.    9/11/24 Patient has no new complaints or concerns today.  Continues working towards goals in therapy.  Denies constitutional symptoms.    9/13/24 Patient presents for f/u therapy and general medical care.  Patient seen and examined at beside.  Therapy has been working with the patient to improve strength, endurance, ADLs, and transfers d/t generalized weakness.  Patient has no acute concerns today.    9/16/24 Patient seen and examined at bedside.  Patient denies n/v/f/c.  Continues working in therapy.  No new complaints.    9/17/24 Patient has no new complaints or concerns today.  Patient is working in therapy.  Denies n/v/f/c.    Objective   Vital signs: 120/74, 98, 85, 16, 105.0, 93%    Physical Exam  Constitutional:       General: She is not in acute distress.  Eyes:      Extraocular Movements: Extraocular movements intact.   Pulmonary:      Effort: Pulmonary effort is normal.   Musculoskeletal:      Cervical back: Neck supple.   Neurological:      Mental Status: She is alert.   Psychiatric:         Mood and Affect: Mood normal.         Behavior: Behavior is cooperative.         Assessment/Plan   Problem List Items Addressed This Visit       Status post lumbar and lumbosacral fusion by anterior technique     Pain mgmt  Pt/ot   tramadol prn            Weakness - Primary     Continue pt/ot           Medications, treatments, and labs reviewed  Continue medications and treatments as listed in [facility]    Scribe Attestation  I, Nathaniel Reese   attest that this  documentation has been prepared under the direction and in the presence of LANDY Paredes.    Provider Attestation - Scribe documentation  All medical record entries made by the Scribe were at my direction and personally dictated by me. I have reviewed the chart and agree that the record accurately reflects my personal performance of the history, physical exam, discussion and plan.    LANDY Paredes

## 2024-09-19 NOTE — PROGRESS NOTES
PROGRESS NOTE    Subjective   Chief complaint: Johanne Acosta is a 69 y.o. female who is an acute skilled patient being seen and evaluated for weakness    HPI:  9/18/24 Patient has no new complaints or concerns today.  Continues working towards goals in therapy.  Denies constitutional symptoms.    Objective   Vital signs: 122/85, 96.7, 91, 18, 105.0, 97%    Physical Exam  Constitutional:       General: She is not in acute distress.  Eyes:      Extraocular Movements: Extraocular movements intact.   Pulmonary:      Effort: Pulmonary effort is normal.   Musculoskeletal:      Cervical back: Neck supple.   Neurological:      Mental Status: She is alert.   Psychiatric:         Mood and Affect: Mood normal.         Behavior: Behavior is cooperative.         Assessment/Plan   Problem List Items Addressed This Visit       Dementia with mood disturbance, unspecified dementia severity, unspecified dementia type (Multi)     Mentation at baseline          Status post lumbar and lumbosacral fusion by anterior technique     Pain mgmt  Pt/ot   tramadol prn            Weakness - Primary     Continue pt/ot           Medications, treatments, and labs reviewed  Continue medications and treatments as listed in [facility]    Scribe Attestation  IGabby Scribe   attest that this documentation has been prepared under the direction and in the presence of LANDY Paredes.    Provider Attestation - Scribe documentation  All medical record entries made by the Scribe were at my direction and personally dictated by me. I have reviewed the chart and agree that the record accurately reflects my personal performance of the history, physical exam, discussion and plan.    LANDY Paredes

## 2024-09-20 ENCOUNTER — OFFICE VISIT (OUTPATIENT)
Facility: CLINIC | Age: 69
End: 2024-09-20
Payer: COMMERCIAL

## 2024-09-20 ENCOUNTER — NURSING HOME VISIT (OUTPATIENT)
Dept: POST ACUTE CARE | Facility: EXTERNAL LOCATION | Age: 69
End: 2024-09-20

## 2024-09-20 VITALS
WEIGHT: 179 LBS | BODY MASS INDEX: 31.71 KG/M2 | SYSTOLIC BLOOD PRESSURE: 108 MMHG | HEIGHT: 63 IN | HEART RATE: 67 BPM | DIASTOLIC BLOOD PRESSURE: 70 MMHG

## 2024-09-20 DIAGNOSIS — Z98.1 STATUS POST LUMBAR AND LUMBOSACRAL FUSION BY ANTERIOR TECHNIQUE: ICD-10-CM

## 2024-09-20 DIAGNOSIS — R53.1 WEAKNESS: Primary | ICD-10-CM

## 2024-09-20 DIAGNOSIS — R29.898 WEAKNESS OF BOTH LOWER EXTREMITIES: Primary | ICD-10-CM

## 2024-09-20 DIAGNOSIS — Z98.1 STATUS POST LUMBAR SPINAL FUSION: ICD-10-CM

## 2024-09-20 DIAGNOSIS — F33.1 MODERATE EPISODE OF RECURRENT MAJOR DEPRESSIVE DISORDER: ICD-10-CM

## 2024-09-20 DIAGNOSIS — F03.93 DEMENTIA WITH MOOD DISTURBANCE, UNSPECIFIED DEMENTIA SEVERITY, UNSPECIFIED DEMENTIA TYPE (MULTI): ICD-10-CM

## 2024-09-20 PROCEDURE — 99308 SBSQ NF CARE LOW MDM 20: CPT | Performed by: REGISTERED NURSE

## 2024-09-20 ASSESSMENT — ENCOUNTER SYMPTOMS
OCCASIONAL FEELINGS OF UNSTEADINESS: 1
DEPRESSION: 1

## 2024-09-20 ASSESSMENT — PATIENT HEALTH QUESTIONNAIRE - PHQ9
1. LITTLE INTEREST OR PLEASURE IN DOING THINGS: SEVERAL DAYS
2. FEELING DOWN, DEPRESSED OR HOPELESS: SEVERAL DAYS
SUM OF ALL RESPONSES TO PHQ9 QUESTIONS 1 & 2: 2
SUM OF ALL RESPONSES TO PHQ9 QUESTIONS 1 & 2: 2
2. FEELING DOWN, DEPRESSED OR HOPELESS: SEVERAL DAYS
2. FEELING DOWN, DEPRESSED OR HOPELESS: SEVERAL DAYS
1. LITTLE INTEREST OR PLEASURE IN DOING THINGS: SEVERAL DAYS
1. LITTLE INTEREST OR PLEASURE IN DOING THINGS: SEVERAL DAYS
SUM OF ALL RESPONSES TO PHQ9 QUESTIONS 1 AND 2: 2
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
## 2024-09-20 NOTE — LETTER
Patient: Johanne Acosta  : 1955    Encounter Date: 2024    PROGRESS NOTE    Subjective  Chief complaint: Johanne Acosta is a 69 y.o. female who is an acute skilled patient being seen and evaluated for weakness    HPI:  24 Patient has no new complaints or concerns today.  Continues working towards goals in therapy.  Denies constitutional symptoms.    24 Therapy has been working with the patient to improve strength and endurance with ADLs, transfers, and mobility.  Patient continues to work toward goals.  Patient is stable and has no new complaints.  Nursing staff voices no new concerns today.    Objective  Vital signs: 139/79, 96.8, 91, 18, 105.0, 97%    Physical Exam  Constitutional:       General: She is not in acute distress.  Eyes:      Extraocular Movements: Extraocular movements intact.   Pulmonary:      Effort: Pulmonary effort is normal.   Musculoskeletal:      Cervical back: Neck supple.   Neurological:      Mental Status: She is alert.   Psychiatric:         Mood and Affect: Mood normal.         Behavior: Behavior is cooperative.         Assessment/Plan  Problem List Items Addressed This Visit       Major depressive disorder, recurrent, unspecified (CMS-HCC)     Continue current medications          Dementia with mood disturbance, unspecified dementia severity, unspecified dementia type (Multi)     Mentation at baseline          Status post lumbar and lumbosacral fusion by anterior technique     Pain mgmt  Pt/ot   tramadol            Weakness - Primary     Continue pt/ot           Medications, treatments, and labs reviewed  Continue medications and treatments as listed in [facility]    Scribe Attestation  I, Nathaniel Reese   attest that this documentation has been prepared under the direction and in the presence of LANDY Paredes.    Provider Attestation - Scribe documentation  All medical record entries made by the Scribe were at my direction and personally dictated by  me. I have reviewed the chart and agree that the record accurately reflects my personal performance of the history, physical exam, discussion and plan.    LANDY Paredes        Electronically Signed By: LANDY Paredes   9/26/24  9:06 AM

## 2024-09-23 ENCOUNTER — NURSING HOME VISIT (OUTPATIENT)
Dept: POST ACUTE CARE | Facility: EXTERNAL LOCATION | Age: 69
End: 2024-09-23
Payer: COMMERCIAL

## 2024-09-23 DIAGNOSIS — F03.93 DEMENTIA WITH MOOD DISTURBANCE, UNSPECIFIED DEMENTIA SEVERITY, UNSPECIFIED DEMENTIA TYPE (MULTI): ICD-10-CM

## 2024-09-23 DIAGNOSIS — R53.1 WEAKNESS: Primary | ICD-10-CM

## 2024-09-23 DIAGNOSIS — Z98.1 STATUS POST LUMBAR AND LUMBOSACRAL FUSION BY ANTERIOR TECHNIQUE: ICD-10-CM

## 2024-09-23 PROCEDURE — 99308 SBSQ NF CARE LOW MDM 20: CPT | Performed by: INTERNAL MEDICINE

## 2024-09-23 NOTE — LETTER
Patient: Johanne Acosta  : 1955    Encounter Date: 2024    PROGRESS NOTE    Subjective  Chief complaint: Johanne Acosta is a 69 y.o. female who is an acute skilled patient being seen and evaluated for weakness    HPI:  24 Patient has no new complaints or concerns today.  Continues working towards goals in therapy.  Denies constitutional symptoms.    24 Therapy has been working with the patient to improve strength and endurance with ADLs, transfers, and mobility.  Patient continues to work toward goals.  Patient is stable and has no new complaints.  Nursing staff voices no new concerns today.    2024 Patient in therapy d/t generalized weakness.  Patient presents for f/u.  Continues to work toward goals in therapy.  No new complaints at this time.          Objective  Vital signs: 101/58, 97.9, 90, 18, 96%    Physical Exam  Constitutional:       General: She is not in acute distress.  Eyes:      Extraocular Movements: Extraocular movements intact.   Cardiovascular:      Rate and Rhythm: Normal rate and regular rhythm.   Pulmonary:      Effort: Pulmonary effort is normal.      Breath sounds: Normal breath sounds.   Abdominal:      General: Bowel sounds are normal.      Palpations: Abdomen is soft.   Musculoskeletal:      Cervical back: Neck supple.      Right lower leg: No edema.      Left lower leg: No edema.   Neurological:      Mental Status: She is alert.      Motor: Weakness present.   Psychiatric:         Mood and Affect: Mood normal.         Assessment/Plan  Problem List Items Addressed This Visit       Dementia with mood disturbance, unspecified dementia severity, unspecified dementia type (Multi)     Mentation at baseline   Monitor mood         Status post lumbar and lumbosacral fusion by anterior technique     Pain mgmt  Pt/ot   tramadol            Weakness - Primary     Continue pt/ot           Medications, treatments, and labs reviewed  Continue medications and treatments as listed in  [facility]      Scribe Attestation  I, Nathaniel Rao   attest that this documentation has been prepared under the direction and in the presence of Saad Gonzalez DO    Provider Attestation - Scribe documentation  All medical record entries made by the Scribe were at my direction and personally dictated by me. I have reviewed the chart and agree that the record accurately reflects my personal performance of the history, physical exam, discussion and plan.   Saad Gonzalez DO        Electronically Signed By: Saad Gonzalez DO   12/29/24  5:07 PM

## 2024-09-23 NOTE — ASSESSMENT & PLAN NOTE
Pain mgmt  Pt/ot   tramadol prn      [Normal] : soft, non-tender, non-distended, no masses palpated, no HSM and normal bowel sounds

## 2024-09-24 ENCOUNTER — TELEPHONE (OUTPATIENT)
Dept: NEUROSURGERY | Facility: CLINIC | Age: 69
End: 2024-09-24
Payer: COMMERCIAL

## 2024-09-24 NOTE — PROGRESS NOTES
PROGRESS NOTE    Subjective   Chief complaint: Johanne Acosta is a 69 y.o. female who is an acute skilled patient being seen and evaluated for weakness    HPI:  9/18/24 Patient has no new complaints or concerns today.  Continues working towards goals in therapy.  Denies constitutional symptoms.    9/20/24 Therapy has been working with the patient to improve strength and endurance with ADLs, transfers, and mobility.  Patient continues to work toward goals.  Patient is stable and has no new complaints.  Nursing staff voices no new concerns today.    Objective   Vital signs: 139/79, 96.8, 91, 18, 105.0, 97%    Physical Exam  Constitutional:       General: She is not in acute distress.  Eyes:      Extraocular Movements: Extraocular movements intact.   Pulmonary:      Effort: Pulmonary effort is normal.   Musculoskeletal:      Cervical back: Neck supple.   Neurological:      Mental Status: She is alert.   Psychiatric:         Mood and Affect: Mood normal.         Behavior: Behavior is cooperative.         Assessment/Plan   Problem List Items Addressed This Visit       Major depressive disorder, recurrent, unspecified (CMS-HCC)     Continue current medications          Dementia with mood disturbance, unspecified dementia severity, unspecified dementia type (Multi)     Mentation at baseline          Status post lumbar and lumbosacral fusion by anterior technique     Pain mgmt  Pt/ot   tramadol            Weakness - Primary     Continue pt/ot           Medications, treatments, and labs reviewed  Continue medications and treatments as listed in [facility]    Scribe Attestation  I, Nathaniel Reese   attest that this documentation has been prepared under the direction and in the presence of LANDY Paredes.    Provider Attestation - Scribe documentation  All medical record entries made by the Scribe were at my direction and personally dictated by me. I have reviewed the chart and agree that the record accurately  reflects my personal performance of the history, physical exam, discussion and plan.    Saad Peñaloza, APRN-CNP

## 2024-09-27 ENCOUNTER — NURSING HOME VISIT (OUTPATIENT)
Dept: POST ACUTE CARE | Facility: EXTERNAL LOCATION | Age: 69
End: 2024-09-27
Payer: COMMERCIAL

## 2024-09-27 DIAGNOSIS — Z98.1 STATUS POST LUMBAR AND LUMBOSACRAL FUSION BY ANTERIOR TECHNIQUE: ICD-10-CM

## 2024-09-27 DIAGNOSIS — F33.1 MODERATE EPISODE OF RECURRENT MAJOR DEPRESSIVE DISORDER: ICD-10-CM

## 2024-09-27 DIAGNOSIS — F03.93 DEMENTIA WITH MOOD DISTURBANCE, UNSPECIFIED DEMENTIA SEVERITY, UNSPECIFIED DEMENTIA TYPE (MULTI): ICD-10-CM

## 2024-09-27 DIAGNOSIS — F32.A ANXIETY AND DEPRESSION: ICD-10-CM

## 2024-09-27 DIAGNOSIS — F41.9 ANXIETY AND DEPRESSION: ICD-10-CM

## 2024-09-27 DIAGNOSIS — R53.1 WEAKNESS: Primary | ICD-10-CM

## 2024-09-27 PROCEDURE — 99309 SBSQ NF CARE MODERATE MDM 30: CPT | Performed by: REGISTERED NURSE

## 2024-09-27 NOTE — LETTER
Patient: Johanne Acosta  : 1955    Encounter Date: 2024    PROGRESS NOTE    Subjective  Chief complaint: Johanne Acosta is a 69 y.o. female patient being seen and evaluated for monthly general medical care and follow-up    HPI:  Patient presents for general medical care and f/u.  Patient seen and examined at bedside.  No issues per nursing.  Patient has no acute complaints.        Objective  Vital signs: 121/68, 96.5, 78, 18, 105.0, 97%    Physical Exam  Constitutional:       General: She is not in acute distress.  Eyes:      Extraocular Movements: Extraocular movements intact.   Pulmonary:      Effort: Pulmonary effort is normal.   Musculoskeletal:      Cervical back: Neck supple.   Neurological:      Mental Status: She is alert.   Psychiatric:         Mood and Affect: Mood normal.         Behavior: Behavior is cooperative.         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    Provider Attestation - Scribe documentation  All medical record entries made by the Scribe were at my direction and personally dictated by me. I have reviewed the chart and agree that the record accurately reflects my personal performance of the history, physical exam, discussion and plan.    Saad ALMANZAR  LANDY Peñaloza      Electronically Signed By: LANDY Paredes   10/3/24 10:40 AM

## 2024-10-01 ENCOUNTER — APPOINTMENT (OUTPATIENT)
Dept: NEUROSURGERY | Facility: CLINIC | Age: 69
End: 2024-10-01
Payer: COMMERCIAL

## 2024-10-01 NOTE — PROGRESS NOTES
PROGRESS NOTE    Subjective   Chief complaint: Johanne Acosta is a 69 y.o. female patient being seen and evaluated for monthly general medical care and follow-up    HPI:  Patient presents for general medical care and f/u.  Patient seen and examined at bedside.  No issues per nursing.  Patient has no acute complaints.        Objective   Vital signs: 121/68, 96.5, 78, 18, 105.0, 97%    Physical Exam  Constitutional:       General: She is not in acute distress.  Eyes:      Extraocular Movements: Extraocular movements intact.   Pulmonary:      Effort: Pulmonary effort is normal.   Musculoskeletal:      Cervical back: Neck supple.   Neurological:      Mental Status: She is alert.   Psychiatric:         Mood and Affect: Mood normal.         Behavior: Behavior is cooperative.         Assessment/Plan   Problem List Items Addressed This Visit       Anxiety and depression     Continue current medications          Major depressive disorder, recurrent, unspecified     Continue current medications          Dementia with mood disturbance, unspecified dementia severity, unspecified dementia type (Multi)     Mentation at baseline          Status post lumbar and lumbosacral fusion by anterior technique     Pain mgmt  Pt/ot   tramadol            Weakness - Primary     Continue pt/ot           Medications, treatments, and labs reviewed  Continue medications and treatments as listed in EMR    Scribe Attestation  I, Nathaniel Reese   attest that this documentation has been prepared under the direction and in the presence of LANDY Paredes.    Provider Attestation - Scribe documentation  All medical record entries made by the Scribe were at my direction and personally dictated by me. I have reviewed the chart and agree that the record accurately reflects my personal performance of the history, physical exam, discussion and plan.    LANDY Paredes

## 2024-10-09 ENCOUNTER — APPOINTMENT (OUTPATIENT)
Dept: PRIMARY CARE | Facility: CLINIC | Age: 69
End: 2024-10-09
Payer: COMMERCIAL

## 2024-10-19 DIAGNOSIS — E56.9 VITAMIN DEFICIENCY: ICD-10-CM

## 2024-10-23 RX ORDER — FOLIC ACID 1 MG/1
1 TABLET ORAL DAILY
Qty: 90 TABLET | Refills: 1 | Status: SHIPPED | OUTPATIENT
Start: 2024-10-23 | End: 2025-10-23

## 2024-10-29 ENCOUNTER — NURSING HOME VISIT (OUTPATIENT)
Dept: POST ACUTE CARE | Facility: EXTERNAL LOCATION | Age: 69
End: 2024-10-29
Payer: COMMERCIAL

## 2024-10-29 DIAGNOSIS — F03.93 DEMENTIA WITH MOOD DISTURBANCE, UNSPECIFIED DEMENTIA SEVERITY, UNSPECIFIED DEMENTIA TYPE (MULTI): ICD-10-CM

## 2024-10-29 DIAGNOSIS — F41.9 ANXIETY AND DEPRESSION: ICD-10-CM

## 2024-10-29 DIAGNOSIS — F33.1 MODERATE EPISODE OF RECURRENT MAJOR DEPRESSIVE DISORDER: ICD-10-CM

## 2024-10-29 DIAGNOSIS — R53.1 WEAKNESS: Primary | ICD-10-CM

## 2024-10-29 DIAGNOSIS — F32.A ANXIETY AND DEPRESSION: ICD-10-CM

## 2024-10-29 PROCEDURE — 99309 SBSQ NF CARE MODERATE MDM 30: CPT | Performed by: REGISTERED NURSE

## 2024-10-29 NOTE — LETTER
Patient: Johanne Acosta  : 1955    Encounter Date: 10/29/2024    PROGRESS NOTE    Subjective  Chief complaint: Johanne Acosta is a 69 y.o. female patient being seen and evaluated for monthly general medical care and follow-up    HPI:  Patient presents for general medical care and f/u.  Patient seen and examined at bedside.  No issues per nursing.  Patient has no acute complaints.        Objective  Vital signs: 149/93, 97.3, 78, 18, 105.0, 96%    Physical Exam  Constitutional:       General: She is not in acute distress.  Eyes:      Extraocular Movements: Extraocular movements intact.   Pulmonary:      Effort: Pulmonary effort is normal.   Musculoskeletal:      Cervical back: Neck supple.   Neurological:      Mental Status: She is alert.   Psychiatric:         Mood and Affect: Mood normal.         Behavior: Behavior is cooperative.         Assessment/Plan  Problem List Items Addressed This Visit       Anxiety and depression     Continue current medications          Major depressive disorder, recurrent, unspecified     Continue current medications          Dementia with mood disturbance, unspecified dementia severity, unspecified dementia type (Multi)     Mentation at baseline          Weakness - Primary     Continue pt/ot           Medications, treatments, and labs reviewed  Continue medications and treatments as listed in EMR    Scribe Attestation  I, Nathaniel Reese   attest that this documentation has been prepared under the direction and in the presence of LANDY Paredes.    Provider Attestation - Scribe documentation  All medical record entries made by the Scribe were at my direction and personally dictated by me. I have reviewed the chart and agree that the record accurately reflects my personal performance of the history, physical exam, discussion and plan.    LANDY Paredes      Electronically Signed By: LANDY Paredes   24  6:59 PM

## 2024-10-30 NOTE — PROGRESS NOTES
PROGRESS NOTE    Subjective   Chief complaint: Johanne Acosta is a 69 y.o. female patient being seen and evaluated for monthly general medical care and follow-up    HPI:  Patient presents for general medical care and f/u.  Patient seen and examined at bedside.  No issues per nursing.  Patient has no acute complaints.        Objective   Vital signs: 149/93, 97.3, 78, 18, 105.0, 96%    Physical Exam  Constitutional:       General: She is not in acute distress.  Eyes:      Extraocular Movements: Extraocular movements intact.   Pulmonary:      Effort: Pulmonary effort is normal.   Musculoskeletal:      Cervical back: Neck supple.   Neurological:      Mental Status: She is alert.   Psychiatric:         Mood and Affect: Mood normal.         Behavior: Behavior is cooperative.         Assessment/Plan   Problem List Items Addressed This Visit       Anxiety and depression     Continue current medications          Major depressive disorder, recurrent, unspecified     Continue current medications          Dementia with mood disturbance, unspecified dementia severity, unspecified dementia type (Multi)     Mentation at baseline          Weakness - Primary     Continue pt/ot           Medications, treatments, and labs reviewed  Continue medications and treatments as listed in EMR    Scribe Attestation  I, Nathaniel Reese   attest that this documentation has been prepared under the direction and in the presence of LANDY Paredes.    Provider Attestation - Scribe documentation  All medical record entries made by the Scribe were at my direction and personally dictated by me. I have reviewed the chart and agree that the record accurately reflects my personal performance of the history, physical exam, discussion and plan.    LANDY Paredes

## 2024-11-20 ENCOUNTER — NURSING HOME VISIT (OUTPATIENT)
Dept: POST ACUTE CARE | Facility: EXTERNAL LOCATION | Age: 69
End: 2024-11-20
Payer: COMMERCIAL

## 2024-11-20 DIAGNOSIS — F32.A ANXIETY AND DEPRESSION: Primary | ICD-10-CM

## 2024-11-20 DIAGNOSIS — F41.9 ANXIETY AND DEPRESSION: Primary | ICD-10-CM

## 2024-11-20 DIAGNOSIS — I10 PRIMARY HYPERTENSION: ICD-10-CM

## 2024-11-20 DIAGNOSIS — E03.9 HYPOTHYROIDISM, UNSPECIFIED TYPE: ICD-10-CM

## 2024-11-20 DIAGNOSIS — F03.93 DEMENTIA WITH MOOD DISTURBANCE, UNSPECIFIED DEMENTIA SEVERITY, UNSPECIFIED DEMENTIA TYPE (MULTI): ICD-10-CM

## 2024-11-20 DIAGNOSIS — K21.00 GASTROESOPHAGEAL REFLUX DISEASE WITH ESOPHAGITIS, UNSPECIFIED WHETHER HEMORRHAGE: ICD-10-CM

## 2024-11-20 DIAGNOSIS — E78.2 MIXED HYPERLIPIDEMIA: ICD-10-CM

## 2024-11-20 DIAGNOSIS — K12.2 CELLULITIS AND ABSCESS OF MOUTH: ICD-10-CM

## 2024-11-20 PROCEDURE — 99305 1ST NF CARE MODERATE MDM 35: CPT | Performed by: INTERNAL MEDICINE

## 2024-11-20 NOTE — LETTER
Patient: Johanne Acosta  : 1955    Encounter Date: 2024    HISTORY & PHYSICAL    Subjective  Chief complaint: Johanne Acosta is a 69 y.o. female who is a acute skilled care patient being seen and evaluated for multiple medical problems.  Patient presents for weakness    HPI:  Patient is a 69-year-old female with past medical history of hypertension, depression, hyperlipidemia, and recent back surgery.  Patient developed a small pimple on her jaw.  She tried to poke it but nothing came out.  She went to the ED where a CT showed cellulitis and local abscess of her jaw.  She was admitted for IV antibiotics.  She is evaluated by infectious disease and general surgery.  She was placed on Zyvox.  She was discharged back to her nursing facility to continue IV antibiotics.        Past Medical History:   Diagnosis Date   • Pain in right shoulder 2021    Right shoulder pain   • Personal history of other diseases of the musculoskeletal system and connective tissue 2021    History of low back pain   • Personal history of other diseases of the musculoskeletal system and connective tissue 2021    History of low back pain       No past surgical history on file.    Family History   Problem Relation Name Age of Onset   • Alcohol abuse Father     • Other (heart problems) Father     • Alcohol abuse Maternal Grandfather     • Lung cancer Maternal Grandfather         Social History     Socioeconomic History   • Marital status:    Tobacco Use   • Smoking status: Never   • Smokeless tobacco: Never   Vaping Use   • Vaping status: Never Used   Substance and Sexual Activity   • Alcohol use: Yes     Comment: Rarely   • Drug use: Never     Social Drivers of Health     Financial Resource Strain: Low Risk  (2024)    Overall Financial Resource Strain (CARDIA)    • Difficulty of Paying Living Expenses: Not very hard   Food Insecurity: Food Insecurity Present (2024)    Hunger Vital Sign    • Worried About  Running Out of Food in the Last Year: Sometimes true    • Ran Out of Food in the Last Year: Sometimes true   Transportation Needs: No Transportation Needs (8/31/2024)    PRAPARE - Transportation    • Lack of Transportation (Medical): No    • Lack of Transportation (Non-Medical): No   Physical Activity: Not on File (4/8/2024)    [facility]    Physical Activity    • Physical Activity: 0   Stress: Not at Risk (4/12/2024)    [facility] from [facility]    Stress    • Stress: 1   Social Connections: At Risk (4/12/2024)    Received from [facility]    Social Connections    • Connectedness: 2   Intimate Partner Violence: Not At Risk (9/25/2023)    [facility]    Humiliation, Afraid, Rape, and Kick questionnaire    • Fear of Current or Ex-Partner: No    • Emotionally Abused: No    • Physically Abused: No    • Sexually Abused: No   Housing Stability: Low Risk  (8/31/2024)    Housing Stability Vital Sign    • Unable to Pay for Housing in the Last Year: No    • Number of Times Moved in the Last Year: 1    • Homeless in the Last Year: No       Vital signs:  130/80, 97.78, 18, 80, 96%    Objective  Physical Exam    Assessment/Plan  Problem List Items Addressed This Visit       Anxiety and depression - Primary     Continue current medications         GERD (gastroesophageal reflux disease)     Continue current medications         Hyperlipemia     Continue current medications         Hypertension     Continue current medications         Hypothyroidism     Continue current medications         Dementia with mood disturbance, unspecified dementia severity, unspecified dementia type (Multi)     Continue current medications  Supportive care         Cellulitis and abscess of mouth     Continue IV antibiotics          Hospital records reviewed  Medications, treatments, and labs reviewed  Continue medications and treatments as listed in EMR  Discussed with nursing and therapy    Saad Gonzalez,      Scribe Attestation  IAnastasia Scribe   attest that this documentation has been prepared under the direction and in the presence of Saad Gonzalez DO    Provider Attestation - Scribe documentation  All medical record entries made by the Scribe were at my direction and personally dictated by me. I have reviewed the chart and agree that the record accurately reflects my personal performance of the history, physical exam, discussion and plan.      Electronically Signed By: Saad Gonzalez DO   12/2/24  4:12 PM

## 2024-11-26 ENCOUNTER — OFFICE VISIT (OUTPATIENT)
Facility: CLINIC | Age: 69
End: 2024-11-26
Payer: COMMERCIAL

## 2024-11-26 VITALS — BODY MASS INDEX: 34.38 KG/M2 | HEIGHT: 63 IN | WEIGHT: 194 LBS | TEMPERATURE: 97.1 F | HEART RATE: 74 BPM

## 2024-11-26 DIAGNOSIS — M47.22 CERVICAL SPONDYLOSIS WITH RADICULOPATHY: ICD-10-CM

## 2024-11-26 DIAGNOSIS — Z98.1 STATUS POST LUMBAR SPINAL FUSION: Primary | ICD-10-CM

## 2024-11-26 DIAGNOSIS — M54.16 LUMBAR RADICULOPATHY: ICD-10-CM

## 2024-11-26 DIAGNOSIS — M47.12 CERVICAL SPONDYLOSIS WITH MYELOPATHY: ICD-10-CM

## 2024-11-26 PROCEDURE — 99211 OFF/OP EST MAY X REQ PHY/QHP: CPT | Performed by: NEUROLOGICAL SURGERY

## 2024-11-26 RX ORDER — OXYCODONE AND ACETAMINOPHEN 5; 325 MG/1; MG/1
TABLET ORAL
COMMUNITY
Start: 2024-11-19

## 2024-11-26 RX ORDER — BUPROPION HYDROCHLORIDE 300 MG/1
TABLET ORAL
COMMUNITY
Start: 2024-11-14

## 2024-11-26 RX ORDER — TRAMADOL HYDROCHLORIDE 50 MG/1
TABLET ORAL
COMMUNITY
Start: 2024-11-11

## 2024-11-26 RX ORDER — GABAPENTIN 300 MG/1
300 CAPSULE ORAL 3 TIMES DAILY
Qty: 90 CAPSULE | Refills: 2 | Status: SHIPPED | OUTPATIENT
Start: 2024-11-26 | End: 2025-02-24

## 2024-11-26 RX ORDER — MELOXICAM 7.5 MG/1
TABLET ORAL
COMMUNITY
Start: 2024-11-20

## 2024-11-26 RX ORDER — LINEZOLID 600 MG/1
TABLET, FILM COATED ORAL
COMMUNITY
Start: 2024-11-20

## 2024-11-26 RX ORDER — BUPROPION HYDROCHLORIDE 150 MG/1
TABLET ORAL
COMMUNITY
Start: 2024-11-14

## 2024-11-26 RX ORDER — GABAPENTIN 100 MG/1
CAPSULE ORAL
COMMUNITY
Start: 2024-11-01

## 2024-11-26 RX ORDER — CHLORHEXIDINE GLUCONATE ORAL RINSE 1.2 MG/ML
SOLUTION DENTAL
COMMUNITY
Start: 2024-11-20

## 2024-11-26 RX ORDER — LIDOCAINE 50 MG/G
1 PATCH TOPICAL EVERY 12 HOURS
Qty: 20 PATCH | Refills: 2 | Status: SHIPPED | OUTPATIENT
Start: 2024-11-26 | End: 2025-02-24

## 2024-11-26 ASSESSMENT — PATIENT HEALTH QUESTIONNAIRE - PHQ9
SUM OF ALL RESPONSES TO PHQ9 QUESTIONS 1 & 2: 1
10. IF YOU CHECKED OFF ANY PROBLEMS, HOW DIFFICULT HAVE THESE PROBLEMS MADE IT FOR YOU TO DO YOUR WORK, TAKE CARE OF THINGS AT HOME, OR GET ALONG WITH OTHER PEOPLE: SOMEWHAT DIFFICULT
1. LITTLE INTEREST OR PLEASURE IN DOING THINGS: SEVERAL DAYS
2. FEELING DOWN, DEPRESSED OR HOPELESS: NOT AT ALL

## 2024-11-26 ASSESSMENT — PAIN SCALES - GENERAL: PAINLEVEL_OUTOF10: 6

## 2024-11-26 NOTE — PROGRESS NOTES
HISTORY & PHYSICAL    Subjective   Chief complaint: Johanne Acosta is a 69 y.o. female who is a acute skilled care patient being seen and evaluated for multiple medical problems.  Patient presents for weakness    HPI:  Patient is a 69-year-old female with past medical history of hypertension, depression, hyperlipidemia, and recent back surgery.  Patient developed a small pimple on her jaw.  She tried to poke it but nothing came out.  She went to the ED where a CT showed cellulitis and local abscess of her jaw.  She was admitted for IV antibiotics.  She is evaluated by infectious disease and general surgery.  She was placed on Zyvox.  She was discharged back to her nursing facility to continue IV antibiotics.        Past Medical History:   Diagnosis Date    Pain in right shoulder 04/20/2021    Right shoulder pain    Personal history of other diseases of the musculoskeletal system and connective tissue 04/20/2021    History of low back pain    Personal history of other diseases of the musculoskeletal system and connective tissue 01/21/2021    History of low back pain       No past surgical history on file.    Family History   Problem Relation Name Age of Onset    Alcohol abuse Father      Other (heart problems) Father      Alcohol abuse Maternal Grandfather      Lung cancer Maternal Grandfather         Social History     Socioeconomic History    Marital status:    Tobacco Use    Smoking status: Never    Smokeless tobacco: Never   Vaping Use    Vaping status: Never Used   Substance and Sexual Activity    Alcohol use: Yes     Comment: Rarely    Drug use: Never     Social Drivers of Health     Financial Resource Strain: Low Risk  (8/31/2024)    Overall Financial Resource Strain (CARDIA)     Difficulty of Paying Living Expenses: Not very hard   Food Insecurity: Food Insecurity Present (7/2/2024)    Hunger Vital Sign     Worried About Running Out of Food in the Last Year: Sometimes true     Ran Out of Food in the Last  Year: Sometimes true   Transportation Needs: No Transportation Needs (8/31/2024)    PRAPARE - Transportation     Lack of Transportation (Medical): No     Lack of Transportation (Non-Medical): No   Physical Activity: Not on File (4/8/2024)    [facility]    Physical Activity     Physical Activity: 0   Stress: Not at Risk (4/12/2024)    [facility]    Stress     Stress: 1   Social Connections: At Risk (4/12/2024)    Received from [facility]    Social Connections     Connectedness: 2   Intimate Partner Violence: Not At Risk (9/25/2023)    Received from [facility]    Humiliation, Afraid, Rape, and Kick questionnaire     Fear of Current or Ex-Partner: No     Emotionally Abused: No     Physically Abused: No     Sexually Abused: No   Housing Stability: Low Risk  (8/31/2024)    Housing Stability Vital Sign     Unable to Pay for Housing in the Last Year: No     Number of Times Moved in the Last Year: 1     Homeless in the Last Year: No       Vital signs:  130/80, 97.78, 18, 80, 96%    Objective   Physical Exam    Assessment/Plan   Problem List Items Addressed This Visit       Anxiety and depression - Primary     Continue current medications         GERD (gastroesophageal reflux disease)     Continue current medications         Hyperlipemia     Continue current medications         Hypertension     Continue current medications         Hypothyroidism     Continue current medications         Dementia with mood disturbance, unspecified dementia severity, unspecified dementia type (Multi)     Continue current medications  Supportive care         Cellulitis and abscess of mouth     Continue IV antibiotics          Hospital records reviewed  Medications, treatments, and labs reviewed  Continue medications and treatments as listed in EMR  Discussed with nursing and therapy    DO Nathaniel Smith Attestation  I, Anastasia Armstrong   attest that this documentation has been  prepared under the direction and in the presence of Saad Gonzalez DO    Provider Attestation - Scribe documentation  All medical record entries made by the Scribe were at my direction and personally dictated by me. I have reviewed the chart and agree that the record accurately reflects my personal performance of the history, physical exam, discussion and plan.
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
## 2024-11-27 ENCOUNTER — NURSING HOME VISIT (OUTPATIENT)
Dept: POST ACUTE CARE | Facility: EXTERNAL LOCATION | Age: 69
End: 2024-11-27
Payer: COMMERCIAL

## 2024-11-27 DIAGNOSIS — M48.02 DEGENERATIVE CERVICAL SPINAL STENOSIS: ICD-10-CM

## 2024-11-27 DIAGNOSIS — Z98.1 S/P SPINAL FUSION: Primary | ICD-10-CM

## 2024-11-27 DIAGNOSIS — M79.2 NEUROPATHIC PAIN: ICD-10-CM

## 2024-11-27 DIAGNOSIS — K21.00 GASTROESOPHAGEAL REFLUX DISEASE WITH ESOPHAGITIS, UNSPECIFIED WHETHER HEMORRHAGE: ICD-10-CM

## 2024-11-27 DIAGNOSIS — M47.22 CERVICAL SPONDYLOSIS WITH RADICULOPATHY: Primary | ICD-10-CM

## 2024-11-27 DIAGNOSIS — M47.12 CERVICAL SPONDYLOSIS WITH MYELOPATHY: ICD-10-CM

## 2024-11-27 DIAGNOSIS — F03.93 DEMENTIA WITH MOOD DISTURBANCE, UNSPECIFIED DEMENTIA SEVERITY, UNSPECIFIED DEMENTIA TYPE (MULTI): ICD-10-CM

## 2024-11-27 DIAGNOSIS — R53.1 WEAKNESS: Primary | ICD-10-CM

## 2024-11-27 DIAGNOSIS — I10 PRIMARY HYPERTENSION: ICD-10-CM

## 2024-11-27 PROCEDURE — 99309 SBSQ NF CARE MODERATE MDM 30: CPT | Performed by: REGISTERED NURSE

## 2024-11-27 NOTE — LETTER
Patient: Johanne Acosta  : 1955    Encounter Date: 2024    PROGRESS NOTE    Subjective  Chief complaint: Johanne Acosta is a 69 y.o. female patient being seen and evaluated for monthly general medical care and follow-up    HPI:  Patient presents for general medical care and f/u. Patient seen and examined at bedside. No issues per nursing. Patient has no acute complaints.        Objective  Vital signs: 137/82, 97, 90, 18, 105.0, 98%    Physical Exam  Constitutional:       General: She is not in acute distress.  Eyes:      Extraocular Movements: Extraocular movements intact.   Pulmonary:      Effort: Pulmonary effort is normal.   Musculoskeletal:      Cervical back: Neck supple.   Neurological:      Mental Status: She is alert.   Psychiatric:         Mood and Affect: Mood normal.         Behavior: Behavior is cooperative.         Assessment/Plan  Problem List Items Addressed This Visit       GERD (gastroesophageal reflux disease)     Continue current medications         Hypertension     Continue current medications         Neuropathic pain     Continue current medications          Dementia with mood disturbance, unspecified dementia severity, unspecified dementia type (Multi)     Continue current medications  Supportive care         Weakness - Primary     Continue pt/ot           Medications, treatments, and labs reviewed  Continue medications and treatments as listed in EMR    Scribe Attestation  IGabby Scribe   attest that this documentation has been prepared under the direction and in the presence of LANDY Paredes.    Provider Attestation - Scribe documentation  All medical record entries made by the Scribe were at my direction and personally dictated by me. I have reviewed the chart and agree that the record accurately reflects my personal performance of the history, physical exam, discussion and plan.    LANDY Paredes      Electronically Signed By:  Saad Peñaloza, APRN-CNP   12/4/24  5:58 PM

## 2024-12-02 PROBLEM — K12.2 CELLULITIS AND ABSCESS OF MOUTH: Status: ACTIVE | Noted: 2024-12-02

## 2024-12-03 NOTE — H&P (VIEW-ONLY)
PROGRESS NOTE    Subjective   Chief complaint: Johanne Acosta is a 69 y.o. female patient being seen and evaluated for monthly general medical care and follow-up    HPI:  Patient presents for general medical care and f/u. Patient seen and examined at bedside. No issues per nursing. Patient has no acute complaints.        Objective   Vital signs: 137/82, 97, 90, 18, 105.0, 98%    Physical Exam  Constitutional:       General: She is not in acute distress.  Eyes:      Extraocular Movements: Extraocular movements intact.   Pulmonary:      Effort: Pulmonary effort is normal.   Musculoskeletal:      Cervical back: Neck supple.   Neurological:      Mental Status: She is alert.   Psychiatric:         Mood and Affect: Mood normal.         Behavior: Behavior is cooperative.         Assessment/Plan   Problem List Items Addressed This Visit       GERD (gastroesophageal reflux disease)     Continue current medications         Hypertension     Continue current medications         Neuropathic pain     Continue current medications          Dementia with mood disturbance, unspecified dementia severity, unspecified dementia type (Multi)     Continue current medications  Supportive care         Weakness - Primary     Continue pt/ot           Medications, treatments, and labs reviewed  Continue medications and treatments as listed in EMR    Scribe Attestation  I, Nathaniel Reese   attest that this documentation has been prepared under the direction and in the presence of LANDY Paredes.    Provider Attestation - Scribe documentation  All medical record entries made by the Scribe were at my direction and personally dictated by me. I have reviewed the chart and agree that the record accurately reflects my personal performance of the history, physical exam, discussion and plan.    LANDY Paredes

## 2024-12-11 ENCOUNTER — NURSING HOME VISIT (OUTPATIENT)
Dept: POST ACUTE CARE | Facility: EXTERNAL LOCATION | Age: 69
End: 2024-12-11
Payer: COMMERCIAL

## 2024-12-11 DIAGNOSIS — G89.29 CHRONIC LOW BACK PAIN, UNSPECIFIED BACK PAIN LATERALITY, UNSPECIFIED WHETHER SCIATICA PRESENT: ICD-10-CM

## 2024-12-11 DIAGNOSIS — F03.93 DEMENTIA WITH MOOD DISTURBANCE, UNSPECIFIED DEMENTIA SEVERITY, UNSPECIFIED DEMENTIA TYPE (MULTI): Primary | ICD-10-CM

## 2024-12-11 DIAGNOSIS — M54.50 CHRONIC LOW BACK PAIN, UNSPECIFIED BACK PAIN LATERALITY, UNSPECIFIED WHETHER SCIATICA PRESENT: ICD-10-CM

## 2024-12-11 DIAGNOSIS — I10 PRIMARY HYPERTENSION: ICD-10-CM

## 2024-12-11 PROCEDURE — 99308 SBSQ NF CARE LOW MDM 20: CPT | Performed by: REGISTERED NURSE

## 2024-12-11 NOTE — LETTER
Patient: Johanne Acosta  : 1955    Encounter Date: 2024    PROGRESS NOTE    Subjective  Chief complaint: Johanne Acosta is a 69 y.o. female who is a long term care patient . being seen and evaluated for follow up.     HPI:  HPI Pt seen today for h and p needed for upcomming surgery   Objective  Vital signs: 146/75, 97.8, 75, 16, 105.0, 98%    Physical Exam  Constitutional:       General: She is not in acute distress.  Eyes:      Extraocular Movements: Extraocular movements intact.   Pulmonary:      Effort: Pulmonary effort is normal.   Musculoskeletal:      Cervical back: Neck supple.   Neurological:      Mental Status: She is alert.   Psychiatric:         Mood and Affect: Mood normal.         Behavior: Behavior is cooperative.       Assessment/Plan  Problem List Items Addressed This Visit       Hypertension     Continue current medications         Low back pain     Continue current medication   Pain mgmt          Dementia with mood disturbance, unspecified dementia severity, unspecified dementia type (Multi) - Primary     Continue current medications  Supportive care          Medications, treatments, and labs reviewed  Continue medications and treatments as listed in [facility]    Scribe Attestation  I, Nathaniel Reese   attest that this documentation has been prepared under the direction and in the presence of LANDY Paredes.    Provider Attestation - Scribe documentation  All medical record entries made by the Scribe were at my direction and personally dictated by me. I have reviewed the chart and agree that the record accurately reflects my personal performance of the history, physical exam, discussion and plan.    LANDY Paredes          Electronically Signed By: LANDY Paredes   24  3:20 PM

## 2024-12-12 ENCOUNTER — ANESTHESIA EVENT (OUTPATIENT)
Dept: OPERATING ROOM | Facility: HOSPITAL | Age: 69
End: 2024-12-12
Payer: COMMERCIAL

## 2024-12-13 ENCOUNTER — APPOINTMENT (OUTPATIENT)
Dept: RADIOLOGY | Facility: HOSPITAL | Age: 69
DRG: 472 | End: 2024-12-13
Payer: COMMERCIAL

## 2024-12-13 ENCOUNTER — ANESTHESIA (OUTPATIENT)
Dept: OPERATING ROOM | Facility: HOSPITAL | Age: 69
End: 2024-12-13
Payer: COMMERCIAL

## 2024-12-13 ENCOUNTER — HOSPITAL ENCOUNTER (INPATIENT)
Facility: HOSPITAL | Age: 69
LOS: 3 days | Discharge: NURSING, REHAB OR LONG-TERM CARE | DRG: 472 | End: 2024-12-16
Attending: NEUROLOGICAL SURGERY | Admitting: NEUROLOGICAL SURGERY
Payer: COMMERCIAL

## 2024-12-13 DIAGNOSIS — M47.12 CERVICAL SPONDYLOSIS WITH MYELOPATHY: ICD-10-CM

## 2024-12-13 DIAGNOSIS — M48.02 DEGENERATIVE CERVICAL SPINAL STENOSIS: ICD-10-CM

## 2024-12-13 DIAGNOSIS — M47.22 CERVICAL SPONDYLOSIS WITH RADICULOPATHY: Primary | ICD-10-CM

## 2024-12-13 DIAGNOSIS — M54.12 CERVICAL RADICULOPATHY: ICD-10-CM

## 2024-12-13 DIAGNOSIS — G89.18 ACUTE POSTOPERATIVE PAIN: ICD-10-CM

## 2024-12-13 DIAGNOSIS — Z98.1 STATUS POST LUMBAR AND LUMBOSACRAL FUSION BY ANTERIOR TECHNIQUE: ICD-10-CM

## 2024-12-13 DIAGNOSIS — G89.18 POSTOPERATIVE PAIN AFTER SPINAL SURGERY: ICD-10-CM

## 2024-12-13 PROBLEM — M54.10 RADICULOPATHY: Status: ACTIVE | Noted: 2024-12-13

## 2024-12-13 LAB
ABO GROUP (TYPE) IN BLOOD: NORMAL
ANION GAP BLDA CALCULATED.4IONS-SCNC: 10 MMO/L (ref 10–25)
ANTIBODY SCREEN: NORMAL
BASE EXCESS BLDA CALC-SCNC: -0.3 MMOL/L (ref -2–3)
BODY TEMPERATURE: 37 DEGREES CELSIUS
CA-I BLDA-SCNC: 1.23 MMOL/L (ref 1.1–1.33)
CHLORIDE BLDA-SCNC: 104 MMOL/L (ref 98–107)
GLUCOSE BLD MANUAL STRIP-MCNC: 139 MG/DL (ref 74–99)
GLUCOSE BLD MANUAL STRIP-MCNC: 140 MG/DL (ref 74–99)
GLUCOSE BLDA-MCNC: 139 MG/DL (ref 74–99)
HCO3 BLDA-SCNC: 25.4 MMOL/L (ref 22–26)
HCT VFR BLD EST: 36 % (ref 36–46)
HGB BLDA-MCNC: 11.9 G/DL (ref 12–16)
LACTATE BLDA-SCNC: 1.3 MMOL/L (ref 0.4–2)
OXYHGB MFR BLDA: 97.5 % (ref 94–98)
PCO2 BLDA: 45 MM HG (ref 38–42)
PH BLDA: 7.36 PH (ref 7.38–7.42)
PO2 BLDA: 130 MM HG (ref 85–95)
POTASSIUM BLDA-SCNC: 4.4 MMOL/L (ref 3.5–5.3)
RH FACTOR (ANTIGEN D): NORMAL
SAO2 % BLDA: 99 % (ref 94–100)
SODIUM BLDA-SCNC: 135 MMOL/L (ref 136–145)

## 2024-12-13 PROCEDURE — 2720000007 HC OR 272 NO HCPCS: Performed by: NEUROLOGICAL SURGERY

## 2024-12-13 PROCEDURE — 2500000004 HC RX 250 GENERAL PHARMACY W/ HCPCS (ALT 636 FOR OP/ED): Performed by: ANESTHESIOLOGY

## 2024-12-13 PROCEDURE — 3700000001 HC GENERAL ANESTHESIA TIME - INITIAL BASE CHARGE: Performed by: NEUROLOGICAL SURGERY

## 2024-12-13 PROCEDURE — 82947 ASSAY GLUCOSE BLOOD QUANT: CPT

## 2024-12-13 PROCEDURE — 2500000004 HC RX 250 GENERAL PHARMACY W/ HCPCS (ALT 636 FOR OP/ED): Performed by: STUDENT IN AN ORGANIZED HEALTH CARE EDUCATION/TRAINING PROGRAM

## 2024-12-13 PROCEDURE — 1200000002 HC GENERAL ROOM WITH TELEMETRY DAILY

## 2024-12-13 PROCEDURE — 2500000004 HC RX 250 GENERAL PHARMACY W/ HCPCS (ALT 636 FOR OP/ED): Performed by: NURSE ANESTHETIST, CERTIFIED REGISTERED

## 2024-12-13 PROCEDURE — 00NW0ZZ RELEASE CERVICAL SPINAL CORD, OPEN APPROACH: ICD-10-PCS | Performed by: NEUROLOGICAL SURGERY

## 2024-12-13 PROCEDURE — 3600000017 HC OR TIME - EACH INCREMENTAL 1 MINUTE - PROCEDURE LEVEL SIX: Performed by: NEUROLOGICAL SURGERY

## 2024-12-13 PROCEDURE — 2500000005 HC RX 250 GENERAL PHARMACY W/O HCPCS: Performed by: NEUROLOGICAL SURGERY

## 2024-12-13 PROCEDURE — 7100000001 HC RECOVERY ROOM TIME - INITIAL BASE CHARGE: Performed by: NEUROLOGICAL SURGERY

## 2024-12-13 PROCEDURE — 3600000018 HC OR TIME - INITIAL BASE CHARGE - PROCEDURE LEVEL SIX: Performed by: NEUROLOGICAL SURGERY

## 2024-12-13 PROCEDURE — C1762 CONN TISS, HUMAN(INC FASCIA): HCPCS | Performed by: NEUROLOGICAL SURGERY

## 2024-12-13 PROCEDURE — 2500000004 HC RX 250 GENERAL PHARMACY W/ HCPCS (ALT 636 FOR OP/ED): Performed by: NEUROLOGICAL SURGERY

## 2024-12-13 PROCEDURE — 2780000003 HC OR 278 NO HCPCS: Performed by: NEUROLOGICAL SURGERY

## 2024-12-13 PROCEDURE — C1889 IMPLANT/INSERT DEVICE, NOC: HCPCS | Performed by: NEUROLOGICAL SURGERY

## 2024-12-13 PROCEDURE — 82435 ASSAY OF BLOOD CHLORIDE: CPT

## 2024-12-13 PROCEDURE — 0RG20A0 FUSION OF 2 OR MORE CERVICAL VERTEBRAL JOINTS WITH INTERBODY FUSION DEVICE, ANTERIOR APPROACH, ANTERIOR COLUMN, OPEN APPROACH: ICD-10-PCS | Performed by: NEUROLOGICAL SURGERY

## 2024-12-13 PROCEDURE — C1713 ANCHOR/SCREW BN/BN,TIS/BN: HCPCS | Performed by: NEUROLOGICAL SURGERY

## 2024-12-13 PROCEDURE — 2500000005 HC RX 250 GENERAL PHARMACY W/O HCPCS: Performed by: ANESTHESIOLOGY

## 2024-12-13 PROCEDURE — 86901 BLOOD TYPING SEROLOGIC RH(D): CPT | Performed by: NEUROLOGICAL SURGERY

## 2024-12-13 PROCEDURE — 2500000005 HC RX 250 GENERAL PHARMACY W/O HCPCS: Performed by: NURSE ANESTHETIST, CERTIFIED REGISTERED

## 2024-12-13 PROCEDURE — 0RT30ZZ RESECTION OF CERVICAL VERTEBRAL DISC, OPEN APPROACH: ICD-10-PCS | Performed by: NEUROLOGICAL SURGERY

## 2024-12-13 PROCEDURE — 36415 COLL VENOUS BLD VENIPUNCTURE: CPT | Performed by: NEUROLOGICAL SURGERY

## 2024-12-13 PROCEDURE — 22552 ARTHRD ANT NTRBD CERVICAL EA: CPT | Performed by: NEUROLOGICAL SURGERY

## 2024-12-13 PROCEDURE — 22853 INSJ BIOMECHANICAL DEVICE: CPT | Performed by: NEUROLOGICAL SURGERY

## 2024-12-13 PROCEDURE — 7100000002 HC RECOVERY ROOM TIME - EACH INCREMENTAL 1 MINUTE: Performed by: NEUROLOGICAL SURGERY

## 2024-12-13 PROCEDURE — 01N10ZZ RELEASE CERVICAL NERVE, OPEN APPROACH: ICD-10-PCS | Performed by: NEUROLOGICAL SURGERY

## 2024-12-13 PROCEDURE — 22846 INSERT SPINE FIXATION DEVICE: CPT | Performed by: NEUROLOGICAL SURGERY

## 2024-12-13 PROCEDURE — 22551 ARTHRD ANT NTRBDY CERVICAL: CPT | Performed by: NEUROLOGICAL SURGERY

## 2024-12-13 PROCEDURE — 3700000002 HC GENERAL ANESTHESIA TIME - EACH INCREMENTAL 1 MINUTE: Performed by: NEUROLOGICAL SURGERY

## 2024-12-13 DEVICE — IMPLANTABLE DEVICE: Type: IMPLANTABLE DEVICE | Site: NECK | Status: FUNCTIONAL

## 2024-12-13 DEVICE — MODULUS CERVICAL, 7X17X14MM 10°
Type: IMPLANTABLE DEVICE | Site: NECK | Status: FUNCTIONAL
Brand: MODULUS

## 2024-12-13 DEVICE — SCREW, ACP, SELF DRILL, 3.5 X 17MM, VARIABLE: Type: IMPLANTABLE DEVICE | Site: NECK | Status: FUNCTIONAL

## 2024-12-13 DEVICE — BONE MATRIX, OSTEOCEL, PRO CELLULAR, SMALL: Type: IMPLANTABLE DEVICE | Site: NECK | Status: FUNCTIONAL

## 2024-12-13 RX ORDER — HYDROMORPHONE HYDROCHLORIDE 1 MG/ML
INJECTION, SOLUTION INTRAMUSCULAR; INTRAVENOUS; SUBCUTANEOUS AS NEEDED
Status: DISCONTINUED | OUTPATIENT
Start: 2024-12-13 | End: 2024-12-13

## 2024-12-13 RX ORDER — POLYETHYLENE GLYCOL 3350 17 G/17G
17 POWDER, FOR SOLUTION ORAL DAILY
COMMUNITY

## 2024-12-13 RX ORDER — ACETAMINOPHEN 325 MG/1
650 TABLET ORAL EVERY 8 HOURS PRN
COMMUNITY
End: 2024-12-16 | Stop reason: HOSPADM

## 2024-12-13 RX ORDER — ACETAMINOPHEN 325 MG/1
650 TABLET ORAL EVERY 4 HOURS PRN
Status: DISCONTINUED | OUTPATIENT
Start: 2024-12-13 | End: 2024-12-13 | Stop reason: HOSPADM

## 2024-12-13 RX ORDER — ONDANSETRON HYDROCHLORIDE 2 MG/ML
4 INJECTION, SOLUTION INTRAVENOUS EVERY 8 HOURS PRN
Status: DISCONTINUED | OUTPATIENT
Start: 2024-12-13 | End: 2024-12-16 | Stop reason: HOSPADM

## 2024-12-13 RX ORDER — CYCLOBENZAPRINE HCL 10 MG
10 TABLET ORAL 3 TIMES DAILY PRN
Status: DISCONTINUED | OUTPATIENT
Start: 2024-12-13 | End: 2024-12-16 | Stop reason: HOSPADM

## 2024-12-13 RX ORDER — HYDROMORPHONE HYDROCHLORIDE 0.2 MG/ML
0.1 INJECTION INTRAMUSCULAR; INTRAVENOUS; SUBCUTANEOUS EVERY 5 MIN PRN
Status: DISCONTINUED | OUTPATIENT
Start: 2024-12-13 | End: 2024-12-13 | Stop reason: HOSPADM

## 2024-12-13 RX ORDER — NALOXONE HYDROCHLORIDE 0.4 MG/ML
0.2 INJECTION, SOLUTION INTRAMUSCULAR; INTRAVENOUS; SUBCUTANEOUS EVERY 5 MIN PRN
Status: DISCONTINUED | OUTPATIENT
Start: 2024-12-13 | End: 2024-12-16 | Stop reason: HOSPADM

## 2024-12-13 RX ORDER — DEXTROSE 50 % IN WATER (D50W) INTRAVENOUS SYRINGE
25
Status: DISCONTINUED | OUTPATIENT
Start: 2024-12-13 | End: 2024-12-16 | Stop reason: HOSPADM

## 2024-12-13 RX ORDER — OXYCODONE HYDROCHLORIDE 5 MG/1
5 TABLET ORAL EVERY 4 HOURS PRN
Status: DISCONTINUED | OUTPATIENT
Start: 2024-12-13 | End: 2024-12-16 | Stop reason: HOSPADM

## 2024-12-13 RX ORDER — ALBUTEROL SULFATE 0.83 MG/ML
2.5 SOLUTION RESPIRATORY (INHALATION) ONCE AS NEEDED
Status: DISCONTINUED | OUTPATIENT
Start: 2024-12-13 | End: 2024-12-13 | Stop reason: HOSPADM

## 2024-12-13 RX ORDER — MORPHINE SULFATE 2 MG/ML
2 INJECTION, SOLUTION INTRAMUSCULAR; INTRAVENOUS EVERY 4 HOURS PRN
Status: DISCONTINUED | OUTPATIENT
Start: 2024-12-13 | End: 2024-12-14

## 2024-12-13 RX ORDER — MIDAZOLAM HYDROCHLORIDE 1 MG/ML
1 INJECTION, SOLUTION INTRAMUSCULAR; INTRAVENOUS ONCE AS NEEDED
Status: DISCONTINUED | OUTPATIENT
Start: 2024-12-13 | End: 2024-12-13 | Stop reason: HOSPADM

## 2024-12-13 RX ORDER — ONDANSETRON HYDROCHLORIDE 2 MG/ML
4 INJECTION, SOLUTION INTRAVENOUS ONCE AS NEEDED
Status: DISCONTINUED | OUTPATIENT
Start: 2024-12-13 | End: 2024-12-13 | Stop reason: HOSPADM

## 2024-12-13 RX ORDER — METOPROLOL TARTRATE 25 MG/1
12.5 TABLET, FILM COATED ORAL 2 TIMES DAILY
Status: DISCONTINUED | OUTPATIENT
Start: 2024-12-13 | End: 2024-12-16 | Stop reason: HOSPADM

## 2024-12-13 RX ORDER — ATORVASTATIN CALCIUM 40 MG/1
40 TABLET, FILM COATED ORAL NIGHTLY
Status: DISCONTINUED | OUTPATIENT
Start: 2024-12-13 | End: 2024-12-16 | Stop reason: HOSPADM

## 2024-12-13 RX ORDER — OXYCODONE HYDROCHLORIDE 5 MG/1
5 TABLET ORAL EVERY 6 HOURS PRN
Qty: 15 TABLET | Refills: 0 | OUTPATIENT
Start: 2024-12-13

## 2024-12-13 RX ORDER — DIAZEPAM 5 MG/ML
5 INJECTION, SOLUTION INTRAMUSCULAR; INTRAVENOUS ONCE
Status: COMPLETED | OUTPATIENT
Start: 2024-12-14 | End: 2024-12-14

## 2024-12-13 RX ORDER — PHENYLEPHRINE HCL IN 0.9% NACL 1 MG/10 ML
SYRINGE (ML) INTRAVENOUS AS NEEDED
Status: DISCONTINUED | OUTPATIENT
Start: 2024-12-13 | End: 2024-12-13

## 2024-12-13 RX ORDER — ACETAMINOPHEN, DIPHENHYDRAMINE HCL, PHENYLEPHRINE HCL 325; 25; 5 MG/1; MG/1; MG/1
10 TABLET ORAL NIGHTLY
COMMUNITY

## 2024-12-13 RX ORDER — POLYETHYLENE GLYCOL 3350 17 G/17G
17 POWDER, FOR SOLUTION ORAL DAILY
Status: DISCONTINUED | OUTPATIENT
Start: 2024-12-13 | End: 2024-12-16 | Stop reason: HOSPADM

## 2024-12-13 RX ORDER — ONDANSETRON HYDROCHLORIDE 2 MG/ML
INJECTION, SOLUTION INTRAVENOUS AS NEEDED
Status: DISCONTINUED | OUTPATIENT
Start: 2024-12-13 | End: 2024-12-13

## 2024-12-13 RX ORDER — ONDANSETRON 4 MG/1
4 TABLET, FILM COATED ORAL EVERY 8 HOURS PRN
Status: DISCONTINUED | OUTPATIENT
Start: 2024-12-13 | End: 2024-12-16 | Stop reason: HOSPADM

## 2024-12-13 RX ORDER — FENTANYL CITRATE 50 UG/ML
INJECTION, SOLUTION INTRAMUSCULAR; INTRAVENOUS AS NEEDED
Status: DISCONTINUED | OUTPATIENT
Start: 2024-12-13 | End: 2024-12-13

## 2024-12-13 RX ORDER — BUPROPION HYDROCHLORIDE 150 MG/1
300 TABLET ORAL DAILY
Status: DISCONTINUED | OUTPATIENT
Start: 2024-12-13 | End: 2024-12-13

## 2024-12-13 RX ORDER — ACETAMINOPHEN 325 MG/1
650 TABLET ORAL EVERY 6 HOURS
Status: DISCONTINUED | OUTPATIENT
Start: 2024-12-13 | End: 2024-12-16 | Stop reason: HOSPADM

## 2024-12-13 RX ORDER — OXYCODONE AND ACETAMINOPHEN 5; 325 MG/1; MG/1
1 TABLET ORAL EVERY 4 HOURS PRN
Status: DISCONTINUED | OUTPATIENT
Start: 2024-12-13 | End: 2024-12-13 | Stop reason: HOSPADM

## 2024-12-13 RX ORDER — SULFAMETHOXAZOLE AND TRIMETHOPRIM 800; 160 MG/1; MG/1
1 TABLET ORAL 2 TIMES DAILY
COMMUNITY
Start: 2024-12-03 | End: 2024-12-16 | Stop reason: HOSPADM

## 2024-12-13 RX ORDER — SODIUM CHLORIDE, SODIUM LACTATE, POTASSIUM CHLORIDE, CALCIUM CHLORIDE 600; 310; 30; 20 MG/100ML; MG/100ML; MG/100ML; MG/100ML
INJECTION, SOLUTION INTRAVENOUS CONTINUOUS PRN
Status: DISCONTINUED | OUTPATIENT
Start: 2024-12-13 | End: 2024-12-13

## 2024-12-13 RX ORDER — BISACODYL 10 MG/1
10 SUPPOSITORY RECTAL DAILY PRN
COMMUNITY

## 2024-12-13 RX ORDER — SODIUM CHLORIDE 0.9 G/100ML
IRRIGANT IRRIGATION AS NEEDED
Status: DISCONTINUED | OUTPATIENT
Start: 2024-12-13 | End: 2024-12-13 | Stop reason: HOSPADM

## 2024-12-13 RX ORDER — LINEZOLID 600 MG/1
600 TABLET, FILM COATED ORAL EVERY 12 HOURS SCHEDULED
Status: DISCONTINUED | OUTPATIENT
Start: 2024-12-13 | End: 2024-12-13

## 2024-12-13 RX ORDER — OXYCODONE HYDROCHLORIDE 10 MG/1
10 TABLET ORAL EVERY 4 HOURS PRN
Status: DISCONTINUED | OUTPATIENT
Start: 2024-12-13 | End: 2024-12-16 | Stop reason: HOSPADM

## 2024-12-13 RX ORDER — HYDRALAZINE HYDROCHLORIDE 20 MG/ML
10 INJECTION INTRAMUSCULAR; INTRAVENOUS EVERY 4 HOURS PRN
Status: DISCONTINUED | OUTPATIENT
Start: 2024-12-13 | End: 2024-12-16 | Stop reason: HOSPADM

## 2024-12-13 RX ORDER — DULOXETIN HYDROCHLORIDE 60 MG/1
60 CAPSULE, DELAYED RELEASE ORAL DAILY
Status: DISCONTINUED | OUTPATIENT
Start: 2024-12-13 | End: 2024-12-16 | Stop reason: HOSPADM

## 2024-12-13 RX ORDER — ACETAMINOPHEN 325 MG/1
650 TABLET ORAL 3 TIMES DAILY
COMMUNITY

## 2024-12-13 RX ORDER — ACETAMINOPHEN 325 MG/1
975 TABLET ORAL ONCE
Status: DISCONTINUED | OUTPATIENT
Start: 2024-12-13 | End: 2024-12-13 | Stop reason: HOSPADM

## 2024-12-13 RX ORDER — HYDRALAZINE HYDROCHLORIDE 20 MG/ML
5 INJECTION INTRAMUSCULAR; INTRAVENOUS EVERY 30 MIN PRN
Status: DISCONTINUED | OUTPATIENT
Start: 2024-12-13 | End: 2024-12-13 | Stop reason: HOSPADM

## 2024-12-13 RX ORDER — ACETAMINOPHEN 325 MG/1
650 TABLET ORAL EVERY 6 HOURS PRN
Qty: 30 TABLET | Refills: 0 | OUTPATIENT
Start: 2024-12-13

## 2024-12-13 RX ORDER — DEXTROMETHORPHAN POLISTIREX 30 MG/5 ML
1 SUSPENSION, EXTENDED RELEASE 12 HR ORAL DAILY PRN
COMMUNITY

## 2024-12-13 RX ORDER — PHENAZOPYRIDINE HYDROCHLORIDE 100 MG/1
200 TABLET, FILM COATED ORAL ONCE AS NEEDED
Status: DISCONTINUED | OUTPATIENT
Start: 2024-12-13 | End: 2024-12-13 | Stop reason: HOSPADM

## 2024-12-13 RX ORDER — GLYCOPYRROLATE 0.2 MG/ML
INJECTION INTRAMUSCULAR; INTRAVENOUS AS NEEDED
Status: DISCONTINUED | OUTPATIENT
Start: 2024-12-13 | End: 2024-12-13

## 2024-12-13 RX ORDER — SODIUM CHLORIDE, SODIUM LACTATE, POTASSIUM CHLORIDE, CALCIUM CHLORIDE 600; 310; 30; 20 MG/100ML; MG/100ML; MG/100ML; MG/100ML
100 INJECTION, SOLUTION INTRAVENOUS CONTINUOUS
Status: ACTIVE | OUTPATIENT
Start: 2024-12-13 | End: 2024-12-14

## 2024-12-13 RX ORDER — DEXTROSE 50 % IN WATER (D50W) INTRAVENOUS SYRINGE
12.5
Status: DISCONTINUED | OUTPATIENT
Start: 2024-12-13 | End: 2024-12-16 | Stop reason: HOSPADM

## 2024-12-13 RX ORDER — BUPROPION HYDROCHLORIDE 150 MG/1
450 TABLET ORAL DAILY
Status: DISCONTINUED | OUTPATIENT
Start: 2024-12-13 | End: 2024-12-16 | Stop reason: HOSPADM

## 2024-12-13 RX ORDER — HYDROMORPHONE HYDROCHLORIDE 0.2 MG/ML
0.2 INJECTION INTRAMUSCULAR; INTRAVENOUS; SUBCUTANEOUS EVERY 4 HOURS PRN
Status: DISCONTINUED | OUTPATIENT
Start: 2024-12-13 | End: 2024-12-14

## 2024-12-13 RX ORDER — ADHESIVE BANDAGE
30 BANDAGE TOPICAL DAILY PRN
COMMUNITY

## 2024-12-13 RX ORDER — AMOXICILLIN 250 MG
2 CAPSULE ORAL 2 TIMES DAILY
Status: DISCONTINUED | OUTPATIENT
Start: 2024-12-13 | End: 2024-12-16 | Stop reason: HOSPADM

## 2024-12-13 RX ORDER — IBUPROFEN 200 MG
1 TABLET ORAL 2 TIMES DAILY
COMMUNITY
End: 2024-12-16 | Stop reason: HOSPADM

## 2024-12-13 RX ORDER — PHENYLEPHRINE 10 MG/250 ML(40 MCG/ML)IN 0.9 % SOD.CHLORIDE INTRAVENOUS
CONTINUOUS PRN
Status: DISCONTINUED | OUTPATIENT
Start: 2024-12-13 | End: 2024-12-13

## 2024-12-13 RX ORDER — LIDOCAINE HYDROCHLORIDE AND EPINEPHRINE 10; 10 UG/ML; MG/ML
INJECTION, SOLUTION INFILTRATION; PERINEURAL AS NEEDED
Status: DISCONTINUED | OUTPATIENT
Start: 2024-12-13 | End: 2024-12-13 | Stop reason: HOSPADM

## 2024-12-13 RX ORDER — MIDAZOLAM HYDROCHLORIDE 1 MG/ML
INJECTION, SOLUTION INTRAMUSCULAR; INTRAVENOUS AS NEEDED
Status: DISCONTINUED | OUTPATIENT
Start: 2024-12-13 | End: 2024-12-13

## 2024-12-13 RX ORDER — LIDOCAINE HYDROCHLORIDE 20 MG/ML
INJECTION, SOLUTION EPIDURAL; INFILTRATION; INTRACAUDAL; PERINEURAL AS NEEDED
Status: DISCONTINUED | OUTPATIENT
Start: 2024-12-13 | End: 2024-12-13

## 2024-12-13 RX ORDER — HEPARIN SODIUM 5000 [USP'U]/ML
5000 INJECTION, SOLUTION INTRAVENOUS; SUBCUTANEOUS EVERY 8 HOURS
Status: DISCONTINUED | OUTPATIENT
Start: 2024-12-14 | End: 2024-12-16 | Stop reason: HOSPADM

## 2024-12-13 RX ORDER — ROCURONIUM BROMIDE 50 MG/5 ML
SYRINGE (ML) INTRAVENOUS AS NEEDED
Status: DISCONTINUED | OUTPATIENT
Start: 2024-12-13 | End: 2024-12-13

## 2024-12-13 RX ORDER — CLINDAMYCIN HYDROCHLORIDE 300 MG/1
300 CAPSULE ORAL 3 TIMES DAILY
COMMUNITY
Start: 2024-12-03 | End: 2024-12-16 | Stop reason: HOSPADM

## 2024-12-13 RX ORDER — CYCLOBENZAPRINE HCL 10 MG
10 TABLET ORAL 3 TIMES DAILY PRN
Qty: 90 TABLET | Refills: 0 | OUTPATIENT
Start: 2024-12-13 | End: 2025-01-12

## 2024-12-13 RX ORDER — CLINDAMYCIN PHOSPHATE 900 MG/50ML
INJECTION, SOLUTION INTRAVENOUS AS NEEDED
Status: DISCONTINUED | OUTPATIENT
Start: 2024-12-13 | End: 2024-12-13

## 2024-12-13 RX ORDER — SODIUM CHLORIDE, SODIUM LACTATE, POTASSIUM CHLORIDE, CALCIUM CHLORIDE 600; 310; 30; 20 MG/100ML; MG/100ML; MG/100ML; MG/100ML
125 INJECTION, SOLUTION INTRAVENOUS CONTINUOUS
Status: DISCONTINUED | OUTPATIENT
Start: 2024-12-13 | End: 2024-12-13 | Stop reason: HOSPADM

## 2024-12-13 RX ORDER — MUPIROCIN 20 MG/G
1 OINTMENT TOPICAL 2 TIMES DAILY
COMMUNITY
Start: 2024-12-03 | End: 2024-12-16 | Stop reason: HOSPADM

## 2024-12-13 RX ORDER — DICLOFENAC SODIUM 10 MG/G
2 GEL TOPICAL 3 TIMES DAILY
COMMUNITY

## 2024-12-13 RX ORDER — WATER 1 ML/ML
IRRIGANT IRRIGATION AS NEEDED
Status: DISCONTINUED | OUTPATIENT
Start: 2024-12-13 | End: 2024-12-13 | Stop reason: HOSPADM

## 2024-12-13 RX ORDER — OXYCODONE HYDROCHLORIDE 10 MG/1
10 TABLET ORAL EVERY 4 HOURS PRN
Status: DISCONTINUED | OUTPATIENT
Start: 2024-12-13 | End: 2024-12-13 | Stop reason: HOSPADM

## 2024-12-13 RX ORDER — PROPOFOL 10 MG/ML
INJECTION, EMULSION INTRAVENOUS AS NEEDED
Status: DISCONTINUED | OUTPATIENT
Start: 2024-12-13 | End: 2024-12-13

## 2024-12-13 RX ORDER — HYDROMORPHONE HYDROCHLORIDE 1 MG/ML
1 INJECTION, SOLUTION INTRAMUSCULAR; INTRAVENOUS; SUBCUTANEOUS EVERY 5 MIN PRN
Status: DISCONTINUED | OUTPATIENT
Start: 2024-12-13 | End: 2024-12-13 | Stop reason: HOSPADM

## 2024-12-13 RX ORDER — MEMANTINE HYDROCHLORIDE 5 MG/1
10 TABLET ORAL 2 TIMES DAILY
Status: DISCONTINUED | OUTPATIENT
Start: 2024-12-13 | End: 2024-12-16 | Stop reason: HOSPADM

## 2024-12-13 SDOH — ECONOMIC STABILITY: HOUSING INSECURITY: IN THE PAST 12 MONTHS, HOW MANY TIMES HAVE YOU MOVED WHERE YOU WERE LIVING?: 1

## 2024-12-13 SDOH — SOCIAL STABILITY: SOCIAL INSECURITY: WITHIN THE LAST YEAR, HAVE YOU BEEN AFRAID OF YOUR PARTNER OR EX-PARTNER?: NO

## 2024-12-13 SDOH — SOCIAL STABILITY: SOCIAL INSECURITY
WITHIN THE LAST YEAR, HAVE YOU BEEN KICKED, HIT, SLAPPED, OR OTHERWISE PHYSICALLY HURT BY YOUR PARTNER OR EX-PARTNER?: NO

## 2024-12-13 SDOH — SOCIAL STABILITY: SOCIAL INSECURITY
WITHIN THE LAST YEAR, HAVE YOU BEEN RAPED OR FORCED TO HAVE ANY KIND OF SEXUAL ACTIVITY BY YOUR PARTNER OR EX-PARTNER?: NO

## 2024-12-13 SDOH — ECONOMIC STABILITY: FOOD INSECURITY: WITHIN THE PAST 12 MONTHS, THE FOOD YOU BOUGHT JUST DIDN'T LAST AND YOU DIDN'T HAVE MONEY TO GET MORE.: NEVER TRUE

## 2024-12-13 SDOH — ECONOMIC STABILITY: HOUSING INSECURITY: AT ANY TIME IN THE PAST 12 MONTHS, WERE YOU HOMELESS OR LIVING IN A SHELTER (INCLUDING NOW)?: YES

## 2024-12-13 SDOH — ECONOMIC STABILITY: FOOD INSECURITY: WITHIN THE PAST 12 MONTHS, YOU WORRIED THAT YOUR FOOD WOULD RUN OUT BEFORE YOU GOT THE MONEY TO BUY MORE.: NEVER TRUE

## 2024-12-13 SDOH — ECONOMIC STABILITY: HOUSING INSECURITY: IN THE LAST 12 MONTHS, WAS THERE A TIME WHEN YOU WERE NOT ABLE TO PAY THE MORTGAGE OR RENT ON TIME?: YES

## 2024-12-13 SDOH — HEALTH STABILITY: PHYSICAL HEALTH
HOW OFTEN DO YOU NEED TO HAVE SOMEONE HELP YOU WHEN YOU READ INSTRUCTIONS, PAMPHLETS, OR OTHER WRITTEN MATERIAL FROM YOUR DOCTOR OR PHARMACY?: NEVER

## 2024-12-13 SDOH — SOCIAL STABILITY: SOCIAL INSECURITY: DOES ANYONE TRY TO KEEP YOU FROM HAVING/CONTACTING OTHER FRIENDS OR DOING THINGS OUTSIDE YOUR HOME?: NO

## 2024-12-13 SDOH — HEALTH STABILITY: MENTAL HEALTH: CURRENT SMOKER: 0

## 2024-12-13 SDOH — ECONOMIC STABILITY: INCOME INSECURITY: IN THE PAST 12 MONTHS HAS THE ELECTRIC, GAS, OIL, OR WATER COMPANY THREATENED TO SHUT OFF SERVICES IN YOUR HOME?: YES

## 2024-12-13 SDOH — SOCIAL STABILITY: SOCIAL INSECURITY: WERE YOU ABLE TO COMPLETE ALL THE BEHAVIORAL HEALTH SCREENINGS?: YES

## 2024-12-13 SDOH — ECONOMIC STABILITY: FOOD INSECURITY: HOW HARD IS IT FOR YOU TO PAY FOR THE VERY BASICS LIKE FOOD, HOUSING, MEDICAL CARE, AND HEATING?: SOMEWHAT HARD

## 2024-12-13 SDOH — SOCIAL STABILITY: SOCIAL INSECURITY: WITHIN THE LAST YEAR, HAVE YOU BEEN HUMILIATED OR EMOTIONALLY ABUSED IN OTHER WAYS BY YOUR PARTNER OR EX-PARTNER?: NO

## 2024-12-13 SDOH — ECONOMIC STABILITY: TRANSPORTATION INSECURITY: IN THE PAST 12 MONTHS, HAS LACK OF TRANSPORTATION KEPT YOU FROM MEDICAL APPOINTMENTS OR FROM GETTING MEDICATIONS?: NO

## 2024-12-13 SDOH — SOCIAL STABILITY: SOCIAL INSECURITY: ARE YOU OR HAVE YOU BEEN THREATENED OR ABUSED PHYSICALLY, EMOTIONALLY, OR SEXUALLY BY ANYONE?: NO

## 2024-12-13 SDOH — SOCIAL STABILITY: SOCIAL INSECURITY: DO YOU FEEL ANYONE HAS EXPLOITED OR TAKEN ADVANTAGE OF YOU FINANCIALLY OR OF YOUR PERSONAL PROPERTY?: NO

## 2024-12-13 SDOH — SOCIAL STABILITY: SOCIAL INSECURITY: HAVE YOU HAD THOUGHTS OF HARMING ANYONE ELSE?: NO

## 2024-12-13 SDOH — SOCIAL STABILITY: SOCIAL INSECURITY: ARE THERE ANY APPARENT SIGNS OF INJURIES/BEHAVIORS THAT COULD BE RELATED TO ABUSE/NEGLECT?: NO

## 2024-12-13 SDOH — SOCIAL STABILITY: SOCIAL INSECURITY: ABUSE: ADULT

## 2024-12-13 SDOH — SOCIAL STABILITY: SOCIAL INSECURITY: DO YOU FEEL UNSAFE GOING BACK TO THE PLACE WHERE YOU ARE LIVING?: NO

## 2024-12-13 SDOH — SOCIAL STABILITY: SOCIAL INSECURITY: HAS ANYONE EVER THREATENED TO HURT YOUR FAMILY OR YOUR PETS?: NO

## 2024-12-13 ASSESSMENT — PAIN SCALES - GENERAL
PAINLEVEL_OUTOF10: 3
PAINLEVEL_OUTOF10: 10 - WORST POSSIBLE PAIN
PAIN_LEVEL: 5
PAINLEVEL_OUTOF10: 8
PAINLEVEL_OUTOF10: 10 - WORST POSSIBLE PAIN
PAINLEVEL_OUTOF10: 3
PAINLEVEL_OUTOF10: 5 - MODERATE PAIN
PAINLEVEL_OUTOF10: 10 - WORST POSSIBLE PAIN
PAINLEVEL_OUTOF10: 10 - WORST POSSIBLE PAIN

## 2024-12-13 ASSESSMENT — ACTIVITIES OF DAILY LIVING (ADL)
HEARING - RIGHT EAR: FUNCTIONAL
LACK_OF_TRANSPORTATION: NO
DRESSING YOURSELF: NEEDS ASSISTANCE
HEARING - LEFT EAR: FUNCTIONAL
LACK_OF_TRANSPORTATION: NO
JUDGMENT_ADEQUATE_SAFELY_COMPLETE_DAILY_ACTIVITIES: NO
WALKS IN HOME: NEEDS ASSISTANCE
ADEQUATE_TO_COMPLETE_ADL: YES
GROOMING: NEEDS ASSISTANCE
ASSISTIVE_DEVICE: WALKER
BATHING: NEEDS ASSISTANCE
FEEDING YOURSELF: NEEDS ASSISTANCE
LACK_OF_TRANSPORTATION: NO
PATIENT'S MEMORY ADEQUATE TO SAFELY COMPLETE DAILY ACTIVITIES?: YES
TOILETING: NEEDS ASSISTANCE

## 2024-12-13 ASSESSMENT — PAIN - FUNCTIONAL ASSESSMENT
PAIN_FUNCTIONAL_ASSESSMENT: 0-10
PAIN_FUNCTIONAL_ASSESSMENT: 0-10

## 2024-12-13 ASSESSMENT — COGNITIVE AND FUNCTIONAL STATUS - GENERAL: PATIENT BASELINE BEDBOUND: YES

## 2024-12-13 ASSESSMENT — PAIN DESCRIPTION - LOCATION
LOCATION: NECK

## 2024-12-13 ASSESSMENT — PAIN SCALES - PAIN ASSESSMENT IN ADVANCED DEMENTIA (PAINAD)
TOTALSCORE: 0
BODYLANGUAGE: RELAXED
FACIALEXPRESSION: SMILING OR INEXPRESSIVE
BREATHING: NORMAL
TOTALSCORE: MEDICATION (SEE MAR)
CONSOLABILITY: NO NEED TO CONSOLE

## 2024-12-13 ASSESSMENT — LIFESTYLE VARIABLES
HOW OFTEN DO YOU HAVE A DRINK CONTAINING ALCOHOL: NEVER
HOW OFTEN DO YOU HAVE 6 OR MORE DRINKS ON ONE OCCASION: NEVER
AUDIT-C TOTAL SCORE: 0
AUDIT-C TOTAL SCORE: 0
SKIP TO QUESTIONS 9-10: 1
HOW MANY STANDARD DRINKS CONTAINING ALCOHOL DO YOU HAVE ON A TYPICAL DAY: PATIENT DOES NOT DRINK

## 2024-12-13 ASSESSMENT — PAIN SCALES - WONG BAKER: WONGBAKER_NUMERICALRESPONSE: HURTS WHOLE LOT

## 2024-12-13 ASSESSMENT — PAIN DESCRIPTION - ORIENTATION: ORIENTATION: ANTERIOR

## 2024-12-13 NOTE — PROGRESS NOTES
12/13/24 1424   Discharge Planning   Living Arrangements Alone   Support Systems Children   Type of Residence Skilled nursing facility   Home or Post Acute Services Post acute facilities (Rehab/SNF/etc)   Type of Post Acute Facility Services Skilled nursing   Expected Discharge Disposition SNF   Does the patient need discharge transport arranged? Yes   RoundTrip coordination needed? Yes     Met with patient at bedside. Admitted for cervical discectomy and fusion. Pt arrived from [facility]. States she does not want to return there. Pt states she is now homeless and has no real discharge plan. SW consulted for assistance with discharge planning. Therapy evals pending. TCC team will continue to follow.     1505 Pt has H/O dementia. [facility] verifies pt is a bed hold and can return when ready. [facility] sent return referral.

## 2024-12-13 NOTE — ANESTHESIA PREPROCEDURE EVALUATION
Patient: Johanne Acosta    Procedure Information       Anesthesia Start Date/Time: 12/13/24 0751    Procedure: KEEP 1ST CASE OF THE DAY--C3-4, C4-5, and C5-6 Fusion Spine Anterior Cervical and Discectomy; C3-6 anterior plate - DOCTOR REQUESTS 3 HOURS FOR THIS PROCEDURE  ALL CPT CODES FOR THIS PROCEDURE  78605, 22552 X2, 69767, 09182, 17111, 22853 X3    Location: [facility] OR 06 / Virtual [facility] OR    Surgeons: Jer Neri MD PhD            Relevant Problems   Cardiac   (+) Hyperlipemia   (+) Hypertension   (+) Hypertriglyceridemia      Neuro   (+) Anxiety and depression   (+) Dementia with mood disturbance, unspecified dementia severity, unspecified dementia type (Multi)   (+) Major depressive disorder, recurrent, unspecified   (+) Moderate episode of recurrent major depressive disorder   (+) PTSD (post-traumatic stress disorder)   (+) Radiculopathy      GI   (+) Dysphagia   (+) GERD (gastroesophageal reflux disease)      /Renal   (+) Hydronephrosis      Endocrine   (+) Hypothyroidism   (+) Obese      Musculoskeletal   (+) Cervical spondylosis with myelopathy   (+) Cervical spondylosis with radiculopathy   (+) Degenerative cervical spinal stenosis   (+) Osteoarthritis   (+) Osteoarthritis of both knees   (+) Primary osteoarthritis of first carpometacarpal joint of left hand      HEENT   (+) Acute maxillary sinusitis      ID   (+) Impetigo   (+) Prosthetic joint infection (CMS-HCC)       Clinical information reviewed:   Tobacco  Allergies  Meds  Problems  Med Hx  Surg Hx   Fam Hx  Soc   Hx        NPO Detail:  NPO/Void Status  Date of Last Liquid: 12/12/24  Time of Last Liquid: 2200  Date of Last Solid: 12/12/24  Time of Last Solid: 1800         Physical Exam    Airway  Mallampati: II  TM distance: >3 FB  Neck ROM: full     Cardiovascular - normal exam  Rhythm: regular  Rate: normal     Dental   (+) upper dentures, lower dentures     Pulmonary   Breath sounds clear to auscultation  (+) decreased breath sounds      Abdominal   (+) obese  Abdomen: soft  Bowel sounds: normal           Anesthesia Plan    History of general anesthesia?: yes  History of complications of general anesthesia?: no    ASA 3     general     The patient is not a current smoker.  Patient was previously instructed to abstain from smoking on day of procedure.  Patient did not smoke on day of procedure.  Education provided regarding risk of obstructive sleep apnea.  intravenous induction   Postoperative administration of opioids is intended.  Anesthetic plan and risks discussed with patient.  Use of blood products discussed with patient who consented to blood products.    Plan discussed with CRNA.

## 2024-12-13 NOTE — HOSPITAL COURSE
H/o GERD p/w cervical myelopathy, 12/13 s/p C3-C6 ACDF      PLAN    Floor, drain, uprights in AM, PTOT

## 2024-12-13 NOTE — ANESTHESIA PROCEDURE NOTES
Arterial Line:    Date/Time: 12/13/2024 8:24 AM    Staffing  Performed: attending   Authorized by: Jessie Kulkarni MD    Performed by: YUMIKO Lewis-CRNA    An arterial line was placed. Procedure performed using ultrasound guidance.in the OR for the following indication(s): continuous blood pressure monitoring and blood sampling needed.    A 20 gauge (size), 1 and 3/4 inch (length), Arrow (type) catheter was placed into the Left brachial artery, secured by Tegaderm and Bioptach placed,   Seldinger technique used.  Events:  greater than 3 attempts, patient tolerated procedure well with no complications and 1st attempts in Right radial/wrist, pressure held at site and dressing applied..

## 2024-12-13 NOTE — ANESTHESIA POSTPROCEDURE EVALUATION
Patient: Johanne Acosta    Procedure Summary       Date: 12/13/24 Room / Location: [facility] OR 06 / Virtual [facility] OR    Anesthesia Start: 0751 Anesthesia Stop: 1211    Procedure: KEEP 1ST CASE OF THE DAY--C3-4, C4-5, and C5-6 Fusion Spine Anterior Cervical and Discectomy; C3-6 anterior plate Diagnosis:       Cervical spondylosis with radiculopathy      Cervical spondylosis with myelopathy      Degenerative cervical spinal stenosis      (Cervical spondylosis with radiculopathy [M47.22])      (Cervical spondylosis with myelopathy [M47.12])      (Degenerative cervical spinal stenosis [M48.02])    Surgeons: Jer Neri MD PhD Responsible Provider: Jessie Kulkarni MD    Anesthesia Type: general ASA Status: 3            Anesthesia Type: general    Vitals Value Taken Time   /84 12/13/24 1212   Temp 36.1 12/13/24 1212   Pulse 83 12/13/24 1212   Resp 16 12/13/24 1212   SpO2 100 12/13/24 1212       Anesthesia Post Evaluation    Patient location during evaluation: PACU  Patient participation: complete - patient participated  Level of consciousness: sleepy but conscious and responsive to verbal stimuli  Pain score: 5 (c/o headache)  Pain management: inadequate  Multimodal analgesia pain management approach  Airway patency: patent  Two or more strategies used to mitigate risk of obstructive sleep apnea  Cardiovascular status: acceptable and hemodynamically stable  Respiratory status: acceptable, face mask, nonlabored ventilation, unassisted and spontaneous ventilation  Hydration status: acceptable  Postoperative Nausea and Vomiting: none  Comments: Handoff to BAY BaezaU RN        There were no known notable events for this encounter.

## 2024-12-13 NOTE — NURSING NOTE
1504 Notified Dr. Barr via secure chat(Dr. Neri added to chat as well) that ANGELINA drain has no output and is not holding suction. Phsycian states to leave to gravity at this time.  1628 Notified Dr. Barr that swelling appears to be worsening on neck as well as patient unable to take sips of water without coughing. Pt is sitting in upright position and alert and oriented when attempting to drink. Ice in place to neck.    1629 Physician requested picture. Picture sent  1636 Physician wants ANGELINA tube stripped and make sure tube is securely in drain.  1716 Notified physician that there is still no output in drain. Also notified that dilaudid not working for pain for pt as patient is unable to take any PO meds  1715 Physician wants dexamethasone given. Notified physician of corticosteroid allergy. And also asked about speech consult for coughing with PO fluids. Physician states that ok to give dexamethasone since side effect was mainly just irritability. And will reassess about speech consult tomorrow since this is not normally done right after this surgery.   1732 Asked if we should order med consult since I cannot give oral metoprolol/notified /82 HR 98. Dr. Barr states to see how patient does overnight-he will order hydralazine and maybe consult tomorrow  1804 Dr. Neri at bedside  1820 Dr. Neri able to strip drain and working at this time. Call physician if any respiratory issues

## 2024-12-13 NOTE — SIGNIFICANT EVENT
Spoke with patient this AM about code status of DNR/DNI. Explained to patient that for the procedure and immediate post operative period, we would like for her to be full code as most perioperative events can be 2/2 to surgery itself. All questions answered, and patient agreed to be full code for surgery and 24 hours after, and DNR/DNI to be re-instated 12/14. Order placed in EMR.

## 2024-12-13 NOTE — OP NOTE
C3-4, C4-5, and C5-6 Fusion Spine Anterior Cervical and Discectomy; C3-6 anterior plate Operative Note     Date: 2024  OR Location: [facility] OR    Name: Johanne Acosta, : 1955, Age: 69 y.o., MRN: [id number], Sex: female    Diagnosis  Pre-op Diagnosis      * Cervical spondylosis with radiculopathy [M47.22]     * Cervical spondylosis with myelopathy [M47.12]     * Degenerative cervical spinal stenosis [M48.02] Post-op Diagnosis     * Cervical spondylosis with radiculopathy [M47.22]     * Cervical spondylosis with myelopathy [M47.12]     * Degenerative cervical spinal stenosis [M48.02]     Procedures  C3-C4, C4-C5 & C5-C6 anterior discectomies & interbody fusion; C3-C6 anterior plate; use of allograft    Surgeons      * Jer Neri - Primary    Resident/Fellow/Other Assistant:  Surgeons and Role:     * Jeromy Barr MD - Resident - Assisting    Staff:   Surgical Assistant:   Circulator: Nicole Clancyub Person: Winter Hairston Person: Hannah    Anesthesia Staff: Anesthesiologist: Jessie Kulkarni MD  CRNA: YUMIKO Lewis-CRNA    Procedure Summary  Anesthesia: Anesthesia type not filed in the log.  ASA: ASA status not filed in the log.  Estimated Blood Loss: 50mL  Intra-op Medications:   Administrations occurring from 0730 to 1205 on 24:   Medication Name Total Dose   lidocaine-epinephrine (Xylocaine W/EPI) 1 %-1:100,000 injection 10 mL   sodium chloride 0.9 % irrigation solution 1,000 mL   sterile water irrigation solution 1,000 mL   clindamycin (Cleocin)  mg in 50 mL D5W - premix 900 mg   fentaNYL (Sublimaze) injection 50 mcg/mL 100 mcg   glycopyrrolate (Robinul) injection 0.2 mg   HYDROmorphone (Dilaudid) injection 1 mg/mL 1 mg   ketamine injection 50 mg/ 5 mL (10 mg/mL) 50 mg   LR infusion Cannot be calculated   lidocaine PF (Xylocaine-MPF) local injection 2 % 100 mg   midazolam (Versed) injection 1 mg/mL 2 mg   ondansetron (Zofran) 2 mg/mL injection 4 mg   phenylephrine (Luis-Synephrine) 10  mg in sodium chloride 0.9% 250 mL (0.04 mg/mL) infusion (premix) 2.28 mg   phenylephrine 100 mcg/mL syringe 10 mL (prefilled) 600 mcg   propofol (Diprivan) injection 10 mg/mL 623.6 mg   rocuronium (Zemuron) 50 mg/5 mL prefilled syringe 90 mg   sugammadex (Bridion) 200 mg/2 mL injection 200 mg              Anesthesia Record               Intraprocedure I/O Totals          Intake    Phenylephrine Drip 0.00 mL    The total shown is the total volume documented since Anesthesia Start was filed.    LR infusion 1000.00 mL    clindamycin 900 mg/50 mL 50.00 mL    Total Intake 1050 mL       Output    Urine 130 mL    Est. Blood Loss 150 mL    Total Output 280 mL       Net    Net Volume 770 mL          Specimen: No specimens collected              Drains and/or Catheters:   Closed/Suction Drain 1 Anterior Neck Bulb 10 Fr. (Active)       Urethral Catheter Latex 16 Fr. (Active)           Implants:  Implants       Type Name Action Serial No.      Spinal Hardware CERVICAL, MODULUS, 10 DEG, 7 X 17 X 14MM - [id number] Implanted      Spinal Hardware CERVICAL, MODULUS, 10 DEG, 7 X 17 X 14MM - [id number] Implanted      Implant BONE MATRIX, OSTEOCEL, PRO CELLULAR, SMALL - [id number] - [id number] Implanted [id number]     Spinal Hardware CERVICAL, MODULUS, 10 DEG, 7 X 17 X 14MM - [id number] - [id number] Implanted [id number]     Spinal Hardware CERVICAL, MODULUS, 10 DEG, 7 X 17 X 14MM - [id number] Implanted      Spinal Hardware SCREW, ACP, SELF DRILL, 3.5 X 17MM, VARIABLE - SN/A - [id number] Implanted N/A     Screw 19 MMSCREW Implanted N/A     Plate 56 MM 3-LEVEL PLATE Implanted N/A              Findings: good placement of hardware    Indications: Johanne Acosta is an 69 y.o. female with a history of prior C6-C7 ACDF who presented to clinic with chronic progressive neck and bilateral upper extremity pain.  Her imaging showed advanced multilevel degenerative changes resulting in significant central and foraminal stenosis.  I discussed multiple  treatment options with the patient, including further conservative therapy as well as different surgical approaches.  I offered an anterior decompression and fusion.  I discussed the risks of surgery, including infection, blood loss, neurologic injury including spinal cord injury and recurrent laryngeal nerve injury, spinal fluid leak, and the need for further procedures.  Having acknowledged the risks and benefits, the patient elected to proceed with surgery.    Procedure Details: The patient was brought to the operating room.  After patient identification and procedure confirmation, an endotracheal intubation was performed.  A bump was placed beneath the patient to effect cervical extension.  A right-sided horizontal incision was designed.  The field was prepped and draped in the usual sterile fashion.  A time out was performed and perioperative intravenous antibiotics were confirmed.  After infiltration with local anesthetic, the skin was incised with a #15 blade.  With the use of monopolar electrocautery, Metzenbaum scissors and blunt dissection, we exposed and divided the platysma muscle horizontally.  Staying along the medial aspect of the sternocleidomastoid muscle, we continued to bluntly dissect down to the prevertebral fascia.  We then used Kittner swabs to finish our blunt dissection down to the anterior cervical spine.  Fluoroscopy was used to confirm the correct levels.  We then used monopolar electrocautery to reflect the longus colli muscles laterally and define the C5-C6 vertebral bodies and intervening disc space.  Mont[place]k pins were used to distract open the C5-C6 disc space.  The operating microscope was then brought into the field.     A total C5-C6 discectomy was performed with the use of Kerrison rongeurs, curettes and a high-speed drill, followed by resection of the visualized posterior longitudinal ligament.  Complete foraminotomies were performed bilaterally with the use of Kerrison rongeurs  and confirmed with the blunt nerve hook.  Further endplate preparation was completed with curettes and the high-speed drill.  The surgical field was then copiously irrigated.  Next, a trial was inserted into the disc space, followed by placement of a lordotic titanium cage (NuVasive) under direct fluoroscopy.  The cage had been pre-packed with allograft (Globus).  Additional discectomies with interbody fusion were performed at C4-C5 & C3-C4 in a similar fashion.  Next, an anterior plate extending from C3 to C6 was positioned and fixed in place with screws into the vertebral bodies at each level.  The locking tabs were then turned into position.  Fluoroscopy was used to confirm proper placement of the instrumentation.     We then turned our attention to the closure.  The surgical field was copiously irrigated.  Excellent hemostasis was achieved.  A subplatysmal drain was placed.  The platysmal and subcutaneous layers were closed with interrupted 3-0 Vicryl sutures.  The skin was closed with topical adhesive.    Complications:  None; patient tolerated the procedure well.    Disposition: PACU - hemodynamically stable.  Condition: stable     Task Performed by RNFA or Surgical Assistant:  Exposure of anterior spine    Attending Attestation: I was present and scrubbed for the key portions of the procedure.    Jer Neri  Phone Number: [phone]

## 2024-12-13 NOTE — ANESTHESIA PROCEDURE NOTES
Peripheral IV  Date/Time: 12/13/2024 8:10 AM  Inserted by: YUMIKO Lewis-NORMA    Placement  Needle size: 18 G  Laterality: left  Location: wrist  Local anesthetic: none  Site prep: chlorhexidine  Technique: anatomical landmarks  Attempts: 1

## 2024-12-13 NOTE — ANESTHESIA PROCEDURE NOTES
Airway  Date/Time: 12/13/2024 8:04 AM  Urgency: elective    Airway not difficult    Staffing  Performed: CRNA   Authorized by: Jessie Kulkarni MD    Performed by: YMUIKO Lewis-NORMA  Patient location during procedure: OR    Indications and Patient Condition  Indications for airway management: anesthesia  Spontaneous Ventilation: absent  Sedation level: deep  Preoxygenated: yes  Patient position: sniffing  Mask difficulty assessment: 2 - vent by mask + OA or adjuvant +/- NMBA    Final Airway Details  Final airway type: endotracheal airway      Successful airway: ETT  Cuffed: yes   Successful intubation technique: video laryngoscopy (Lakhani)  Facilitating devices/methods: intubating stylet  Blade: Ynes  Blade size: #3  ETT size (mm): 7.0  Cormack-Lehane Classification: grade I - full view of glottis  Placement verified by: chest auscultation, capnometry and palpation of cuff   Cuff volume (mL): 6  Measured from: lips  ETT to lips (cm): 21  Number of attempts at approach: 1    Additional Comments  Neck remained in neutral position

## 2024-12-14 ENCOUNTER — APPOINTMENT (OUTPATIENT)
Dept: RADIOLOGY | Facility: HOSPITAL | Age: 69
DRG: 472 | End: 2024-12-14
Payer: COMMERCIAL

## 2024-12-14 LAB
GLUCOSE BLD MANUAL STRIP-MCNC: 151 MG/DL (ref 74–99)
GLUCOSE BLD MANUAL STRIP-MCNC: 162 MG/DL (ref 74–99)
GLUCOSE BLD MANUAL STRIP-MCNC: 179 MG/DL (ref 74–99)
GLUCOSE BLD MANUAL STRIP-MCNC: 250 MG/DL (ref 74–99)

## 2024-12-14 PROCEDURE — 2500000004 HC RX 250 GENERAL PHARMACY W/ HCPCS (ALT 636 FOR OP/ED): Performed by: STUDENT IN AN ORGANIZED HEALTH CARE EDUCATION/TRAINING PROGRAM

## 2024-12-14 PROCEDURE — 2500000001 HC RX 250 WO HCPCS SELF ADMINISTERED DRUGS (ALT 637 FOR MEDICARE OP): Performed by: STUDENT IN AN ORGANIZED HEALTH CARE EDUCATION/TRAINING PROGRAM

## 2024-12-14 PROCEDURE — 97165 OT EVAL LOW COMPLEX 30 MIN: CPT | Mod: GO | Performed by: OCCUPATIONAL THERAPIST

## 2024-12-14 PROCEDURE — 1200000002 HC GENERAL ROOM WITH TELEMETRY DAILY

## 2024-12-14 PROCEDURE — 72040 X-RAY EXAM NECK SPINE 2-3 VW: CPT

## 2024-12-14 PROCEDURE — 82947 ASSAY GLUCOSE BLOOD QUANT: CPT

## 2024-12-14 PROCEDURE — 72040 X-RAY EXAM NECK SPINE 2-3 VW: CPT | Performed by: RADIOLOGY

## 2024-12-14 PROCEDURE — 2500000004 HC RX 250 GENERAL PHARMACY W/ HCPCS (ALT 636 FOR OP/ED): Performed by: NEUROLOGICAL SURGERY

## 2024-12-14 PROCEDURE — 97161 PT EVAL LOW COMPLEX 20 MIN: CPT | Mod: GP

## 2024-12-14 RX ORDER — METOPROLOL TARTRATE 1 MG/ML
2.5 INJECTION, SOLUTION INTRAVENOUS EVERY 6 HOURS
Status: DISCONTINUED | OUTPATIENT
Start: 2024-12-14 | End: 2024-12-14

## 2024-12-14 RX ORDER — LIDOCAINE 560 MG/1
1 PATCH PERCUTANEOUS; TOPICAL; TRANSDERMAL DAILY
Status: DISCONTINUED | OUTPATIENT
Start: 2024-12-15 | End: 2024-12-16 | Stop reason: HOSPADM

## 2024-12-14 RX ORDER — FAMOTIDINE 20 MG/1
20 TABLET, FILM COATED ORAL 2 TIMES DAILY
Status: DISCONTINUED | OUTPATIENT
Start: 2024-12-14 | End: 2024-12-16 | Stop reason: HOSPADM

## 2024-12-14 ASSESSMENT — PAIN SCALES - GENERAL
PAINLEVEL_OUTOF10: 4
PAINLEVEL_OUTOF10: 7
PAINLEVEL_OUTOF10: 5 - MODERATE PAIN
PAINLEVEL_OUTOF10: 10 - WORST POSSIBLE PAIN
PAINLEVEL_OUTOF10: 7
PAINLEVEL_OUTOF10: 8
PAINLEVEL_OUTOF10: 6
PAINLEVEL_OUTOF10: 6
PAINLEVEL_OUTOF10: 10 - WORST POSSIBLE PAIN
PAINLEVEL_OUTOF10: 6
PAINLEVEL_OUTOF10: 7
PAINLEVEL_OUTOF10: 6
PAINLEVEL_OUTOF10: 3
PAINLEVEL_OUTOF10: 5 - MODERATE PAIN
PAINLEVEL_OUTOF10: 7

## 2024-12-14 ASSESSMENT — PAIN DESCRIPTION - ORIENTATION
ORIENTATION: RIGHT
ORIENTATION: RIGHT
ORIENTATION: ANTERIOR

## 2024-12-14 ASSESSMENT — COGNITIVE AND FUNCTIONAL STATUS - GENERAL
TURNING FROM BACK TO SIDE WHILE IN FLAT BAD: A LITTLE
HELP NEEDED FOR BATHING: A LITTLE
PERSONAL GROOMING: A LITTLE
MOVING TO AND FROM BED TO CHAIR: A LITTLE
MOVING FROM LYING ON BACK TO SITTING ON SIDE OF FLAT BED WITH BEDRAILS: A LITTLE
DAILY ACTIVITIY SCORE: 20
STANDING UP FROM CHAIR USING ARMS: A LITTLE
MOBILITY SCORE: 18
WALKING IN HOSPITAL ROOM: A LITTLE
CLIMB 3 TO 5 STEPS WITH RAILING: A LITTLE
DRESSING REGULAR LOWER BODY CLOTHING: A LITTLE
TOILETING: A LITTLE

## 2024-12-14 ASSESSMENT — PAIN DESCRIPTION - LOCATION
LOCATION: NECK

## 2024-12-14 ASSESSMENT — PAIN - FUNCTIONAL ASSESSMENT
PAIN_FUNCTIONAL_ASSESSMENT: 0-10

## 2024-12-14 NOTE — NURSING NOTE
AHRF 2/2 ILD flair vs PNA Pt off the floor at this time to radiology.     AHRF 2/2 ILD flair vs PNA AHRF 2/2 ILD flair vs PNA AHRF 2/2 ILD flair vs PNA

## 2024-12-14 NOTE — PROGRESS NOTES
"Johanne Acosta is a 69 y.o. female on day 1 of admission presenting with Radiculopathy.    Subjective   Patient has some neck swelling and tenderness like incision.  Drain overnight stripped and 80 cc out after that.  Satting well and hemodynamically stable.  Able to talk.  Dysphagia present.  Strength preserved.  Mainly pain complaints.  Discussed that we will trial soft foods and if unable to eat then probably will require a feeding tube tomorrow       Objective     Physical Exam  BUE D/B/T/HG5  BLE 5/5  Inc with glue is clean, mildly tense  Drain stripped again at bedside    Last Recorded Vitals  Blood pressure 150/82, pulse 93, temperature 36.8 °C (98.2 °F), temperature source Temporal, resp. rate 16, height 1.651 m (5' 5\"), weight 84.4 kg (186 lb), SpO2 96%.     Intake/Output last 3 Shifts:  I/O last 3 completed shifts:  In: 3028.6 (35.9 mL/kg) [I.V.:2978.6 (35.3 mL/kg); IV Piggyback:50]  Out: 1485 (17.6 mL/kg) [Urine:1255 (0.4 mL/kg/hr); Drains:80; Blood:150]  Weight: 84.4 kg     Relevant Results  Lab Results   Component Value Date    WBC 5.9 09/01/2024    HGB 8.9 (L) 09/01/2024    HCT 25.8 (L) 09/01/2024    MCV 94 09/01/2024     09/01/2024      Lab Results   Component Value Date    GLUCOSE 147 (H) 09/01/2024    CALCIUM 8.3 (L) 09/01/2024     09/01/2024    K 3.1 (L) 09/01/2024    CO2 25 09/01/2024     09/01/2024    BUN 12 09/01/2024    CREATININE 0.64 09/01/2024      Lab Results   Component Value Date    CREATININE 0.64 09/01/2024    BUN 12 09/01/2024     09/01/2024    K 3.1 (L) 09/01/2024     09/01/2024    CO2 25 09/01/2024      Lab Results   Component Value Date    CALCIUM 8.3 (L) 09/01/2024    PHOS 2.6 01/13/2021      Lab Results   Component Value Date    INR 0.9 01/05/2021    PROTIME 10.4 01/05/2021                                     Assessment/Plan   Assessment & Plan  Radiculopathy    Cervical spondylosis with radiculopathy    Cervical spondylosis with " myelopathy    Degenerative cervical spinal stenosis    Patient is a 69-year-old female with history of cervical myelopathy and cervical radiculopathy who underwent a C3-C6 anterior cervical discectomy and fusion on December 13 with Dr. Neri.  Postoperatively with dysphagia and small cervical hematoma.  Hemodynamically stable.  Drain stripped with immediate outflow of 80 cc over the next few hours.  Will trial soft diet and if unable to swallow or has difficulty with soft foods, possibility for feeding tube tomorrow.     Floor  Tele with cont pulse ox  Soft diet as tolerated  Dexamethasone 4 mg every 8 hours, pepcid while on steroids  PO pain regimen, dilaudid for breakthrough  Maintain drain  Lateral XR of C spine,   PTOT  SCD, SQH           Nael Bassett MD

## 2024-12-14 NOTE — CARE PLAN
The patient's goals for the shift include      The clinical goals for the shift include pain management, maintain airway      Problem: Pain - Adult  Goal: Verbalizes/displays adequate comfort level or baseline comfort level  Outcome: Progressing     Problem: Safety - Adult  Goal: Free from fall injury  Outcome: Progressing     Problem: Chronic Conditions and Co-morbidities  Goal: Patient's chronic conditions and co-morbidity symptoms are monitored and maintained or improved  Outcome: Progressing     Pt remained safe throughout the shift.  Bed alarm on, reminded pt to call for assistance when getting out of bed.

## 2024-12-14 NOTE — CARE PLAN
The patient's goals for the shift include      The clinical goals for the shift include pain management, maintain airway    Over the shift, the patient did make progress toward the following goals of pain management and maintained airway.

## 2024-12-14 NOTE — NURSING NOTE
"  1930 - RN responded to patient's bed alarm, pt. Found standing next to bed pointing at her dinner tray aggressively demanding to eat and be dressed in her Pjs. Explained to patient multiple times that she could not have food since she was coughing when she drank anything. Much emotional reassurance was given to patient, Pjs were found and patient was dressed in them. Neck ANGELINA drain tube was found disconnected from bulb, reattached by RN and secured with tape in order to achieve suction and maintain bulb suction of drain.   Secure Text sent to [facility] NP:   Felicity - Ms. Acosta is very anxious and said that she knows she got steroids because they make her anxious and she \"told them to give her something for anxiety\" if they gave her steroids. She has already stood up out of bed and almost fallen trying to get to her tray. Evidently Dr. Neri stripped the ANGELINA drain which wasn't working earlier and he felt her voice was better after he got some drainage out. She does cough some when she drinks from a straw. She got irritated with me when I asked her orientation questions and demanded to be put in her Pjs, so she is now wearing a full set of pajamas and her underwear. She is not disoriented as far as I can tell, just agitated seemingly from her anxiety. She was also very irritated that someone took her underwear off (I explained they do that in the OR when you have surgery). No complaints of pain.   Sent: Fri 20:13 to Felicity Stallings, YUMIKO-CNP  2000-2030 - RN at bedside, emotional support provided, assisted patient to find position in bed that was comfortable with pillows supporting her neck, applied ice to back of neck, assisted to bedside commode, continent of urine, pt. Resting comfortably, order for bedside swallow eval received     2225 went to patient's room to complete swallow eval, ANGELINA drain found disconnected and neck visually more swollen, message sent to INDY Stallings:     so I just went in to her room, and " her ANGELINA drain was disconnected. She told me she was pulling on it because she thought it was her oxygen or pajamas or tele, now when I put the tube back into the bulb, I can't get it to maintain bulb suction AND her neck visually is more swollen than it was before. Grisel left his cell phone number to call if she has an airway problem. The OR luis antonio came up around 2100 to make sure her ANGELINA drain was working because earlier it wasn't, at that time the bulb suction was holding. I haven't done the swallow screen since I saw her swollen neck when I went in to do it. Do you want to come see her, or should I just call Dr. Neri? Or is there someone else to call? Thank you. Sunshine     Fri 22:55  LANDY Gomez  I'd try replacing the suction bulb and call grisel whether it holds or not, there really isn't much I can help with in terms of surgical/airway needs and he needs to be notified of all this. sounds like you have your hands full tonight     Fri 22:57  she just said it feels like her throat feels when she gets a shot of penicillin    Fri 23:07  LANDY Gomez  if she's having airway compromise then call dr neri like he asked, is she satting ok?    Fri 23:08  I am on the phone with him right now.    Fri 23:08  he said he's going to come in. she just said she feels like her throat is having trouble.     Fri 23:20  LANDY Gomez    I see orders from dr neri    00:04  yes, he came in., we got the ANGELINA to hold suction, still not much out. he doesn't feel it is significantly more swollen than earlier when he saw her, but did order a cervical spine CT, along w/ muscle relaxant since she's also having pain and the sensation of her throat shrinking. I'm hoping the valium helps with the reaction when I give her steroids again in an hour.     00:09  LANDY Gomez  that sounds very reasonable, I'm glad you got a hold of him    00:10  she is due for more  "steroids now, her BP is 184/96 , and she is complaining of 7/10 pain. Dr. Neri wanted her to get the next dose of steroids, am I okay to give her the steroids in addition to morphine for the pain?       02:03  IKE Stallings, APRN-CNP  yes definitely give the steroids, airway is highest priority    02:04  okay, thank you!       0300 Patient passed Water Swallow Screen, pt. Reporting improved breathing and \"it feels like the snoring sensation in my throat is going down\", tolerated PO oxycodone, stripped ANGELINA drain tubing for more drainage    0400 - stripped ANGELINA drain tubing    0600 - stripped ANGELINA drain tubing            "

## 2024-12-14 NOTE — PROGRESS NOTES
Occupational Therapy    Evaluation    Patient Name: Johanne Acosta  MRN: [id number]  Today's Date: 12/14/2024  Time Calculation  Start Time: 1005  Stop Time: 1015  Time Calculation (min): 10 min  4102/4102-A  Eval only     Assessment  IP OT Assessment  Prognosis: Good  Medical Staff Made Aware: Yes  End of Session Communication: Bedside nurse  End of Session Patient Position: Up in chair  Patient presents with decline in ADLs, functional transfers, functional mobility and would benefit from OT during acute stay to improve functional independence and safety. Recommend low intensity OT to maximize functional independence and safety.     Plan:  Treatment Interventions: ADL retraining, Functional transfer training, Patient/family training, Equipment evaluation/education, Compensatory technique education  OT Frequency: Daily  OT Discharge Recommendations: Low intensity level of continued care  Equipment Recommended upon Discharge: Wheeled walker (shower chair)  OT - OK to Discharge: Yes from acute care OT services to the next level of care when cleared by medical team      Subjective   Current Problem:  1. Cervical spondylosis with radiculopathy  Insert and maintain peripheral IV    Saline lock IV    Type And Screen    Pulse oximetry, spot    povidone-iodine 5 % kit kit    Admit to inpatient    Insert and maintain peripheral IV    Saline lock IV    Pulse oximetry, spot    Admit to inpatient    Type And Screen    CANCELED: NPO Diet Except: Sips with meds; Effective now    CANCELED: Height and weight    CANCELED: Vital Signs    CANCELED: Apply sequential compression device    CANCELED: Apply CHANDRAKANT hose    CANCELED: DNR and No Intubation and No ICU    CANCELED: NPO Diet Except: Sips with meds; Effective now    CANCELED: Height and weight    CANCELED: Vital Signs    CANCELED: Apply sequential compression device    CANCELED: Apply CHANDRAKANT hose    CANCELED: DNR and No Intubation and No ICU      2. Cervical spondylosis with myelopathy   Insert and maintain peripheral IV    Saline lock IV    Type And Screen    Pulse oximetry, spot    povidone-iodine 5 % kit kit    Admit to inpatient    Insert and maintain peripheral IV    Saline lock IV    Pulse oximetry, spot    Admit to inpatient    Type And Screen    CANCELED: NPO Diet Except: Sips with meds; Effective now    CANCELED: Height and weight    CANCELED: Vital Signs    CANCELED: Apply sequential compression device    CANCELED: Apply CHANDRAKANT hose    CANCELED: DNR and No Intubation and No ICU    CANCELED: NPO Diet Except: Sips with meds; Effective now    CANCELED: Height and weight    CANCELED: Vital Signs    CANCELED: Apply sequential compression device    CANCELED: Apply CHANDRAKANT hose    CANCELED: DNR and No Intubation and No ICU      3. Postoperative pain after spinal surgery            General:  General  Reason for Referral: ADLs, recent spine surgery  Referred By: Jer Neri MD  Past Medical History Relevant to Rehab: Admitted 12/13/2024 after having the following completed:C3-4, C4-5, and C5-6 Fusion Spine Anterior Cervical and Discectomy; C3-6 anterior plate PMH: GERD, HTN, dementia, PTSD, radiculopathy  Co-Treatment: PT  Co-Treatment Reason: for safety  Prior to Session Communication: Bedside nurse  Patient Position Received:  (sidelying in bed)  Preferred Learning Style: verbal  General Comment: Patient in sidelying in bed and agreeable to participate in OT evaluation  Lines: ANGELINA drain, telemetry     Precautions:  Medical Precautions: Fall precautions  Post-Surgical Precautions: Spinal precautions    Pain:  Pain Assessment  Pain Assessment: 0-10  0-10 (Numeric) Pain Score: 7  Pain Type: Surgical pain  Pain Location: Neck  Pain Interventions: Rest    Objective   Cognition:  Overall Cognitive Status: Within Functional Limits  Safety/Judgement:  (decreased safety awareness)  Insight: Moderate    Home Living:  Home Living Comments: Patient has been at SNF.  Was not active with therapy     Prior  Function:  Prior Function Comments: Reports was independent without assistive device functional mobility tasks.  Was independent with all ADLs.    ADL:  LE Dressing Assistance: Minimal (anticipated)    Activity Tolerance:  Endurance: Endurance does not limit participation in activity    Bed Mobility/Transfers:   Bed Mobility  Bed Mobility:  (supervision supine to sit with log roll)  Transfers  Transfer:  (SBA sit <>stand)    Ambulation/Gait Training:  Functional Mobility  Functional Mobility Performed:  (SBA without assistive device functional mobility task. Patient refused to utilize WW)    Extremities: RUE   RUE : Within Functional Limits and LUE   LUE: Within Functional Limits    Outcome Measures: [facility] Daily Activity  Putting on and taking off regular lower body clothing: A little  Bathing (including washing, rinsing, drying): A little  Putting on and taking off regular upper body clothing: None  Toileting, which includes using toilet, bedpan or urinal: A little  Taking care of personal grooming such as brushing teeth: A little  Eating Meals: None  Daily Activity - Total Score: 20      EDUCATION:  Education  Individual(s) Educated: Patient  Education Provided: Fall precautons (safety with transfers)  Patient Response to Education: Patient/Caregiver Verbalized Understanding of Information    Goals:   Encounter Problems       Encounter Problems (Active)       Dressings Lower Extremities       STG - Patient will complete lower body dressing independently with AE and comp strategies (Progressing)       Start:  12/14/24    Expected End:  12/28/24               Functional Balance       STG-Patient will be independent with dynamic stand task >5 minutes for ADL completion   (Progressing)       Start:  12/14/24    Expected End:  12/28/24               Functional Mobility       STG-Patient will be independent with assistive device PRN functional mobility tasks   (Progressing)       Start:  12/14/24    Expected End:   12/28/24               OT Transfers       STG-Patient will be independent with functional transfers demonstrating good safety   (Progressing)       Start:  12/14/24    Expected End:  12/28/24               Safety       STG-Patient will independently verbalize spinal precautions with all functional tasks   (Progressing)       Start:  12/14/24    Expected End:  12/28/24
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
## 2024-12-15 LAB
ALBUMIN SERPL BCP-MCNC: 4 G/DL (ref 3.4–5)
ANION GAP SERPL CALC-SCNC: 12 MMOL/L (ref 10–20)
BUN SERPL-MCNC: 22 MG/DL (ref 6–23)
CALCIUM SERPL-MCNC: 9.2 MG/DL (ref 8.6–10.3)
CHLORIDE SERPL-SCNC: 98 MMOL/L (ref 98–107)
CO2 SERPL-SCNC: 30 MMOL/L (ref 21–32)
CREAT SERPL-MCNC: 0.76 MG/DL (ref 0.5–1.05)
EGFRCR SERPLBLD CKD-EPI 2021: 86 ML/MIN/1.73M*2
ERYTHROCYTE [DISTWIDTH] IN BLOOD BY AUTOMATED COUNT: 14.2 % (ref 11.5–14.5)
GLUCOSE BLD MANUAL STRIP-MCNC: 201 MG/DL (ref 74–99)
GLUCOSE BLD MANUAL STRIP-MCNC: 231 MG/DL (ref 74–99)
GLUCOSE SERPL-MCNC: 173 MG/DL (ref 74–99)
HCT VFR BLD AUTO: 35.9 % (ref 36–46)
HGB BLD-MCNC: 11.1 G/DL (ref 12–16)
MCH RBC QN AUTO: 29.8 PG (ref 26–34)
MCHC RBC AUTO-ENTMCNC: 30.9 G/DL (ref 32–36)
MCV RBC AUTO: 96 FL (ref 80–100)
NRBC BLD-RTO: 0 /100 WBCS (ref 0–0)
PHOSPHATE SERPL-MCNC: 2.9 MG/DL (ref 2.5–4.9)
PLATELET # BLD AUTO: 229 X10*3/UL (ref 150–450)
POTASSIUM SERPL-SCNC: 4.4 MMOL/L (ref 3.5–5.3)
RBC # BLD AUTO: 3.73 X10*6/UL (ref 4–5.2)
SODIUM SERPL-SCNC: 136 MMOL/L (ref 136–145)
WBC # BLD AUTO: 6.8 X10*3/UL (ref 4.4–11.3)

## 2024-12-15 PROCEDURE — 82947 ASSAY GLUCOSE BLOOD QUANT: CPT

## 2024-12-15 PROCEDURE — 2500000001 HC RX 250 WO HCPCS SELF ADMINISTERED DRUGS (ALT 637 FOR MEDICARE OP): Performed by: STUDENT IN AN ORGANIZED HEALTH CARE EDUCATION/TRAINING PROGRAM

## 2024-12-15 PROCEDURE — 1200000002 HC GENERAL ROOM WITH TELEMETRY DAILY

## 2024-12-15 PROCEDURE — 2500000004 HC RX 250 GENERAL PHARMACY W/ HCPCS (ALT 636 FOR OP/ED): Performed by: STUDENT IN AN ORGANIZED HEALTH CARE EDUCATION/TRAINING PROGRAM

## 2024-12-15 PROCEDURE — 36415 COLL VENOUS BLD VENIPUNCTURE: CPT | Performed by: STUDENT IN AN ORGANIZED HEALTH CARE EDUCATION/TRAINING PROGRAM

## 2024-12-15 PROCEDURE — 97535 SELF CARE MNGMENT TRAINING: CPT | Mod: GO,CO

## 2024-12-15 PROCEDURE — 2500000005 HC RX 250 GENERAL PHARMACY W/O HCPCS: Performed by: STUDENT IN AN ORGANIZED HEALTH CARE EDUCATION/TRAINING PROGRAM

## 2024-12-15 PROCEDURE — 80069 RENAL FUNCTION PANEL: CPT | Performed by: STUDENT IN AN ORGANIZED HEALTH CARE EDUCATION/TRAINING PROGRAM

## 2024-12-15 PROCEDURE — 85027 COMPLETE CBC AUTOMATED: CPT | Performed by: STUDENT IN AN ORGANIZED HEALTH CARE EDUCATION/TRAINING PROGRAM

## 2024-12-15 PROCEDURE — 2500000002 HC RX 250 W HCPCS SELF ADMINISTERED DRUGS (ALT 637 FOR MEDICARE OP, ALT 636 FOR OP/ED): Performed by: STUDENT IN AN ORGANIZED HEALTH CARE EDUCATION/TRAINING PROGRAM

## 2024-12-15 ASSESSMENT — COGNITIVE AND FUNCTIONAL STATUS - GENERAL
DRESSING REGULAR LOWER BODY CLOTHING: A LITTLE
HELP NEEDED FOR BATHING: A LITTLE
CLIMB 3 TO 5 STEPS WITH RAILING: A LITTLE
TURNING FROM BACK TO SIDE WHILE IN FLAT BAD: A LITTLE
MOBILITY SCORE: 17
DAILY ACTIVITIY SCORE: 19
PERSONAL GROOMING: A LITTLE
TURNING FROM BACK TO SIDE WHILE IN FLAT BAD: A LITTLE
WALKING IN HOSPITAL ROOM: A LITTLE
STANDING UP FROM CHAIR USING ARMS: A LITTLE
MOBILITY SCORE: 18
DRESSING REGULAR UPPER BODY CLOTHING: A LITTLE
TOILETING: A LITTLE
TOILETING: A LITTLE
WALKING IN HOSPITAL ROOM: A LITTLE
DAILY ACTIVITIY SCORE: 20
DRESSING REGULAR LOWER BODY CLOTHING: A LITTLE
STANDING UP FROM CHAIR USING ARMS: A LITTLE
MOVING FROM LYING ON BACK TO SITTING ON SIDE OF FLAT BED WITH BEDRAILS: A LITTLE
CLIMB 3 TO 5 STEPS WITH RAILING: A LOT
PERSONAL GROOMING: A LITTLE
HELP NEEDED FOR BATHING: A LITTLE
MOVING TO AND FROM BED TO CHAIR: A LITTLE
MOVING FROM LYING ON BACK TO SITTING ON SIDE OF FLAT BED WITH BEDRAILS: A LITTLE
MOVING TO AND FROM BED TO CHAIR: A LITTLE

## 2024-12-15 ASSESSMENT — PAIN SCALES - GENERAL
PAINLEVEL_OUTOF10: 4
PAINLEVEL_OUTOF10: 0 - NO PAIN
PAINLEVEL_OUTOF10: 0 - NO PAIN
PAINLEVEL_OUTOF10: 7
PAINLEVEL_OUTOF10: 6
PAINLEVEL_OUTOF10: 7
PAINLEVEL_OUTOF10: 8
PAINLEVEL_OUTOF10: 6
PAINLEVEL_OUTOF10: 7
PAINLEVEL_OUTOF10: 6
PAINLEVEL_OUTOF10: 5 - MODERATE PAIN
PAINLEVEL_OUTOF10: 4

## 2024-12-15 ASSESSMENT — PAIN - FUNCTIONAL ASSESSMENT
PAIN_FUNCTIONAL_ASSESSMENT: 0-10

## 2024-12-15 ASSESSMENT — PAIN DESCRIPTION - LOCATION
LOCATION: NECK

## 2024-12-15 ASSESSMENT — PAIN DESCRIPTION - ORIENTATION: ORIENTATION: POSTERIOR

## 2024-12-15 ASSESSMENT — ACTIVITIES OF DAILY LIVING (ADL): HOME_MANAGEMENT_TIME_ENTRY: 29

## 2024-12-15 NOTE — NURSING NOTE
"2015: This RN walked into patients room as bed alarm was going off, patient was found in a prone position with her arms extended to the floor. This RN advised patient to lie down in a supine position. \"This is the only way that I am able to relieve my pain, nobody around here listens to what I have to say\" Patient stated. Patient became agitated and impulsive. After many attempts of therapeutic communication patient agreed to lie down and cooperate with this RN.  notified. No new orders.   2040: Patient is lying down in bed, pain is now tolerable. Bed alarm on, call light within reach.   "

## 2024-12-15 NOTE — CARE PLAN
The patient's goals for the shift include      The clinical goals for the shift include Pt will have pain remain at a manageable level this shift.    Problem: Pain - Adult  Goal: Verbalizes/displays adequate comfort level or baseline comfort level  Outcome: Progressing     Problem: Pain  Goal: Takes deep breaths with improved pain control throughout the shift  Outcome: Progressing  Goal: Turns in bed with improved pain control throughout the shift  Outcome: Progressing  Goal: Walks with improved pain control throughout the shift  Outcome: Progressing  Goal: Performs ADL's with improved pain control throughout shift  Outcome: Progressing  Goal: Participates in PT with improved pain control throughout the shift  Outcome: Progressing  Goal: Free from opioid side effects throughout the shift  Outcome: Progressing  Goal: Free from acute confusion related to pain meds throughout the shift  Outcome: Progressing   Pt has required PRN pain med x 1 with good results this shift.
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
## 2024-12-15 NOTE — PROGRESS NOTES
"Johanne Acosta is a 69 y.o. female on day 2 of admission presenting with Radiculopathy.    Subjective   Some pain issues though much improved.  Is swallowing and vocalizing fine.  Hoarseness much improved.  Overall dysphagia and odynophagia much improved.  Some local swelling around the incision that has gotten better since yesterday.  Roughly 60 cc of drain output since yesterday.  Was able to eat soft foods as well as meatloaf, has not trouble with solid.       Objective     Physical Exam  BUE D/B/T/HG5  BLE 5/5  Inc with glue is clean, mildly tense, no bruising      Last Recorded Vitals  Blood pressure 156/79, pulse 82, temperature 36.3 °C (97.3 °F), temperature source Temporal, resp. rate 16, height 1.651 m (5' 5\"), weight 84.4 kg (186 lb), SpO2 96%.     Intake/Output last 3 Shifts:  I/O last 3 completed shifts:  In: 2541.7 (30.1 mL/kg) [P.O.:1320; I.V.:1221.7 (14.5 mL/kg)]  Out: 1265 (15 mL/kg) [Urine:1125 (0.4 mL/kg/hr); Drains:140]  Weight: 84.4 kg     Relevant Results  Lab Results   Component Value Date    WBC 6.8 12/15/2024    HGB 11.1 (L) 12/15/2024    HCT 35.9 (L) 12/15/2024    MCV 96 12/15/2024     12/15/2024      Lab Results   Component Value Date    GLUCOSE 173 (H) 12/15/2024    CALCIUM 9.2 12/15/2024     12/15/2024    K 4.4 12/15/2024    CO2 30 12/15/2024    CL 98 12/15/2024    BUN 22 12/15/2024    CREATININE 0.76 12/15/2024      Lab Results   Component Value Date    CREATININE 0.76 12/15/2024    BUN 22 12/15/2024     12/15/2024    K 4.4 12/15/2024    CL 98 12/15/2024    CO2 30 12/15/2024      Lab Results   Component Value Date    CALCIUM 9.2 12/15/2024    PHOS 2.9 12/15/2024      Lab Results   Component Value Date    INR 0.9 01/05/2021    PROTIME 10.4 01/05/2021                                     Assessment/Plan   Assessment & Plan  Radiculopathy    Cervical spondylosis with radiculopathy    Cervical spondylosis with myelopathy    Degenerative cervical spinal stenosis    Patient is a " 69-year-old female with history of cervical myelopathy and cervical radiculopathy who underwent a C3-C6 anterior cervical discectomy and fusion on December 13 with Dr. Neri.  Postoperatively with dysphagia and small cervical hematoma.  Hemodynamically stable.  Made NPO with 80cc of drain output, steroids started    POD1- advanced to soft diet and advanced.   POD2- Dysphagia and odynophagia improved.       Floor  Tele with cont pulse ox  ADAT  Dexamethasone 4 mg every 8 hours, pepcid while on steroids  PO pain regimen, dilaudid for breakthrough  Maintain drain    PTOT-low intensity with wheeled walker  SCD, SQH           Nael Bassett MD

## 2024-12-15 NOTE — PROGRESS NOTES
"Occupational Therapy    OT Treatment    Patient Name: Johanne Acosta  MRN: [id number]  Today's Date: 12/15/2024  Time Calculation  Start Time: 0941  Stop Time: 1010  Time Calculation (min): 29 min       4102/4102-A    Assessment:  End of Session Communication: Bedside nurse  End of Session Patient Position: Bed, 2 rail up, Alarm on (sitting EOB to order breakfast, nursing in room upon SALAZAR exit)       Plan:  OT Frequency: Daily  OT Discharge Recommendations: Low intensity level of continued care  Equipment Recommended upon Discharge: Wheeled walker (shower chair, reacher, sock aide)     Subjective      12/15/24 0941   OT Last Visit   OT Received On 12/15/24   General   Reason for Referral ADLs, recent spine surgery   Referred By Jer Neri MD   Past Medical History Relevant to Rehab Admitted 12/13/2024 after having the following completed:C3-4, C4-5, and C5-6 Fusion Spine Anterior Cervical and Discectomy; C3-6 anterior plate PMH: GERD, HTN, dementia, PTSD, radiculopathy   Prior to Session Communication Bedside nurse   Patient Position Received Bed, 2 rail up;Alarm on   General Comment Pt sidelying toward R side in bed, agreeable to tx with encouragment and education in benefits of OOB as pt initially agreeable, then asks \"Why am I getting up this early?\"     Pt intermittetly agitated, swats toward therapist while providing SBA for safety as pt refused gait belt. Pt states \"just leave me be\" and often referring to not wanting or needing help. Reiterated SBA for safety following recent spine surgery. Pt voicing agitation is due to steroids.   Precautions   Medical Precautions Fall precautions   Post-Surgical Precautions Spinal precautions   Precautions Comment ANGELINA drain which pt often getting tubing caught in hands, cued to prevent pulling.   Pain Assessment   0-10 (Numeric) Pain Score 4   Pain Type Surgical pain   Pain Location Neck  (also c/o lower back pain- reports is higher than neck pain but did not rate)   Pain " Orientation Posterior   Pain Interventions Repositioned;Medication (See MAR)  (nursing addressing)   Cognition   Overall Cognitive Status Impaired   Orientation Level Oriented X4   Safety Judgment Decreased awareness of need for safety precautions   Insight Moderate   Impulsive Moderately   Grooming   Grooming Level of Assistance Close supervision   Grooming Where Assessed Standing sinkside   Grooming Comments Educated in comp techniques and maintaining precautions with various grooming tasks with poor follow through.   Toileting   Toileting Level of Assistance Close supervision   Where Assessed Toilet   Toileting Comments SBA provided during jena care from seated level and garment management in stance.   Bed Mobility   Bed Mobility Yes   Bed Mobility 1   Bed Mobility 1 Rolling left   Level of Assistance 1 Distant supervision   Bed Mobility Comments 1 Pt sidelying on R upon arrival, rolled toward L side via log roll.   Bed Mobility 2   Bed Mobility  2 Log roll;Side lying left to sit   Level of Assistance 2 Close supervision   Bed Mobility Comments 2 Pt completed to sit transition quickly with UE suport on armreset of chair next to bed; educated in pacing and slow positional changes. Pt demo'd follow through with spine precautions during bed mobility.   Functional Mobility   Functional Mobility Performed Pt ambulated within room, to and from bathroom x2 trials, SBA for safety, pt refused gait belt.)   Transfers   Transfer Yes   Transfer 1   Technique 1 Sit to stand;Stand to sit   Transfer Level of Assistance 1 Close supervision   Trials/Comments 1 STS at various surfaces including EOB and standard toilet   Dynamic Standing Balance   Dynamic Standing-Balance Support No upper extremity supported   Dynamic Standing-Level of Assistance Close supervision   Dynamic Standing-Balance Forward lean;Reaching for objects   Dynamic Standing-Comments pt dropped comb to floor level, this SALAZAR going to  however pt quickly  reached to floor level to obtain, no UE support, close SBA for safety   Other Activity   Other Activity Performed Educated in spinal precautions, poor follow through during tx despite cues   IP OT Assessment   End of Session Communication Bedside nurse   End of Session Patient Position Bed, 2 rail up;Alarm on  (sitting EOB to order breakfast, nursing in room upon SALAZAR exit)   Inpatient Plan   OT Frequency Daily   OT Discharge Recommendations Low intensity level of continued care   Equipment Recommended upon Discharge Wheeled walker  (shower chair, reacher, sock aide)     Outcome Measures:[facility] Daily Activity  Putting on and taking off regular lower body clothing: A little  Bathing (including washing, rinsing, drying): A little  Putting on and taking off regular upper body clothing: None  Toileting, which includes using toilet, bedpan or urinal: A little  Taking care of personal grooming such as brushing teeth: A little  Eating Meals: None  Daily Activity - Total Score: 20  Education Documentation  No documentation found.  Education Comments  No comments found.            Goals:  Encounter Problems       Encounter Problems (Active)       Dressings Lower Extremities       STG - Patient will complete lower body dressing independently with AE and comp strategies (Progressing)       Start:  12/14/24    Expected End:  12/28/24               Functional Balance       STG-Patient will be independent with dynamic stand task >5 minutes for ADL completion   (Progressing)       Start:  12/14/24    Expected End:  12/28/24               Functional Mobility       STG-Patient will be independent with assistive device PRN functional mobility tasks   (Progressing)       Start:  12/14/24    Expected End:  12/28/24               OT Transfers       STG-Patient will be independent with functional transfers demonstrating good safety   (Progressing)       Start:  12/14/24    Expected End:  12/28/24               Safety       STG-Patient  will independently verbalize spinal precautions with all functional tasks   (Progressing)       Start:  12/14/24    Expected End:  12/28/24

## 2024-12-16 VITALS
RESPIRATION RATE: 18 BRPM | WEIGHT: 186 LBS | DIASTOLIC BLOOD PRESSURE: 88 MMHG | BODY MASS INDEX: 30.99 KG/M2 | OXYGEN SATURATION: 97 % | HEART RATE: 84 BPM | TEMPERATURE: 98.2 F | HEIGHT: 65 IN | SYSTOLIC BLOOD PRESSURE: 156 MMHG

## 2024-12-16 LAB — GLUCOSE BLD MANUAL STRIP-MCNC: 145 MG/DL (ref 74–99)

## 2024-12-16 PROCEDURE — 2500000004 HC RX 250 GENERAL PHARMACY W/ HCPCS (ALT 636 FOR OP/ED): Performed by: STUDENT IN AN ORGANIZED HEALTH CARE EDUCATION/TRAINING PROGRAM

## 2024-12-16 PROCEDURE — 2500000001 HC RX 250 WO HCPCS SELF ADMINISTERED DRUGS (ALT 637 FOR MEDICARE OP): Performed by: STUDENT IN AN ORGANIZED HEALTH CARE EDUCATION/TRAINING PROGRAM

## 2024-12-16 PROCEDURE — 82947 ASSAY GLUCOSE BLOOD QUANT: CPT

## 2024-12-16 PROCEDURE — 2500000002 HC RX 250 W HCPCS SELF ADMINISTERED DRUGS (ALT 637 FOR MEDICARE OP, ALT 636 FOR OP/ED): Performed by: STUDENT IN AN ORGANIZED HEALTH CARE EDUCATION/TRAINING PROGRAM

## 2024-12-16 RX ORDER — CYCLOBENZAPRINE HCL 10 MG
10 TABLET ORAL 3 TIMES DAILY PRN
Qty: 21 TABLET | Refills: 0 | Status: SHIPPED | OUTPATIENT
Start: 2024-12-16 | End: 2024-12-23

## 2024-12-16 RX ORDER — OXYCODONE HYDROCHLORIDE 5 MG/1
5 TABLET ORAL EVERY 6 HOURS PRN
Qty: 12 TABLET | Refills: 0 | Status: SHIPPED | OUTPATIENT
Start: 2024-12-16 | End: 2024-12-19

## 2024-12-16 RX ORDER — LIDOCAINE 560 MG/1
1 PATCH PERCUTANEOUS; TOPICAL; TRANSDERMAL DAILY
Qty: 7 PATCH | Refills: 0 | Status: SHIPPED | OUTPATIENT
Start: 2024-12-16

## 2024-12-16 RX ORDER — OXYCODONE HYDROCHLORIDE 5 MG/1
5 TABLET ORAL EVERY 6 HOURS PRN
Qty: 28 TABLET | Refills: 0 | Status: SHIPPED | OUTPATIENT
Start: 2024-12-16 | End: 2024-12-23

## 2024-12-16 RX ORDER — AMOXICILLIN 250 MG
2 CAPSULE ORAL 2 TIMES DAILY
Qty: 56 TABLET | Refills: 0 | Status: SHIPPED | OUTPATIENT
Start: 2024-12-16 | End: 2024-12-30

## 2024-12-16 RX ORDER — METHYLPREDNISOLONE 4 MG/1
TABLET ORAL
Qty: 21 TABLET | Refills: 0 | Status: SHIPPED | OUTPATIENT
Start: 2024-12-16 | End: 2024-12-22

## 2024-12-16 ASSESSMENT — PAIN SCALES - GENERAL
PAINLEVEL_OUTOF10: 7
PAINLEVEL_OUTOF10: 7
PAINLEVEL_OUTOF10: 5 - MODERATE PAIN
PAINLEVEL_OUTOF10: 4

## 2024-12-16 ASSESSMENT — PAIN - FUNCTIONAL ASSESSMENT
PAIN_FUNCTIONAL_ASSESSMENT: 0-10

## 2024-12-16 ASSESSMENT — PAIN DESCRIPTION - LOCATION
LOCATION: NECK
LOCATION: NECK

## 2024-12-16 NOTE — CARE PLAN
Problem: Pain - Adult  Goal: Verbalizes/displays adequate comfort level or baseline comfort level  12/16/2024 1413 by Maryjane Hernández RN  Outcome: Adequate for Discharge  12/16/2024 1000 by Maryjane Hernández RN  Outcome: Progressing     Problem: Safety - Adult  Goal: Free from fall injury  12/16/2024 1413 by Maryjane Hernández RN  Outcome: Adequate for Discharge  12/16/2024 1000 by Maryjane Hernández RN  Outcome: Progressing     Problem: Discharge Planning  Goal: Discharge to home or other facility with appropriate resources  12/16/2024 1413 by Maryjane Hernández RN  Outcome: Adequate for Discharge  12/16/2024 1000 by Maryjane Hernández RN  Outcome: Progressing     Problem: Chronic Conditions and Co-morbidities  Goal: Patient's chronic conditions and co-morbidity symptoms are monitored and maintained or improved  12/16/2024 1413 by Maryjane Hernández RN  Outcome: Adequate for Discharge  12/16/2024 1000 by Maryjane Hernández RN  Outcome: Progressing     Problem: Pain  Goal: Takes deep breaths with improved pain control throughout the shift  12/16/2024 1413 by Maryjane Hernández RN  Outcome: Adequate for Discharge  12/16/2024 1000 by Maryjane Hernández RN  Outcome: Progressing  Goal: Turns in bed with improved pain control throughout the shift  12/16/2024 1413 by Maryjane Hernández RN  Outcome: Adequate for Discharge  12/16/2024 1000 by Maryjane Hernández RN  Outcome: Progressing  Goal: Walks with improved pain control throughout the shift  12/16/2024 1413 by Maryjane Hernández RN  Outcome: Adequate for Discharge  12/16/2024 1000 by Maryjane Hernández RN  Outcome: Progressing  Goal: Performs ADL's with improved pain control throughout shift  12/16/2024 1413 by Maryjane Hernández RN  Outcome: Adequate for Discharge  12/16/2024 1000 by Maryjane Hernández RN  Outcome: Progressing  Goal: Participates in PT with improved pain control throughout the shift  12/16/2024 1413 by Maryjane Hernández RN  Outcome: Adequate for Discharge  12/16/2024 1000 by Maryjane Hernández  RN  Outcome: Progressing  Goal: Free from opioid side effects throughout the shift  12/16/2024 1413 by Maryjane Hernández RN  Outcome: Adequate for Discharge  12/16/2024 1000 by Maryjane Hernández RN  Outcome: Progressing  Goal: Free from acute confusion related to pain meds throughout the shift  12/16/2024 1413 by Maryjane Hernández RN  Outcome: Adequate for Discharge  12/16/2024 1000 by Maryjane Hernández RN  Outcome: Progressing     Problem: Fall/Injury  Goal: Not fall by end of shift  12/16/2024 1413 by Maryjane Hernández RN  Outcome: Adequate for Discharge  12/16/2024 1000 by Maryjane Hernández RN  Outcome: Progressing  Goal: Be free from injury by end of the shift  12/16/2024 1413 by Maryjane Hernández RN  Outcome: Adequate for Discharge  12/16/2024 1000 by Maryjane Hernández RN  Outcome: Progressing  Goal: Verbalize understanding of personal risk factors for fall in the hospital  12/16/2024 1413 by Maryjane Hernández RN  Outcome: Adequate for Discharge  12/16/2024 1000 by Maryjane Hernández RN  Outcome: Progressing  Goal: Verbalize understanding of risk factor reduction measures to prevent injury from fall in the home  12/16/2024 1413 by Maryjane Hernández RN  Outcome: Adequate for Discharge  12/16/2024 1000 by Maryjane Hernández RN  Outcome: Progressing  Goal: Use assistive devices by end of the shift  12/16/2024 1413 by Maryjane Hernández RN  Outcome: Adequate for Discharge  12/16/2024 1000 by Maryjane Hernández RN  Outcome: Progressing  Goal: Pace activities to prevent fatigue by end of the shift  12/16/2024 1413 by Maryjane Hernández RN  Outcome: Adequate for Discharge  12/16/2024 1000 by Maryjane Hernández RN  Outcome: Progressing     Problem: Dressings Lower Extremities  Goal: STG - Patient will complete lower body dressing independently with AE and comp strategies  12/16/2024 1413 by Maryjane Hernández RN  Outcome: Adequate for Discharge  12/16/2024 1000 by Maryjane Hernández RN  Outcome: Progressing

## 2024-12-16 NOTE — CARE PLAN
The patient's goals for the shift include  Patient will have decrease in pain during the shift    The clinical goals for the shift include Patient will remain safe during the shift    Over the shift, the patient did not make progress toward the following goals. Barriers to progression include Patient still with pain levels of 8 to 9 out of 10.Iv dilaudid and oxycodone given to help control her pain Recommendations to address these barriers include monitor and treat  Problem: Pain - Adult  Goal: Verbalizes/displays adequate comfort level or baseline comfort level  Outcome: Progressing     Problem: Safety - Adult  Goal: Free from fall injury  Outcome: Progressing     Problem: Fall/Injury  Goal: Not fall by end of shift  Outcome: Progressing   .

## 2024-12-16 NOTE — DISCHARGE SUMMARY
Discharge Diagnosis  Radiculopathy    Issues Requiring Follow-Up  none    Test Results Pending At Discharge  Pending Labs       No current pending labs.            Hospital Course  H/o GERD p/w cervical myelopathy, 12/13 s/p C3-C6 ACDF      PLAN    Floor, drain, uprights in AM, PTOT    Pertinent Physical Exam At Time of Discharge  Physical Exam  General: Well developed, awake/alert/oriented x3, no distress, alert and cooperative  Skin: Warm and dry, no lesions, no rashes  ENMT: Mucous membranes moist, no apparent injury, no lesions seen  Head/Neck: Neck Supple, no apparent injury  Respiratory/Thorax: Normal breath sounds with good chest expansion, thorax symmetric  Cardiovascular: No pitting edema, no JVD    Motor Strength: 5/5 Throughout all extremities    Muscle Bulk: Normal and symmetric in all extremities     Paraspinal muscle spasm/tenderness absent.   Midline tenderness absent    Sensation: intact to light touch  Incision cdi         Home Medications     Medication List      START taking these medications     cyclobenzaprine 10 mg tablet; Commonly known as: Flexeril; Take 1 tablet   (10 mg) by mouth 3 times a day as needed for muscle spasms for up to 7   days.   * lidocaine 4 % patch; Place 1 patch over 12 hours on the skin once   daily. Remove & discard patch within 12 hours or as directed by MD.;   Replaces: lidocaine 5 % ointment   methylPREDNISolone 4 mg tablets; Commonly known as: Medrol Dospak;   Follow schedule on package instructions   oxyCODONE 5 mg immediate release tablet; Commonly known as: Roxicodone;   Take 1 tablet (5 mg) by mouth every 6 hours if needed for severe pain (7 -   10) or moderate pain (4 - 6) for up to 7 days.   sennosides-docusate sodium 8.6-50 mg tablet; Commonly known as:   Cady-Colace; Take 2 tablets by mouth 2 times a day for 14 days.  * This list has 1 medication(s) that are the same as other medications   prescribed for you. Read the directions carefully, and ask your doctor  or   other care provider to review them with you.     CHANGE how you take these medications     acetaminophen 325 mg tablet; Commonly known as: Tylenol; What changed:   Another medication with the same name was removed. Continue taking this   medication, and follow the directions you see here.   atorvastatin 40 mg tablet; Commonly known as: Lipitor; Take 1 tablet (40   mg) by mouth once daily.; What changed: when to take this     CONTINUE taking these medications     * buPROPion  mg 24 hr tablet; Commonly known as: Wellbutrin XL   * buPROPion  mg 24 hr tablet; Commonly known as: Wellbutrin XL   diclofenac sodium 1 % gel; Commonly known as: Voltaren   Dulcolax (bisacodyl) 10 mg suppository; Generic drug: bisacodyl   DULoxetine 60 mg DR capsule; Commonly known as: Cymbalta; Take 1 capsule   (60 mg) by mouth once daily.   magnesium hydroxide 400 mg/5 mL suspension; Commonly known as: Milk of   Magnesia   melatonin 10 mg tablet   memantine 10 mg tablet; Commonly known as: Namenda; Take 1 tablet (10   mg) by mouth 2 times a day.   metoprolol tartrate 25 mg tablet; Commonly known as: Lopressor; Take 0.5   tablets (12.5 mg) by mouth 2 times a day. for rapid heart beat /   palpitations   mineral oil enema   multivitamin tablet; Take 1 tablet by mouth once daily.   polyethylene glycol 17 gram packet; Commonly known as: Glycolax, Miralax  * This list has 2 medication(s) that are the same as other medications   prescribed for you. Read the directions carefully, and ask your doctor or   other care provider to review them with you.     STOP taking these medications     Acidophilus capsule; Generic drug: lactobacillus acidophilus   celecoxib 200 mg capsule; Commonly known as: CeleBREX   chlorhexidine 0.12 % solution; Commonly known as: Peridex   clindamycin 300 mg capsule; Commonly known as: Cleocin   flaxseed oiL 1,000 mg capsule   folic acid 1 mg tablet; Commonly known as: Folvite   gabapentin 100 mg capsule;  Commonly known as: Neurontin   lidocaine 5 % ointment; Commonly known as: Xylocaine; Replaced by:   lidocaine 4 % patch   meloxicam 7.5 mg tablet; Commonly known as: Mobic   mupirocin 2 % ointment; Commonly known as: Bactroban   sulfamethoxazole-trimethoprim 800-160 mg tablet; Commonly known as:   Bactrim DS   traMADol 50 mg tablet; Commonly known as: Ultram     ASK your doctor about these medications     gabapentin 300 mg capsule; Commonly known as: Neurontin; Take 1 capsule   (300 mg) by mouth 3 times a day.   * lidocaine 5 % patch; Commonly known as: Lidoderm; Place 1 patch over   12 hours on the skin every 12 hours. Remove & discard patch within 12   hours or as directed by MD.  * This list has 1 medication(s) that are the same as other medications   prescribed for you. Read the directions carefully, and ask your doctor or   other care provider to review them with you.       Outpatient Follow-Up  Future Appointments   Date Time Provider Department Center   1/3/2025 12:30 PM YUMIKO Darling-CNP [id number] [place]   3/11/2025 10:00 AM Jer Neri MD PhD [id number] None       Tommy Goodwin PA-C

## 2024-12-16 NOTE — PROGRESS NOTES
12/16/24 1101   Patient Choice   Patient / Family choosing to utilize agency / facility established prior to hospitalization Yes   Stroke Family Assessment   Stroke Family Assessment Needed No   Intensity of Service   Intensity of Service 0-30 min     Spoke with patient. Discharge is in.Plan is to return to [facility] today.

## 2024-12-16 NOTE — CARE PLAN
Problem: Pain - Adult  Goal: Verbalizes/displays adequate comfort level or baseline comfort level  Outcome: Progressing     Problem: Safety - Adult  Goal: Free from fall injury  Outcome: Progressing     Problem: Discharge Planning  Goal: Discharge to home or other facility with appropriate resources  Outcome: Progressing     Problem: Chronic Conditions and Co-morbidities  Goal: Patient's chronic conditions and co-morbidity symptoms are monitored and maintained or improved  Outcome: Progressing     Problem: Pain  Goal: Takes deep breaths with improved pain control throughout the shift  Outcome: Progressing     Problem: Fall/Injury  Goal: Not fall by end of shift  Outcome: Progressing     Problem: Dressings Lower Extremities  Goal: STG - Patient will complete lower body dressing independently with AE and comp strategies  Outcome: Progressing

## 2024-12-17 ENCOUNTER — NURSING HOME VISIT (OUTPATIENT)
Dept: POST ACUTE CARE | Facility: EXTERNAL LOCATION | Age: 69
End: 2024-12-17
Payer: COMMERCIAL

## 2024-12-17 DIAGNOSIS — F41.9 ANXIETY AND DEPRESSION: Primary | ICD-10-CM

## 2024-12-17 DIAGNOSIS — M47.12 CERVICAL SPONDYLOSIS WITH MYELOPATHY: ICD-10-CM

## 2024-12-17 DIAGNOSIS — F32.A ANXIETY AND DEPRESSION: Primary | ICD-10-CM

## 2024-12-17 DIAGNOSIS — E03.9 HYPOTHYROIDISM, UNSPECIFIED TYPE: ICD-10-CM

## 2024-12-17 DIAGNOSIS — I10 PRIMARY HYPERTENSION: ICD-10-CM

## 2024-12-17 DIAGNOSIS — K21.00 GASTROESOPHAGEAL REFLUX DISEASE WITH ESOPHAGITIS, UNSPECIFIED WHETHER HEMORRHAGE: ICD-10-CM

## 2024-12-17 DIAGNOSIS — E78.2 MIXED HYPERLIPIDEMIA: ICD-10-CM

## 2024-12-17 PROCEDURE — 99305 1ST NF CARE MODERATE MDM 35: CPT | Performed by: INTERNAL MEDICINE

## 2024-12-17 NOTE — LETTER
Patient: Johanne Acosta  : 1955    Encounter Date: 2024    HISTORY & PHYSICAL    Subjective  Chief complaint: Johanne Acosta is a 69 y.o. female who is a long term resident patient being seen and evaluated for multiple medical problems.  Patient presents for weakness    HPI:  Patient is a 69-year-old female who presents with weakness.  She underwent an ACDF of C3-C6.  She was discharged to skilled nursing facility for therapy.  She has no acute issues or complaints today.  No issues per nursing.        Past Medical History:   Diagnosis Date   • Pain in right shoulder 2021    Right shoulder pain   • Personal history of other diseases of the musculoskeletal system and connective tissue 2021    History of low back pain   • Personal history of other diseases of the musculoskeletal system and connective tissue 2021    History of low back pain       Past Surgical History:   Procedure Laterality Date   • ANTERIOR CERVICAL DISCECTOMY W/ FUSION  2024    C3-4, C4-5, and C5-6 Fusion Spine Anterior Cervical and Discectomy; C3-6 anterior plate       Family History   Problem Relation Name Age of Onset   • Alcohol abuse Father     • Other (heart problems) Father     • Alcohol abuse Maternal Grandfather     • Lung cancer Maternal Grandfather         Social History     Socioeconomic History   • Marital status:    Tobacco Use   • Smoking status: Never   • Smokeless tobacco: Never   Vaping Use   • Vaping status: Never Used   Substance and Sexual Activity   • Alcohol use: Yes     Comment: Rarely   • Drug use: Never     Social Drivers of Health     Financial Resource Strain: Medium Risk (2024)    Overall Financial Resource Strain (CARDIA)    • Difficulty of Paying Living Expenses: Somewhat hard   Food Insecurity: No Food Insecurity (2024)    Hunger Vital Sign    • Worried About Running Out of Food in the Last Year: Never true    • Ran Out of Food in the Last Year: Never true    Transportation Needs: No Transportation Needs (12/13/2024)    PRAPARE - Transportation    • Lack of Transportation (Medical): No    • Lack of Transportation (Non-Medical): No   Physical Activity: Not on File (4/8/2024)    [facility] from [facility]    Physical Activity    • Physical Activity: 0   Stress: Not at Risk (4/12/2024)    Received from [facility]    Stress    • Stress: 1   Social Connections: At Risk (4/12/2024)    Received from [facility]    Social Connections    • Connectedness: 2   Intimate Partner Violence: Not At Risk (12/13/2024)    Humiliation, Afraid, Rape, and Kick questionnaire    • Fear of Current or Ex-Partner: No    • Emotionally Abused: No    • Physically Abused: No    • Sexually Abused: No   Housing Stability: High Risk (12/13/2024)    Housing Stability Vital Sign    • Unable to Pay for Housing in the Last Year: Yes    • Number of Times Moved in the Last Year: 1    • Homeless in the Last Year: Yes       Vital signs:  138/78, 97.8, 77, 16, 98%    Objective  Physical Exam  HENT:      Head: Normocephalic and atraumatic.      Nose: Nose normal.      Mouth/Throat:      Mouth: Mucous membranes are moist.      Pharynx: Oropharynx is clear.   Eyes:      Extraocular Movements: Extraocular movements intact.      Pupils: Pupils are equal, round, and reactive to light.   Cardiovascular:      Rate and Rhythm: Normal rate and regular rhythm.   Pulmonary:      Effort: No respiratory distress.      Breath sounds: No wheezing, rhonchi or rales.   Abdominal:      General: Bowel sounds are normal. There is no distension.      Palpations: Abdomen is soft.      Tenderness: There is no abdominal tenderness. There is no guarding.   Musculoskeletal:      Right lower leg: No edema.      Left lower leg: No edema.   Skin:     General: Skin is warm and dry.   Neurological:      Mental Status: She is alert. Mental status is at baseline.         Assessment/Plan  Problem List Items Addressed This Visit       Anxiety and  depression - Primary     Continue current medications         GERD (gastroesophageal reflux disease)     Continue current medications         Hyperlipemia     Continue current medications         Hypertension     Continue current medications         Hypothyroidism     Continue current medications         Cervical spondylosis with myelopathy     Status post ACDF of C3-C6  Pain control  PT OT          Hospital records reviewed  Medications, treatments, and labs reviewed  Continue medications and treatments as listed in EMR  Discussed with nursing and therapy    Saad Gonzalez DO    Scribe Attestation  Anastasia REVELESibe   attest that this documentation has been prepared under the direction and in the presence of Saad Gonzalez DO    Provider Attestation - Scribe documentation  All medical record entries made by the Scribe were at my direction and personally dictated by me. I have reviewed the chart and agree that the record accurately reflects my personal performance of the history, physical exam, discussion and plan.      Electronically Signed By: Saad Gonzalez DO   12/27/24 12:18 PM

## 2024-12-19 NOTE — PROGRESS NOTES
PROGRESS NOTE    Subjective   Chief complaint: Johanne Acosta is a 69 y.o. female who is an acute skilled patient being seen and evaluated for weakness    HPI:  9/18/24 Patient has no new complaints or concerns today.  Continues working towards goals in therapy.  Denies constitutional symptoms.    9/20/24 Therapy has been working with the patient to improve strength and endurance with ADLs, transfers, and mobility.  Patient continues to work toward goals.  Patient is stable and has no new complaints.  Nursing staff voices no new concerns today.    9/23/2024 Patient in therapy d/t generalized weakness.  Patient presents for f/u.  Continues to work toward goals in therapy.  No new complaints at this time.          Objective   Vital signs: 101/58, 97.9, 90, 18, 96%    Physical Exam  Constitutional:       General: She is not in acute distress.  Eyes:      Extraocular Movements: Extraocular movements intact.   Cardiovascular:      Rate and Rhythm: Normal rate and regular rhythm.   Pulmonary:      Effort: Pulmonary effort is normal.      Breath sounds: Normal breath sounds.   Abdominal:      General: Bowel sounds are normal.      Palpations: Abdomen is soft.   Musculoskeletal:      Cervical back: Neck supple.      Right lower leg: No edema.      Left lower leg: No edema.   Neurological:      Mental Status: She is alert.      Motor: Weakness present.   Psychiatric:         Mood and Affect: Mood normal.         Assessment/Plan   Problem List Items Addressed This Visit       Dementia with mood disturbance, unspecified dementia severity, unspecified dementia type (Multi)     Mentation at baseline   Monitor mood         Status post lumbar and lumbosacral fusion by anterior technique     Pain mgmt  Pt/ot   tramadol            Weakness - Primary     Continue pt/ot           Medications, treatments, and labs reviewed  Continue medications and treatments as listed in EMR      Dilshad Freire Scribe attest  that this documentation has been prepared under the direction and in the presence of Saad Gonzalez, DO    Provider Attestation - Scribe documentation  All medical record entries made by the Scribe were at my direction and personally dictated by me. I have reviewed the chart and agree that the record accurately reflects my personal performance of the history, physical exam, discussion and plan.   Saad Gonzalez, DO

## 2024-12-23 NOTE — PROGRESS NOTES
HISTORY & PHYSICAL    Subjective   Chief complaint: Johanne Acosta is a 69 y.o. female who is a long term resident patient being seen and evaluated for multiple medical problems.  Patient presents for weakness    HPI:  Patient is a 69-year-old female who presents with weakness.  She underwent an ACDF of C3-C6.  She was discharged to skilled nursing facility for therapy.  She has no acute issues or complaints today.  No issues per nursing.        Past Medical History:   Diagnosis Date    Pain in right shoulder 04/20/2021    Right shoulder pain    Personal history of other diseases of the musculoskeletal system and connective tissue 04/20/2021    History of low back pain    Personal history of other diseases of the musculoskeletal system and connective tissue 01/21/2021    History of low back pain       Past Surgical History:   Procedure Laterality Date    ANTERIOR CERVICAL DISCECTOMY W/ FUSION  12/13/2024    C3-4, C4-5, and C5-6 Fusion Spine Anterior Cervical and Discectomy; C3-6 anterior plate       Family History   Problem Relation Name Age of Onset    Alcohol abuse Father      Other (heart problems) Father      Alcohol abuse Maternal Grandfather      Lung cancer Maternal Grandfather         Social History     Socioeconomic History    Marital status:    Tobacco Use    Smoking status: Never    Smokeless tobacco: Never   Vaping Use    Vaping status: Never Used   Substance and Sexual Activity    Alcohol use: Yes     Comment: Rarely    Drug use: Never     Social Drivers of Health     Financial Resource Strain: Medium Risk (12/13/2024)    Overall Financial Resource Strain (CARDIA)     Difficulty of Paying Living Expenses: Somewhat hard   Food Insecurity: No Food Insecurity (12/13/2024)    Hunger Vital Sign     Worried About Running Out of Food in the Last Year: Never true     Ran Out of Food in the Last Year: Never true   Transportation Needs: No Transportation Needs (12/13/2024)    PRAPARE - Transportation     Lack  of Transportation (Medical): No     Lack of Transportation (Non-Medical): No   Physical Activity: Not on File (4/8/2024)    [facility]    Physical Activity     Physical Activity: 0   Stress: Not at Risk (4/12/2024)    [facility] from [facility]    Stress     Stress: 1   Social Connections: At Risk (4/12/2024)    Received from [facility]    Social Connections     Connectedness: 2   Intimate Partner Violence: Not At Risk (12/13/2024)    Humiliation, Afraid, Rape, and Kick questionnaire     Fear of Current or Ex-Partner: No     Emotionally Abused: No     Physically Abused: No     Sexually Abused: No   Housing Stability: High Risk (12/13/2024)    Housing Stability Vital Sign     Unable to Pay for Housing in the Last Year: Yes     Number of Times Moved in the Last Year: 1     Homeless in the Last Year: Yes       Vital signs:  138/78, 97.8, 77, 16, 98%    Objective   Physical Exam  HENT:      Head: Normocephalic and atraumatic.      Nose: Nose normal.      Mouth/Throat:      Mouth: Mucous membranes are moist.      Pharynx: Oropharynx is clear.   Eyes:      Extraocular Movements: Extraocular movements intact.      Pupils: Pupils are equal, round, and reactive to light.   Cardiovascular:      Rate and Rhythm: Normal rate and regular rhythm.   Pulmonary:      Effort: No respiratory distress.      Breath sounds: No wheezing, rhonchi or rales.   Abdominal:      General: Bowel sounds are normal. There is no distension.      Palpations: Abdomen is soft.      Tenderness: There is no abdominal tenderness. There is no guarding.   Musculoskeletal:      Right lower leg: No edema.      Left lower leg: No edema.   Skin:     General: Skin is warm and dry.   Neurological:      Mental Status: She is alert. Mental status is at baseline.         Assessment/Plan   Problem List Items Addressed This Visit       Anxiety and depression - Primary     Continue current medications         GERD (gastroesophageal reflux disease)      Continue current medications         Hyperlipemia     Continue current medications         Hypertension     Continue current medications         Hypothyroidism     Continue current medications         Cervical spondylosis with myelopathy     Status post ACDF of C3-C6  Pain control  PT OT          Hospital records reviewed  Medications, treatments, and labs reviewed  Continue medications and treatments as listed in EMR  Discussed with nursing and therapy    Saad Gonzalez DO    Scribe Attestation  Anastasia REVELES   attest that this documentation has been prepared under the direction and in the presence of Saad Gonzalez DO    Provider Attestation - Scribe documentation  All medical record entries made by the Astonibe were at my direction and personally dictated by me. I have reviewed the chart and agree that the record accurately reflects my personal performance of the history, physical exam, discussion and plan.

## 2025-01-03 ENCOUNTER — APPOINTMENT (OUTPATIENT)
Dept: NEUROSURGERY | Facility: CLINIC | Age: 70
End: 2025-01-03
Payer: COMMERCIAL

## 2025-01-03 VITALS
BODY MASS INDEX: 30.99 KG/M2 | SYSTOLIC BLOOD PRESSURE: 132 MMHG | DIASTOLIC BLOOD PRESSURE: 78 MMHG | HEIGHT: 65 IN | TEMPERATURE: 96.9 F | WEIGHT: 186 LBS | HEART RATE: 79 BPM

## 2025-01-03 DIAGNOSIS — G89.18 ACUTE POSTOPERATIVE PAIN: ICD-10-CM

## 2025-01-03 DIAGNOSIS — M54.12 CERVICAL RADICULOPATHY: Primary | ICD-10-CM

## 2025-01-03 PROCEDURE — 99024 POSTOP FOLLOW-UP VISIT: CPT | Performed by: NURSE PRACTITIONER

## 2025-01-03 PROCEDURE — 3008F BODY MASS INDEX DOCD: CPT | Performed by: NURSE PRACTITIONER

## 2025-01-03 PROCEDURE — 3078F DIAST BP <80 MM HG: CPT | Performed by: NURSE PRACTITIONER

## 2025-01-03 PROCEDURE — 1111F DSCHRG MED/CURRENT MED MERGE: CPT | Performed by: NURSE PRACTITIONER

## 2025-01-03 PROCEDURE — 3075F SYST BP GE 130 - 139MM HG: CPT | Performed by: NURSE PRACTITIONER

## 2025-01-03 PROCEDURE — 1036F TOBACCO NON-USER: CPT | Performed by: NURSE PRACTITIONER

## 2025-01-03 PROCEDURE — 1125F AMNT PAIN NOTED PAIN PRSNT: CPT | Performed by: NURSE PRACTITIONER

## 2025-01-03 RX ORDER — CYCLOBENZAPRINE HCL 10 MG
10 TABLET ORAL 2 TIMES DAILY PRN
Qty: 60 TABLET | Refills: 0 | Status: SHIPPED | OUTPATIENT
Start: 2025-01-03 | End: 2025-02-02

## 2025-01-03 ASSESSMENT — PATIENT HEALTH QUESTIONNAIRE - PHQ9
2. FEELING DOWN, DEPRESSED OR HOPELESS: NOT AT ALL
1. LITTLE INTEREST OR PLEASURE IN DOING THINGS: NOT AT ALL
SUM OF ALL RESPONSES TO PHQ9 QUESTIONS 1 & 2: 0

## 2025-01-03 ASSESSMENT — PAIN SCALES - GENERAL: PAINLEVEL_OUTOF10: 7
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
## 2025-02-28 ENCOUNTER — TELEPHONE (OUTPATIENT)
Dept: PHARMACY | Facility: HOSPITAL | Age: 70
End: 2025-02-28
Payer: COMMERCIAL

## 2025-02-28 NOTE — TELEPHONE ENCOUNTER
Population Health: Outreach by Ambulatory Pharmacy Team    Patient: Johanne Acosta  Primary Care Provider (PCP): LANYD Harrell  Payor: [employer] [place]  Reason: Adherence  Medication(s): Atorvastatin  Outcome: Left Voicemail    Cristobal Gonzalez, PharmD    Resident Pharmacist

## 2025-03-06 ENCOUNTER — HOSPITAL ENCOUNTER (OUTPATIENT)
Dept: RADIOLOGY | Facility: HOSPITAL | Age: 70
Discharge: HOME | End: 2025-03-06
Payer: COMMERCIAL

## 2025-03-06 DIAGNOSIS — M54.12 CERVICAL RADICULOPATHY: ICD-10-CM

## 2025-03-06 PROCEDURE — 72040 X-RAY EXAM NECK SPINE 2-3 VW: CPT

## 2025-04-07 ENCOUNTER — PATIENT OUTREACH (OUTPATIENT)
Dept: CARE COORDINATION | Facility: CLINIC | Age: 70
End: 2025-04-07
Payer: COMMERCIAL

## 2025-04-07 NOTE — PROGRESS NOTES
Outreach call to patient to support a smooth transition of care from recent ED visit. Phone busy.  Gin Amos RN [facility]  [phone]

## 2025-06-05 PROBLEM — M25.562 CHRONIC PAIN OF LEFT KNEE: Status: ACTIVE | Noted: 2025-06-05

## 2025-06-05 PROBLEM — G89.29 CHRONIC PAIN OF LEFT KNEE: Status: ACTIVE | Noted: 2025-06-05

## 2025-06-09 ENCOUNTER — OFFICE VISIT (OUTPATIENT)
Dept: ORTHOPEDIC SURGERY | Facility: CLINIC | Age: 70
End: 2025-06-09
Payer: COMMERCIAL

## 2025-06-09 ENCOUNTER — HOSPITAL ENCOUNTER (OUTPATIENT)
Dept: RADIOLOGY | Facility: CLINIC | Age: 70
Discharge: HOME | End: 2025-06-09
Payer: COMMERCIAL

## 2025-06-09 VITALS — BODY MASS INDEX: 34.32 KG/M2 | WEIGHT: 206 LBS | HEIGHT: 65 IN

## 2025-06-09 DIAGNOSIS — G89.29 CHRONIC PAIN OF LEFT KNEE: Primary | ICD-10-CM

## 2025-06-09 DIAGNOSIS — G89.29 CHRONIC PAIN OF LEFT KNEE: ICD-10-CM

## 2025-06-09 DIAGNOSIS — M25.562 CHRONIC PAIN OF LEFT KNEE: Primary | ICD-10-CM

## 2025-06-09 DIAGNOSIS — M25.562 CHRONIC PAIN OF LEFT KNEE: ICD-10-CM

## 2025-06-09 PROCEDURE — 73562 X-RAY EXAM OF KNEE 3: CPT | Mod: LT

## 2025-06-09 PROCEDURE — 1159F MED LIST DOCD IN RCRD: CPT | Performed by: ORTHOPAEDIC SURGERY

## 2025-06-09 PROCEDURE — 1160F RVW MEDS BY RX/DR IN RCRD: CPT | Performed by: ORTHOPAEDIC SURGERY

## 2025-06-09 PROCEDURE — 1036F TOBACCO NON-USER: CPT | Performed by: ORTHOPAEDIC SURGERY

## 2025-06-09 PROCEDURE — 3008F BODY MASS INDEX DOCD: CPT | Performed by: ORTHOPAEDIC SURGERY

## 2025-06-09 PROCEDURE — 99212 OFFICE O/P EST SF 10 MIN: CPT | Performed by: ORTHOPAEDIC SURGERY

## 2025-06-09 PROCEDURE — 99203 OFFICE O/P NEW LOW 30 MIN: CPT | Performed by: ORTHOPAEDIC SURGERY

## 2025-06-09 ASSESSMENT — ENCOUNTER SYMPTOMS
LOSS OF SENSATION IN FEET: 0
DEPRESSION: 0
OCCASIONAL FEELINGS OF UNSTEADINESS: 1

## 2025-06-09 NOTE — PROGRESS NOTES
Subjective    Patient ID: Johanne Acosta is a 70 y.o. female.    Chief Complaint: Pain of the Left Knee (FOR A WHILE) and Pain of the Right Knee     Last Surgery: No surgery found  Last Surgery Date: No surgery found    HPI  The patient is a 70-year-old female who comes in with a complaint of left knee pain.  She did have a fall onto it approximately 2 weeks ago.  However she states she has fallen multiple times within the past year.  She states she can walk without pain.  However she has difficulty when trying to step into bed.  Of note the patient has had multiple spine surgeries with the most recent of which being in August 2024.  She states however that her follow-up following the spine surgery has been sporadic.  She complains of pain in her hips and both legs when attempting to get into bed.    Objective   Ortho Exam  The patient comes in today in a wheelchair because of her difficulty walking from her spine surgery.  Exam of her left knee reveals she does have a resolving contusion over her left patella.  She is able to fully extend her left knee and flex to greater than 120 degrees.  Knee is stable to varus and valgus stress testing.  She complains of pain and weakness bilaterally with resisted hip flexion.    Image Results:  X-rays of her left knee were personally reviewed today.  There is minimal arthritic changes.  There is no fracture or dislocation.    Assessment/Plan   Encounter Diagnoses:  Chronic pain of left knee    Orders Placed This Encounter    XR knee left 3 views     The patient has a left knee contusion which can be the source of her pain at that site.  However I explained to her that she really needs to follow-up with her spine surgeon since some of her pain and weakness closer to her hips and difficulty getting into bed may be more from her spine surgery as opposed to her left knee.

## 2025-07-08 ENCOUNTER — APPOINTMENT (OUTPATIENT)
Facility: CLINIC | Age: 70
End: 2025-07-08
Payer: COMMERCIAL

## 2025-07-22 ENCOUNTER — APPOINTMENT (OUTPATIENT)
Facility: CLINIC | Age: 70
End: 2025-07-22
Payer: COMMERCIAL

## 2025-08-01 ENCOUNTER — PATIENT OUTREACH (OUTPATIENT)
Dept: CARE COORDINATION | Facility: CLINIC | Age: 70
End: 2025-08-01
Payer: COMMERCIAL

## 2025-08-01 NOTE — PROGRESS NOTES
"Outreach to the patient following their recent visit to the ED to assess their needs and provide any necessary follow-up support. The attempt to reach the patient was unsuccessful, unable to make contact at this time, recording states \"vmb is full and cannot accept any messages.\"     Radha Ayoub RN, [facility] [place] [phone]    "

## 2025-08-15 ENCOUNTER — APPOINTMENT (OUTPATIENT)
Dept: NEUROSURGERY | Facility: CLINIC | Age: 70
End: 2025-08-15
Payer: COMMERCIAL

## (undated) DEVICE — DISTRACTION PIN, 14MM, STERILE

## (undated) DEVICE — DRAPE, LAPAROTOMY II, PEDIATRIC

## (undated) DEVICE — SEALANT, HEMOSTATIC, FLOSEAL, 10 ML

## (undated) DEVICE — SUTURE, MONOCRYL, 4-0, 27 IN, PS-2, UNDYED

## (undated) DEVICE — WOUND SYSTEM, DEBRIDEMENT & CLEANING, O.R DUOPAK

## (undated) DEVICE — TOWEL PACK, STERILE, 4/PACK, BLUE

## (undated) DEVICE — DRAPE, PAD, INSTRUMENT, MAGNETIC, MEDIUM, 10 X 16 IN, DISPOSABLE

## (undated) DEVICE — BUR, 3MM X 3.8MM, PRECISION, NEURO DRILL

## (undated) DEVICE — Device

## (undated) DEVICE — EVACUATOR, WOUND, SUCTION, CLOSED, JACKSON-PRATT, 100 CC, SILICONE

## (undated) DEVICE — RESERVOIR, WOUND, W/TROCAR, PVC, MEDIUM, 400CC, DAVOL, 1/8 IN, 10FR

## (undated) DEVICE — DRAPE, MICROSCOPE, W/REMOVABLE LENS

## (undated) DEVICE — SUTURE, VICRYL, 3-0,18 IN, SH, UNDYED

## (undated) DEVICE — SUTURE, SILK, 2-0, 18 IN, FSL, BLACK

## (undated) DEVICE — ADHESIVE, SKIN, DERMABOND ADVANCED, 15CM, PEN-STYLE

## (undated) DEVICE — ELECTRODE, ELECTROSURGICAL, BLADE EXT 4 INCH, INSULATED

## (undated) DEVICE — GLOVE, SURGICAL, PROTEXIS PI BLUE W/NEUTHERA, 8.5, PF, LF

## (undated) DEVICE — DRAIN, JACKSON-PRATT, ROUND/W TROCAR, SILICONE, 10FR

## (undated) DEVICE — SPONGE, HEMOSTAT, SURGICEL FIBRILLAR, ABS, 4 X 4, LF

## (undated) DEVICE — DRAPE COVER, C ARM, FLOUROSCAN IMAGING SYS